# Patient Record
Sex: FEMALE | Race: BLACK OR AFRICAN AMERICAN | Employment: FULL TIME
[De-identification: names, ages, dates, MRNs, and addresses within clinical notes are randomized per-mention and may not be internally consistent; named-entity substitution may affect disease eponyms.]

---

## 2017-01-20 ENCOUNTER — OFFICE VISIT (OUTPATIENT)
Dept: OBGYN | Facility: CLINIC | Age: 22
End: 2017-01-20

## 2017-01-20 VITALS
SYSTOLIC BLOOD PRESSURE: 124 MMHG | HEIGHT: 65 IN | TEMPERATURE: 99.4 F | HEART RATE: 80 BPM | BODY MASS INDEX: 24.29 KG/M2 | DIASTOLIC BLOOD PRESSURE: 83 MMHG | WEIGHT: 145.8 LBS | RESPIRATION RATE: 16 BRPM

## 2017-01-20 DIAGNOSIS — Z30.09 FAMILY PLANNING: ICD-10-CM

## 2017-01-20 DIAGNOSIS — N89.8 VAGINAL DISCHARGE: Primary | ICD-10-CM

## 2017-01-20 PROCEDURE — 99213 OFFICE O/P EST LOW 20 MIN: CPT | Performed by: OBSTETRICS & GYNECOLOGY

## 2017-03-26 ENCOUNTER — HOSPITAL ENCOUNTER (EMERGENCY)
Age: 22
Discharge: HOME | End: 2017-03-26
Payer: COMMERCIAL

## 2017-03-26 VITALS
OXYGEN SATURATION: 99 % | SYSTOLIC BLOOD PRESSURE: 121 MMHG | RESPIRATION RATE: 16 BRPM | WEIGHT: 147 LBS | TEMPERATURE: 98 F | HEART RATE: 89 BPM | HEIGHT: 63 IN | BODY MASS INDEX: 26.05 KG/M2 | DIASTOLIC BLOOD PRESSURE: 70 MMHG

## 2017-03-26 DIAGNOSIS — M54.50 ACUTE RIGHT-SIDED LOW BACK PAIN WITHOUT SCIATICA: Primary | ICD-10-CM

## 2017-03-26 LAB
ABSOLUTE EOS #: 0.1 K/UL (ref 0–0.4)
ABSOLUTE LYMPH #: 1.9 K/UL (ref 1–4.8)
ABSOLUTE MONO #: 0.4 K/UL (ref 0.2–0.8)
ALBUMIN SERPL-MCNC: 4.2 G/DL (ref 3.5–5.2)
ALBUMIN/GLOBULIN RATIO: ABNORMAL (ref 1–2.5)
ALP BLD-CCNC: 42 U/L (ref 35–104)
ALT SERPL-CCNC: 8 U/L (ref 5–33)
AMYLASE: 63 U/L (ref 28–100)
ANION GAP SERPL CALCULATED.3IONS-SCNC: 12 MMOL/L (ref 9–17)
AST SERPL-CCNC: 11 U/L
BASOPHILS # BLD: 1 % (ref 0–2)
BASOPHILS ABSOLUTE: 0 K/UL (ref 0–0.2)
BILIRUB SERPL-MCNC: 0.18 MG/DL (ref 0.3–1.2)
BILIRUBIN DIRECT: <0.08 MG/DL
BILIRUBIN URINE: NEGATIVE
BILIRUBIN, INDIRECT: ABNORMAL MG/DL (ref 0–1)
BUN BLDV-MCNC: 6 MG/DL (ref 6–20)
BUN/CREAT BLD: 11 (ref 9–20)
CALCIUM SERPL-MCNC: 9 MG/DL (ref 8.6–10.4)
CHLORIDE BLD-SCNC: 104 MMOL/L (ref 98–107)
CO2: 23 MMOL/L (ref 20–31)
COLOR: YELLOW
COMMENT UA: NORMAL
CREAT SERPL-MCNC: 0.55 MG/DL (ref 0.5–0.9)
DIFFERENTIAL TYPE: ABNORMAL
EOSINOPHILS RELATIVE PERCENT: 2 % (ref 1–4)
GFR AFRICAN AMERICAN: >60 ML/MIN
GFR NON-AFRICAN AMERICAN: >60 ML/MIN
GFR SERPL CREATININE-BSD FRML MDRD: NORMAL ML/MIN/{1.73_M2}
GFR SERPL CREATININE-BSD FRML MDRD: NORMAL ML/MIN/{1.73_M2}
GLOBULIN: ABNORMAL G/DL (ref 1.5–3.8)
GLUCOSE BLD-MCNC: 92 MG/DL (ref 70–99)
GLUCOSE URINE: NEGATIVE
HCT VFR BLD CALC: 36.2 % (ref 36–46)
HEMOGLOBIN: 12.2 G/DL (ref 12–16)
KETONES, URINE: NEGATIVE
LEUKOCYTE ESTERASE, URINE: NEGATIVE
LIPASE: 23 U/L (ref 13–60)
LYMPHOCYTES # BLD: 40 % (ref 24–44)
MCH RBC QN AUTO: 32.1 PG (ref 26–34)
MCHC RBC AUTO-ENTMCNC: 33.8 G/DL (ref 31–37)
MCV RBC AUTO: 94.9 FL (ref 80–100)
MONOCYTES # BLD: 8 % (ref 1–7)
NITRITE, URINE: NEGATIVE
PDW BLD-RTO: 13.3 % (ref 11.5–14.5)
PH UA: 7.5 (ref 5–8)
PLATELET # BLD: 219 K/UL (ref 130–400)
PLATELET ESTIMATE: ABNORMAL
PMV BLD AUTO: 8.8 FL (ref 6–12)
POTASSIUM SERPL-SCNC: 4 MMOL/L (ref 3.7–5.3)
PROTEIN UA: NEGATIVE
RBC # BLD: 3.81 M/UL (ref 4–5.2)
RBC # BLD: ABNORMAL 10*6/UL
SEG NEUTROPHILS: 49 % (ref 36–66)
SEGMENTED NEUTROPHILS ABSOLUTE COUNT: 2.3 K/UL (ref 1.8–7.7)
SODIUM BLD-SCNC: 139 MMOL/L (ref 135–144)
SPECIFIC GRAVITY UA: 1.02 (ref 1–1.03)
TOTAL PROTEIN: 7.1 G/DL (ref 6.4–8.3)
TURBIDITY: CLEAR
URINE HGB: NEGATIVE
UROBILINOGEN, URINE: NORMAL
WBC # BLD: 4.7 K/UL (ref 3.5–11)
WBC # BLD: ABNORMAL 10*3/UL

## 2017-03-26 PROCEDURE — 85025 COMPLETE CBC W/AUTO DIFF WBC: CPT

## 2017-03-26 PROCEDURE — 84703 CHORIONIC GONADOTROPIN ASSAY: CPT

## 2017-03-26 PROCEDURE — 99283 EMERGENCY DEPT VISIT LOW MDM: CPT

## 2017-03-26 PROCEDURE — 83690 ASSAY OF LIPASE: CPT

## 2017-03-26 PROCEDURE — 80048 BASIC METABOLIC PNL TOTAL CA: CPT

## 2017-03-26 PROCEDURE — 80076 HEPATIC FUNCTION PANEL: CPT

## 2017-03-26 PROCEDURE — 82150 ASSAY OF AMYLASE: CPT

## 2017-03-26 RX ORDER — IBUPROFEN 600 MG/1
600 TABLET ORAL EVERY 6 HOURS PRN
Qty: 30 TABLET | Refills: 0 | Status: SHIPPED | OUTPATIENT
Start: 2017-03-26 | End: 2017-08-30

## 2017-03-26 RX ORDER — METHOCARBAMOL 750 MG/1
750 TABLET, FILM COATED ORAL 3 TIMES DAILY
Qty: 30 TABLET | Refills: 0 | Status: SHIPPED | OUTPATIENT
Start: 2017-03-26 | End: 2017-04-05

## 2017-03-26 ASSESSMENT — PAIN DESCRIPTION - ORIENTATION: ORIENTATION: LOWER

## 2017-03-26 ASSESSMENT — PAIN DESCRIPTION - LOCATION: LOCATION: BACK

## 2017-03-26 ASSESSMENT — ENCOUNTER SYMPTOMS
RESPIRATORY NEGATIVE: 1
BACK PAIN: 1

## 2017-03-26 ASSESSMENT — PAIN SCALES - GENERAL: PAINLEVEL_OUTOF10: 3

## 2017-03-26 ASSESSMENT — PAIN DESCRIPTION - DESCRIPTORS: DESCRIPTORS: ACHING

## 2017-03-27 LAB — HCG, PREGNANCY URINE (POC): NEGATIVE

## 2017-05-22 ENCOUNTER — OFFICE VISIT (OUTPATIENT)
Dept: OBGYN | Age: 22
End: 2017-05-22
Payer: COMMERCIAL

## 2017-05-22 ENCOUNTER — HOSPITAL ENCOUNTER (OUTPATIENT)
Age: 22
Setting detail: SPECIMEN
Discharge: HOME | End: 2017-05-22
Payer: COMMERCIAL

## 2017-05-22 VITALS
DIASTOLIC BLOOD PRESSURE: 87 MMHG | SYSTOLIC BLOOD PRESSURE: 135 MMHG | WEIGHT: 149 LBS | HEART RATE: 95 BPM | HEIGHT: 64 IN | BODY MASS INDEX: 25.44 KG/M2

## 2017-05-22 DIAGNOSIS — Z01.419 WOMEN'S ANNUAL ROUTINE GYNECOLOGICAL EXAMINATION: Primary | ICD-10-CM

## 2017-05-22 DIAGNOSIS — Z86.19 H/O CHLAMYDIA INFECTION: ICD-10-CM

## 2017-05-22 LAB
BILIRUBIN URINE: NEGATIVE
COLOR: YELLOW
COMMENT UA: NORMAL
DIRECT EXAM: NORMAL
GLUCOSE URINE: NEGATIVE
KETONES, URINE: NEGATIVE
LEUKOCYTE ESTERASE, URINE: NEGATIVE
Lab: NORMAL
NITRITE, URINE: NEGATIVE
PH UA: 7.5 (ref 5–8)
PROTEIN UA: NEGATIVE
SPECIFIC GRAVITY UA: 1.01 (ref 1–1.03)
SPECIMEN DESCRIPTION: NORMAL
STATUS: NORMAL
TURBIDITY: CLEAR
URINE HGB: NEGATIVE
UROBILINOGEN, URINE: NORMAL

## 2017-05-22 PROCEDURE — 99395 PREV VISIT EST AGE 18-39: CPT | Performed by: OBSTETRICS & GYNECOLOGY

## 2017-05-22 PROCEDURE — 81003 URINALYSIS AUTO W/O SCOPE: CPT | Performed by: OBSTETRICS & GYNECOLOGY

## 2017-05-22 RX ORDER — NORGESTIMATE AND ETHINYL ESTRADIOL 0.25-0.035
1 KIT ORAL DAILY
Qty: 28 TABLET | Refills: 5 | Status: ON HOLD | OUTPATIENT
Start: 2017-05-22 | End: 2018-04-17 | Stop reason: SDUPTHER

## 2017-05-23 LAB
C TRACH DNA GENITAL QL NAA+PROBE: NEGATIVE
N. GONORRHOEAE DNA: NEGATIVE

## 2017-06-08 RX ORDER — NORGESTIMATE AND ETHINYL ESTRADIOL 0.25-0.035
KIT ORAL
Qty: 28 TABLET | Refills: 3 | Status: SHIPPED | OUTPATIENT
Start: 2017-06-08 | End: 2017-08-30

## 2017-08-27 ENCOUNTER — HOSPITAL ENCOUNTER (EMERGENCY)
Age: 22
Discharge: HOME | End: 2017-08-27
Attending: EMERGENCY MEDICINE
Payer: COMMERCIAL

## 2017-08-27 VITALS
HEIGHT: 64 IN | HEART RATE: 90 BPM | RESPIRATION RATE: 16 BRPM | TEMPERATURE: 98.1 F | SYSTOLIC BLOOD PRESSURE: 134 MMHG | BODY MASS INDEX: 25.9 KG/M2 | WEIGHT: 151.7 LBS | OXYGEN SATURATION: 100 % | DIASTOLIC BLOOD PRESSURE: 74 MMHG

## 2017-08-27 DIAGNOSIS — L02.91 ABSCESS: Primary | ICD-10-CM

## 2017-08-27 PROCEDURE — 10061 I&D ABSCESS COMP/MULTIPLE: CPT

## 2017-08-27 PROCEDURE — 99282 EMERGENCY DEPT VISIT SF MDM: CPT

## 2017-08-27 RX ORDER — CEPHALEXIN 500 MG/1
500 CAPSULE ORAL 3 TIMES DAILY
Qty: 15 CAPSULE | Refills: 0 | Status: SHIPPED | OUTPATIENT
Start: 2017-08-27 | End: 2017-09-01

## 2017-08-27 RX ORDER — FLUCONAZOLE 100 MG/1
100 TABLET ORAL DAILY
Qty: 5 TABLET | Refills: 0 | Status: SHIPPED | OUTPATIENT
Start: 2017-08-27 | End: 2017-09-01

## 2017-08-27 ASSESSMENT — PAIN DESCRIPTION - ORIENTATION: ORIENTATION: LEFT

## 2017-08-27 ASSESSMENT — PAIN SCALES - GENERAL: PAINLEVEL_OUTOF10: 8

## 2017-08-27 ASSESSMENT — PAIN DESCRIPTION - LOCATION: LOCATION: GROIN

## 2017-08-27 ASSESSMENT — PAIN SCALES - WONG BAKER: WONGBAKER_NUMERICALRESPONSE: 8

## 2017-08-30 ENCOUNTER — OFFICE VISIT (OUTPATIENT)
Dept: FAMILY MEDICINE CLINIC | Age: 22
End: 2017-08-30
Payer: COMMERCIAL

## 2017-08-30 VITALS
HEART RATE: 80 BPM | BODY MASS INDEX: 25.78 KG/M2 | SYSTOLIC BLOOD PRESSURE: 123 MMHG | WEIGHT: 151 LBS | HEIGHT: 64 IN | TEMPERATURE: 97.6 F | DIASTOLIC BLOOD PRESSURE: 87 MMHG

## 2017-08-30 DIAGNOSIS — L02.234 CARBUNCLE OF GROIN: Primary | ICD-10-CM

## 2017-08-30 PROCEDURE — 99213 OFFICE O/P EST LOW 20 MIN: CPT | Performed by: FAMILY MEDICINE

## 2017-08-30 ASSESSMENT — ENCOUNTER SYMPTOMS
CONSTIPATION: 0
SORE THROAT: 0
ABDOMINAL PAIN: 0
COUGH: 0
TROUBLE SWALLOWING: 0
NAUSEA: 0
SHORTNESS OF BREATH: 0

## 2018-01-29 ENCOUNTER — HOSPITAL ENCOUNTER (EMERGENCY)
Age: 23
Discharge: HOME | End: 2018-01-29
Attending: EMERGENCY MEDICINE
Payer: COMMERCIAL

## 2018-01-29 VITALS
WEIGHT: 151.3 LBS | DIASTOLIC BLOOD PRESSURE: 76 MMHG | TEMPERATURE: 98.7 F | OXYGEN SATURATION: 100 % | BODY MASS INDEX: 25.83 KG/M2 | HEART RATE: 83 BPM | RESPIRATION RATE: 16 BRPM | SYSTOLIC BLOOD PRESSURE: 138 MMHG | HEIGHT: 64 IN

## 2018-01-29 DIAGNOSIS — R10.9 LEFT FLANK PAIN: Primary | ICD-10-CM

## 2018-01-29 DIAGNOSIS — K59.00 CONSTIPATION, UNSPECIFIED CONSTIPATION TYPE: ICD-10-CM

## 2018-01-29 LAB
-: ABNORMAL
AMORPHOUS: ABNORMAL
APPEARANCE: NORMAL
BACTERIA: ABNORMAL
BILIRUBIN URINE: NEGATIVE
BILIRUBIN, POC: NORMAL
BLOOD URINE, POC: NORMAL
CASTS UA: ABNORMAL /LPF
CHP ED QC CHECK: NORMAL
CHP ED QC CHECK: NORMAL
CLARITY, POC: CLEAR
COLOR, POC: YELLOW
COLOR: YELLOW
COMMENT UA: ABNORMAL
CRYSTALS, UA: ABNORMAL /HPF
EPITHELIAL CELLS UA: ABNORMAL /HPF (ref 0–5)
GLUCOSE URINE, POC: NORMAL
GLUCOSE URINE: NEGATIVE
KETONES, POC: NORMAL
KETONES, URINE: ABNORMAL
LEUKOCYTE EST, POC: NORMAL
LEUKOCYTE ESTERASE, URINE: ABNORMAL
MUCUS: ABNORMAL
NITRITE, POC: NORMAL
NITRITE, URINE: NEGATIVE
OTHER OBSERVATIONS UA: ABNORMAL
PH UA: 5.5 (ref 5–8)
PH, POC: 6
PREGNANCY TEST URINE, POC: NEGATIVE
PROTEIN UA: NEGATIVE
PROTEIN, POC: NORMAL
RBC UA: ABNORMAL /HPF (ref 0–2)
RENAL EPITHELIAL, UA: ABNORMAL /HPF
SPECIFIC GRAVITY UA: 1.03 (ref 1–1.03)
SPECIFIC GRAVITY, POC: 1.03
TRICHOMONAS: ABNORMAL
TURBIDITY: CLEAR
URINE HGB: NEGATIVE
UROBILINOGEN, POC: 1
UROBILINOGEN, URINE: NORMAL
WBC UA: ABNORMAL /HPF (ref 0–5)
YEAST: ABNORMAL

## 2018-01-29 PROCEDURE — 87086 URINE CULTURE/COLONY COUNT: CPT

## 2018-01-29 PROCEDURE — 86403 PARTICLE AGGLUT ANTBDY SCRN: CPT

## 2018-01-29 PROCEDURE — 81001 URINALYSIS AUTO W/SCOPE: CPT

## 2018-01-29 PROCEDURE — 99283 EMERGENCY DEPT VISIT LOW MDM: CPT

## 2018-01-29 PROCEDURE — 84703 CHORIONIC GONADOTROPIN ASSAY: CPT

## 2018-01-29 RX ORDER — POLYETHYLENE GLYCOL 3350 17 G/17G
17 POWDER, FOR SOLUTION ORAL DAILY PRN
Qty: 527 G | Refills: 1 | Status: SHIPPED | OUTPATIENT
Start: 2018-01-29 | End: 2018-02-28

## 2018-01-30 LAB
CULTURE: ABNORMAL
CULTURE: ABNORMAL
HCG, PREGNANCY URINE (POC): NEGATIVE
Lab: ABNORMAL
SPECIMEN DESCRIPTION: ABNORMAL
SPECIMEN DESCRIPTION: ABNORMAL
STATUS: ABNORMAL

## 2018-01-30 NOTE — ED NOTES
Urine dipped, results placed on chart and reported to Dr Hicks .       Christine Quintanilla, RN  01/29/18 2820

## 2018-01-30 NOTE — ED PROVIDER NOTES
[facility] ED  eMERGENCY dEPARTMENT eNCOUnter      Pt Name: Eva Mackay  MRN: [id number]  [place] 1995  Date of evaluation: 1/29/2018  Provider: Thi Hernandez MD    CHIEF COMPLAINT       Chief Complaint   Patient presents with    Dysuria         HISTORY OF PRESENT ILLNESS   (Location/Symptom, Timing/Onset, Context/Setting, Quality, Duration, Modifying Factors, Severity)  Note limiting factors. Eva Mackay is a 20 y.o. female who presents to the emergency department With a 3 month history of intermittent cramp-like left flank pain accompanied occasionally with nausea and sometimes vomiting. She has been constipated off and on and denies urinary symptoms. The mother thought she may have a urinary tract infection and directed her to the ED. Patient does not report vaginal discharge. The history is provided by the patient. Nursing Notes were reviewed. REVIEW OF SYSTEMS    (2-9 systems for level 4, 10 or more for level 5)     Review of Systems   All other systems reviewed and are negative. Except as noted above the remainder of the review of systems was reviewed and negative. PAST MEDICAL HISTORY     Past Medical History:   Diagnosis Date    Vision abnormalities          SURGICAL HISTORY     History reviewed. No pertinent surgical history. CURRENT MEDICATIONS       Discharge Medication List as of 1/29/2018 10:51 PM      CONTINUE these medications which have NOT CHANGED    Details   !! [address] 0.25-35 MG-MCG per tablet take 1 tablet by mouth once daily, Disp-28 tablet, R-5Normal      !! norgestimate-ethinyl estradiol (MONO-LINYAH) 0.25-35 MG-MCG per tablet Take 1 tablet by mouth daily, Disp-28 tablet, R-5Normal       !! - Potential duplicate medications found. Please discuss with provider. ALLERGIES     Review of patient's allergies indicates no known allergies.     FAMILY HISTORY       Family History   Problem Relation Age of Onset    Breast Cancer Neg Hx Non-plain film images such as CT, Ultrasound and MRI are read by the radiologist. Plain radiographic images are visualized and preliminarily interpreted by the emergency physician with the below findings:    Interpretation per the Radiologist below, if available at the time of this note:    No orders to display         ED BEDSIDE ULTRASOUND:   Performed by ED Physician - none    LABS:  Labs Reviewed   UA W/REFLEX CULTURE - Abnormal; Notable for the following:        Result Value    Ketones, Urine 1+ (*)     Leukocyte Esterase, Urine TRACE (*)     All other components within normal limits   MICROSCOPIC URINALYSIS - Abnormal; Notable for the following:     Bacteria, UA FEW (*)     Mucus, UA 1+ (*)     All other components within normal limits   POCT URINALYSIS DIPSTICK - Normal   POCT URINE PREGNANCY - Normal   URINE CULTURE CLEAN CATCH       All other labs were within normal range or not returned as of this dictation. EMERGENCY DEPARTMENT COURSE and DIFFERENTIAL DIAGNOSIS/MDM:   Vitals:    Vitals:    01/29/18 2140   BP: 138/76   Pulse: 83   Resp: 16   Temp: 98.7 °F (37.1 °C)   TempSrc: Oral   SpO2: 100%   Weight: 151 lb 4.8 oz (68.6 kg)   Height: 5' 4\" (1.626 m)       Urinalysis does not show signs of infection. Urine pregnancy is negative. Patient is placed on MiraLAX and is advised follow-up with her primary care physician further workup as needed. MDM    CONSULTS:  None    PROCEDURES:  Unless otherwise noted below, none     Procedures    FINAL IMPRESSION      1. Left flank pain    2.  Constipation, unspecified constipation type          DISPOSITION/PLAN   DISPOSITION Decision To Discharge 01/29/2018 10:50:00 PM      PATIENT REFERRED TO:  Pedro Novak MD  [facility] [address]  [phone]    Schedule an appointment as soon as possible for a visit         DISCHARGE MEDICATIONS:  Discharge Medication List as of 1/29/2018 10:51 PM      START taking these medications    Details   polyethylene

## 2018-02-05 ENCOUNTER — HOSPITAL ENCOUNTER (EMERGENCY)
Age: 23
Discharge: HOME | End: 2018-02-05
Attending: EMERGENCY MEDICINE
Payer: MEDICARE

## 2018-02-05 VITALS
OXYGEN SATURATION: 100 % | HEIGHT: 64 IN | SYSTOLIC BLOOD PRESSURE: 133 MMHG | RESPIRATION RATE: 16 BRPM | HEART RATE: 95 BPM | TEMPERATURE: 98.7 F | BODY MASS INDEX: 25.4 KG/M2 | DIASTOLIC BLOOD PRESSURE: 84 MMHG | WEIGHT: 148.8 LBS

## 2018-02-05 DIAGNOSIS — N76.0 BACTERIAL VAGINOSIS: Primary | ICD-10-CM

## 2018-02-05 DIAGNOSIS — B96.89 BACTERIAL VAGINOSIS: Primary | ICD-10-CM

## 2018-02-05 LAB
-: ABNORMAL
AMORPHOUS: ABNORMAL
BACTERIA: ABNORMAL
BILIRUBIN URINE: NEGATIVE
CASTS UA: ABNORMAL /LPF
CHP ED QC CHECK: YES
COLOR: YELLOW
COMMENT UA: ABNORMAL
CRYSTALS, UA: ABNORMAL /HPF
DIRECT EXAM: ABNORMAL
EPITHELIAL CELLS UA: ABNORMAL /HPF (ref 0–5)
GLUCOSE URINE: NEGATIVE
KETONES, URINE: ABNORMAL
LEUKOCYTE ESTERASE, URINE: NEGATIVE
Lab: ABNORMAL
MUCUS: ABNORMAL
NITRITE, URINE: NEGATIVE
OTHER OBSERVATIONS UA: ABNORMAL
PH UA: 5.5 (ref 5–8)
PREGNANCY TEST URINE, POC: NEGATIVE
PROTEIN UA: NEGATIVE
RBC UA: ABNORMAL /HPF (ref 0–2)
RENAL EPITHELIAL, UA: ABNORMAL /HPF
SPECIFIC GRAVITY UA: 1.02 (ref 1–1.03)
SPECIMEN DESCRIPTION: ABNORMAL
STATUS: ABNORMAL
TRICHOMONAS: ABNORMAL
TURBIDITY: ABNORMAL
URINE HGB: NEGATIVE
UROBILINOGEN, URINE: NORMAL
WBC UA: ABNORMAL /HPF (ref 0–5)
YEAST: ABNORMAL

## 2018-02-05 PROCEDURE — 81001 URINALYSIS AUTO W/SCOPE: CPT

## 2018-02-05 PROCEDURE — 87480 CANDIDA DNA DIR PROBE: CPT

## 2018-02-05 PROCEDURE — 87510 GARDNER VAG DNA DIR PROBE: CPT

## 2018-02-05 PROCEDURE — 87591 N.GONORRHOEAE DNA AMP PROB: CPT

## 2018-02-05 PROCEDURE — 99283 EMERGENCY DEPT VISIT LOW MDM: CPT

## 2018-02-05 PROCEDURE — 84703 CHORIONIC GONADOTROPIN ASSAY: CPT

## 2018-02-05 PROCEDURE — 87660 TRICHOMONAS VAGIN DIR PROBE: CPT

## 2018-02-05 PROCEDURE — 87491 CHLMYD TRACH DNA AMP PROBE: CPT

## 2018-02-05 RX ORDER — METRONIDAZOLE 500 MG/1
500 TABLET ORAL 2 TIMES DAILY
Qty: 14 TABLET | Refills: 0 | Status: SHIPPED | OUTPATIENT
Start: 2018-02-05 | End: 2018-02-12

## 2018-02-05 RX ORDER — FLUCONAZOLE 150 MG/1
150 TABLET ORAL ONCE
Qty: 1 TABLET | Refills: 1 | Status: SHIPPED | OUTPATIENT
Start: 2018-02-05 | End: 2018-02-05

## 2018-02-05 ASSESSMENT — PAIN SCALES - GENERAL: PAINLEVEL_OUTOF10: 2

## 2018-02-05 ASSESSMENT — ENCOUNTER SYMPTOMS
DIARRHEA: 0
NAUSEA: 0
COLOR CHANGE: 0
BACK PAIN: 0
ABDOMINAL PAIN: 0
VOMITING: 0

## 2018-02-05 ASSESSMENT — PAIN DESCRIPTION - LOCATION: LOCATION: VAGINA

## 2018-02-05 ASSESSMENT — PAIN SCALES - WONG BAKER: WONGBAKER_NUMERICALRESPONSE: 2

## 2018-02-05 ASSESSMENT — PAIN DESCRIPTION - DESCRIPTORS: DESCRIPTORS: BURNING;ITCHING

## 2018-02-05 ASSESSMENT — PAIN DESCRIPTION - FREQUENCY: FREQUENCY: INTERMITTENT

## 2018-02-06 LAB
C TRACH DNA GENITAL QL NAA+PROBE: NEGATIVE
HCG, PREGNANCY URINE (POC): NEGATIVE
N. GONORRHOEAE DNA: NEGATIVE

## 2018-02-06 NOTE — ED PROVIDER NOTES
[facility] [address] ED  eMERGENCY dEPARTMENT eNCOUnter      Pt Name: Daniel Vital  MRN: [id number]  [place] 1995  Date of evaluation: 2018  Provider: Taj Mondragon NP    CHIEF COMPLAINT       Chief Complaint   Patient presents with    Vaginal Itching     onset 1 week, new soap and laundry soap    Exposure to STD    Dysuria         HISTORY OF PRESENT ILLNESS  (Location/Symptom, Timing/Onset, Context/Setting, Quality, Duration, Modifying Factors, Severity.)   Daniel Vital is a 20 y.o. female who presents to the emergency department via private auto for vaginal discharge, itching, odor. Onset was one week ago. Denies fever, chills, vaginal lesions, abdominal pain. Reports intermittent, mild dysuria. Rates her pain 2/10 at this time. States she recently changed soap and laundry detergent. Nursing Notes were reviewed. ALLERGIES     Review of patient's allergies indicates no known allergies. CURRENT MEDICATIONS       Previous Medications    MONO-LINYAH 0.25-35 MG-MCG PER TABLET    take 1 tablet by mouth once daily    NORGESTIMATE-ETHINYL ESTRADIOL (MONO-LINYAH) 0.25-35 MG-MCG PER TABLET    Take 1 tablet by mouth daily    POLYETHYLENE GLYCOL (MIRALAX) PACKET    Take 17 g by mouth daily as needed for Constipation       PAST MEDICAL HISTORY         Diagnosis Date    Vision abnormalities        SURGICAL HISTORY     History reviewed. No pertinent surgical history. FAMILY HISTORY           Problem Relation Age of Onset    Breast Cancer Neg Hx     Cancer Neg Hx     Colon Cancer Neg Hx     Diabetes Neg Hx     Eclampsia Neg Hx     Hypertension Neg Hx     Ovarian Cancer Neg Hx      Labor Neg Hx     Spont Abortions Neg Hx     Stroke Neg Hx      Family Status   Relation Status    Mother Alive    Neg Hx         SOCIAL HISTORY      reports that she has never smoked. She has never used smokeless tobacco. She reports that she does not drink alcohol or use drugs.     REVIEW OF SYSTEMS    (2-9 systems for level 4, 10 or more for level 5)     Review of Systems   Constitutional: Negative for chills, diaphoresis, fatigue and fever. Gastrointestinal: Negative for abdominal pain, diarrhea, nausea and vomiting. Genitourinary: Positive for dysuria and vaginal discharge. Negative for flank pain, frequency, genital sores, hematuria, urgency and vaginal bleeding. Musculoskeletal: Negative for arthralgias, back pain, myalgias and neck pain. Skin: Negative for color change, rash and wound. Neurological: Negative for dizziness and headaches. Except as noted above the remainder of the review of systems was reviewed and negative. PHYSICAL EXAM    (up to 7 for level 4, 8 or more for level 5)     ED Triage Vitals [02/05/18 2105]   BP Temp Temp Source Pulse Resp SpO2 Height Weight   133/84 98.7 °F (37.1 °C) Oral 95 16 100 % 5' 4\" (1.626 m) 148 lb 12.8 oz (67.5 kg)     Physical Exam   Constitutional: She is oriented to person, place, and time. She appears well-developed and well-nourished. No distress. Eyes: Conjunctivae are normal.   Cardiovascular: Normal rate, regular rhythm and normal heart sounds. Pulmonary/Chest: Effort normal and breath sounds normal. No respiratory distress. She has no wheezes. She has no rales. Abdominal: Soft. Bowel sounds are normal. She exhibits no distension. There is no tenderness. Genitourinary: There is no rash, tenderness or lesion on the right labia. There is no rash, tenderness or lesion on the left labia. Cervix exhibits no motion tenderness and no discharge. Right adnexum displays no tenderness. Left adnexum displays no tenderness. No erythema, tenderness or bleeding in the vagina. No foreign body in the vagina. No vaginal discharge found. Neurological: She is alert and oriented to person, place, and time. Skin: Skin is warm and dry. No rash noted. She is not diaphoretic. Psychiatric: She has a normal mood and affect.  Her behavior is

## 2018-03-26 ENCOUNTER — APPOINTMENT (OUTPATIENT)
Dept: GENERAL RADIOLOGY | Age: 23
End: 2018-03-26
Payer: MEDICARE

## 2018-03-26 ENCOUNTER — HOSPITAL ENCOUNTER (EMERGENCY)
Age: 23
Discharge: HOME | End: 2018-03-26
Attending: EMERGENCY MEDICINE
Payer: MEDICARE

## 2018-03-26 VITALS
RESPIRATION RATE: 16 BRPM | TEMPERATURE: 98.8 F | OXYGEN SATURATION: 100 % | HEART RATE: 83 BPM | DIASTOLIC BLOOD PRESSURE: 89 MMHG | HEIGHT: 64 IN | WEIGHT: 153.19 LBS | BODY MASS INDEX: 26.15 KG/M2 | SYSTOLIC BLOOD PRESSURE: 150 MMHG

## 2018-03-26 DIAGNOSIS — R07.89 ATYPICAL CHEST PAIN: Primary | ICD-10-CM

## 2018-03-26 LAB
ABSOLUTE EOS #: 0.1 K/UL (ref 0–0.4)
ABSOLUTE IMMATURE GRANULOCYTE: ABNORMAL K/UL (ref 0–0.3)
ABSOLUTE LYMPH #: 2.2 K/UL (ref 1–4.8)
ABSOLUTE MONO #: 0.4 K/UL (ref 0.2–0.8)
ANION GAP SERPL CALCULATED.3IONS-SCNC: 12 MMOL/L (ref 9–17)
BASOPHILS # BLD: 0 % (ref 0–2)
BASOPHILS ABSOLUTE: 0 K/UL (ref 0–0.2)
BUN BLDV-MCNC: 14 MG/DL (ref 6–20)
BUN/CREAT BLD: 24 (ref 9–20)
CALCIUM SERPL-MCNC: 8.8 MG/DL (ref 8.6–10.4)
CHLORIDE BLD-SCNC: 101 MMOL/L (ref 98–107)
CHP ED QC CHECK: NORMAL
CO2: 25 MMOL/L (ref 20–31)
CREAT SERPL-MCNC: 0.58 MG/DL (ref 0.5–0.9)
D-DIMER QUANTITATIVE: 0.19 MG/L FEU
DIFFERENTIAL TYPE: ABNORMAL
EKG ATRIAL RATE: 79 BPM
EKG P AXIS: 54 DEGREES
EKG P-R INTERVAL: 124 MS
EKG Q-T INTERVAL: 378 MS
EKG QRS DURATION: 80 MS
EKG QTC CALCULATION (BAZETT): 433 MS
EKG R AXIS: 58 DEGREES
EKG T AXIS: 27 DEGREES
EKG VENTRICULAR RATE: 79 BPM
EOSINOPHILS RELATIVE PERCENT: 1 % (ref 1–4)
GFR AFRICAN AMERICAN: >60 ML/MIN
GFR NON-AFRICAN AMERICAN: >60 ML/MIN
GFR SERPL CREATININE-BSD FRML MDRD: ABNORMAL ML/MIN/{1.73_M2}
GFR SERPL CREATININE-BSD FRML MDRD: ABNORMAL ML/MIN/{1.73_M2}
GLUCOSE BLD-MCNC: 98 MG/DL (ref 70–99)
HCT VFR BLD CALC: 35.5 % (ref 36–46)
HEMOGLOBIN: 11.5 G/DL (ref 12–16)
IMMATURE GRANULOCYTES: ABNORMAL %
LYMPHOCYTES # BLD: 42 % (ref 24–44)
MCH RBC QN AUTO: 31.2 PG (ref 26–34)
MCHC RBC AUTO-ENTMCNC: 32.5 G/DL (ref 31–37)
MCV RBC AUTO: 96.2 FL (ref 80–100)
MONOCYTES # BLD: 8 % (ref 1–7)
NRBC AUTOMATED: ABNORMAL PER 100 WBC
PDW BLD-RTO: 15.1 % (ref 11.5–14.5)
PLATELET # BLD: 259 K/UL (ref 130–400)
PLATELET ESTIMATE: ABNORMAL
PMV BLD AUTO: 9.4 FL (ref 6–12)
POTASSIUM SERPL-SCNC: 3.6 MMOL/L (ref 3.7–5.3)
PREGNANCY TEST URINE, POC: NORMAL
RBC # BLD: 3.69 M/UL (ref 4–5.2)
RBC # BLD: ABNORMAL 10*6/UL
SEG NEUTROPHILS: 49 % (ref 36–66)
SEGMENTED NEUTROPHILS ABSOLUTE COUNT: 2.5 K/UL (ref 1.8–7.7)
SODIUM BLD-SCNC: 138 MMOL/L (ref 135–144)
WBC # BLD: 5.2 K/UL (ref 3.5–11)
WBC # BLD: ABNORMAL 10*3/UL

## 2018-03-26 PROCEDURE — 81003 URINALYSIS AUTO W/O SCOPE: CPT

## 2018-03-26 PROCEDURE — 80048 BASIC METABOLIC PNL TOTAL CA: CPT

## 2018-03-26 PROCEDURE — 93005 ELECTROCARDIOGRAM TRACING: CPT

## 2018-03-26 PROCEDURE — 99285 EMERGENCY DEPT VISIT HI MDM: CPT

## 2018-03-26 PROCEDURE — 71046 X-RAY EXAM CHEST 2 VIEWS: CPT

## 2018-03-26 PROCEDURE — 85379 FIBRIN DEGRADATION QUANT: CPT

## 2018-03-26 PROCEDURE — 85025 COMPLETE CBC W/AUTO DIFF WBC: CPT

## 2018-03-26 PROCEDURE — 84703 CHORIONIC GONADOTROPIN ASSAY: CPT

## 2018-03-26 ASSESSMENT — ENCOUNTER SYMPTOMS
PHOTOPHOBIA: 0
COLOR CHANGE: 0
VOMITING: 0
COUGH: 0
SHORTNESS OF BREATH: 1
NAUSEA: 0
ABDOMINAL PAIN: 0
SORE THROAT: 0
RHINORRHEA: 0
DIARRHEA: 0
SINUS PRESSURE: 0
EYE PAIN: 0

## 2018-03-26 ASSESSMENT — PAIN DESCRIPTION - DESCRIPTORS: DESCRIPTORS: TIGHTNESS

## 2018-03-26 ASSESSMENT — PAIN DESCRIPTION - LOCATION: LOCATION: CHEST

## 2018-03-26 ASSESSMENT — PAIN SCALES - GENERAL: PAINLEVEL_OUTOF10: 5

## 2018-03-26 ASSESSMENT — PAIN DESCRIPTION - ORIENTATION: ORIENTATION: MID

## 2018-03-27 LAB — HCG, PREGNANCY URINE (POC): NEGATIVE

## 2018-03-27 NOTE — ED NOTES
Urine sample obtained/dipped(results on chart) / negative HCg     Davidtta Heading, LPN  96/70/96 0033

## 2018-03-27 NOTE — ED PROVIDER NOTES
The patient was seen and examined by me in conjunction with the mid-level provider. I agree with his/her assessment and treatment plan. The patient's workup is negative and she was advised of test results.      Juanito Sampson MD  03/26/18 0078
All other components within normal limits   POCT URINE PREGNANCY - Normal   D-DIMER, QUANTITATIVE       All other labs were within normal range or not returned as of this dictation. EMERGENCY DEPARTMENT COURSE and DIFFERENTIAL DIAGNOSIS/MDM:   Vitals:    Vitals:    03/26/18 2019   BP: (!) 150/89   Pulse: 83   Resp: 16   Temp: 98.8 °F (37.1 °C)   TempSrc: Oral   SpO2: 100%   Weight: 153 lb 3 oz (69.5 kg)   Height: 5' 4\" (1.626 m)       CLINICAL DECISION MAKING:  The patient presented alert with a nontoxic appearance and was seen in conjunction with Dr. Lisa Chou. Laboratory studies were unremarkable, including d-dimer. Imaging was unremarkable. Urine dipstick was unremarkable, no signs of infection. Follow up with pcp, return to ED if condition worsens. FINAL IMPRESSION      1.  Atypical chest pain            Problem List  Patient Active Problem List   Diagnosis Code    Family planning Z30.09    Vaginal discharge N89.8         DISPOSITION/PLAN   DISPOSITION Decision To Discharge 03/26/2018 09:33:52 PM      PATIENT REFERRED TO:   Kenton Valencia MD  [facility] [facility] [address] [place] [address]  [phone]    Schedule an appointment as soon as possible for a visit in 2 days      [facility] ED  [address] [facility]  [phone]    If symptoms worsen      DISCHARGE MEDICATIONS:     New Prescriptions    No medications on file           (Please note that portions of this note were completed with a voice recognition program.  Efforts were made to edit the dictations but occasionally words are mis-transcribed.)    Etta Laureano [address]  03/26/18 3151

## 2018-04-16 ENCOUNTER — HOSPITAL ENCOUNTER (INPATIENT)
Age: 23
LOS: 2 days | Discharge: HOME | DRG: 254 | End: 2018-04-18
Attending: EMERGENCY MEDICINE | Admitting: INTERNAL MEDICINE
Payer: MEDICARE

## 2018-04-16 DIAGNOSIS — R42 DIZZINESS: ICD-10-CM

## 2018-04-16 DIAGNOSIS — D64.9 ANEMIA, UNSPECIFIED TYPE: ICD-10-CM

## 2018-04-16 DIAGNOSIS — D50.0 IRON DEFICIENCY ANEMIA DUE TO CHRONIC BLOOD LOSS: ICD-10-CM

## 2018-04-16 DIAGNOSIS — K92.2 GASTROINTESTINAL HEMORRHAGE, UNSPECIFIED GASTROINTESTINAL HEMORRHAGE TYPE: Primary | ICD-10-CM

## 2018-04-16 DIAGNOSIS — R55 NEAR SYNCOPE: ICD-10-CM

## 2018-04-16 LAB
ABSOLUTE EOS #: 0.1 K/UL (ref 0–0.4)
ABSOLUTE IMMATURE GRANULOCYTE: ABNORMAL K/UL (ref 0–0.3)
ABSOLUTE LYMPH #: 2.2 K/UL (ref 1–4.8)
ABSOLUTE MONO #: 0.4 K/UL (ref 0.2–0.8)
ABSOLUTE RETIC #: 0.13 M/UL (ref 0.02–0.1)
ALBUMIN SERPL-MCNC: 4 G/DL (ref 3.5–5.2)
ALBUMIN/GLOBULIN RATIO: ABNORMAL (ref 1–2.5)
ALP BLD-CCNC: 48 U/L (ref 35–104)
ALT SERPL-CCNC: 24 U/L (ref 5–33)
ANION GAP SERPL CALCULATED.3IONS-SCNC: 13 MMOL/L (ref 9–17)
AST SERPL-CCNC: 18 U/L
BASOPHILS # BLD: 2 % (ref 0–2)
BASOPHILS ABSOLUTE: 0.2 K/UL (ref 0–0.2)
BILIRUB SERPL-MCNC: <0.1 MG/DL (ref 0.3–1.2)
BILIRUBIN DIRECT: <0.08 MG/DL
BILIRUBIN, INDIRECT: ABNORMAL MG/DL (ref 0–1)
BUN BLDV-MCNC: 11 MG/DL (ref 6–20)
BUN/CREAT BLD: 19 (ref 9–20)
CALCIUM SERPL-MCNC: 8.8 MG/DL (ref 8.6–10.4)
CHLORIDE BLD-SCNC: 102 MMOL/L (ref 98–107)
CO2: 24 MMOL/L (ref 20–31)
CREAT SERPL-MCNC: 0.58 MG/DL (ref 0.5–0.9)
DATE, STOOL #1: ABNORMAL
DATE, STOOL #2: ABNORMAL
DATE, STOOL #3: ABNORMAL
DIFFERENTIAL TYPE: ABNORMAL
EKG ATRIAL RATE: 82 BPM
EKG P AXIS: 44 DEGREES
EKG P-R INTERVAL: 116 MS
EKG Q-T INTERVAL: 356 MS
EKG QRS DURATION: 74 MS
EKG QTC CALCULATION (BAZETT): 415 MS
EKG R AXIS: 46 DEGREES
EKG T AXIS: 17 DEGREES
EKG VENTRICULAR RATE: 82 BPM
EOSINOPHILS RELATIVE PERCENT: 1 % (ref 1–4)
GFR AFRICAN AMERICAN: >60 ML/MIN
GFR NON-AFRICAN AMERICAN: >60 ML/MIN
GFR SERPL CREATININE-BSD FRML MDRD: NORMAL ML/MIN/{1.73_M2}
GFR SERPL CREATININE-BSD FRML MDRD: NORMAL ML/MIN/{1.73_M2}
GLOBULIN: ABNORMAL G/DL
GLUCOSE BLD-MCNC: 91 MG/DL (ref 70–99)
HCT VFR BLD CALC: 22.9 % (ref 36–46)
HEMOCCULT SP1 STL QL: POSITIVE
HEMOCCULT SP2 STL QL: ABNORMAL
HEMOCCULT SP3 STL QL: ABNORMAL
HEMOGLOBIN: 7.7 G/DL (ref 12–16)
IMMATURE GRANULOCYTES: ABNORMAL %
IMMATURE RETIC FRACT: ABNORMAL %
INR BLD: 1
IRON SATURATION: 5 % (ref 20–55)
IRON: 20 UG/DL (ref 37–145)
LYMPHOCYTES # BLD: 30 % (ref 24–44)
MCH RBC QN AUTO: 32.1 PG (ref 26–34)
MCHC RBC AUTO-ENTMCNC: 33.8 G/DL (ref 31–37)
MCV RBC AUTO: 95 FL (ref 80–100)
MONOCYTES # BLD: 5 % (ref 1–7)
NRBC AUTOMATED: ABNORMAL PER 100 WBC
PARTIAL THROMBOPLASTIN TIME: 23.6 SEC (ref 23–31)
PDW BLD-RTO: 15.3 % (ref 11.5–14.5)
PLATELET # BLD: 314 K/UL (ref 130–400)
PLATELET ESTIMATE: ABNORMAL
PMV BLD AUTO: ABNORMAL FL (ref 6–12)
POTASSIUM SERPL-SCNC: 3.7 MMOL/L (ref 3.7–5.3)
PROTHROMBIN TIME: 10 SEC (ref 9.7–11.6)
RBC # BLD: 2.41 M/UL (ref 4–5.2)
RBC # BLD: ABNORMAL 10*6/UL
RETIC %: 5.5 % (ref 0.5–2)
RETIC HEMOGLOBIN: ABNORMAL PG (ref 28.2–35.7)
SEG NEUTROPHILS: 62 % (ref 36–66)
SEGMENTED NEUTROPHILS ABSOLUTE COUNT: 4.3 K/UL (ref 1.8–7.7)
SODIUM BLD-SCNC: 139 MMOL/L (ref 135–144)
TIME, STOOL #1: 2130
TIME, STOOL #2: ABNORMAL
TIME, STOOL #3: ABNORMAL
TOTAL IRON BINDING CAPACITY: 372 UG/DL (ref 250–450)
TOTAL PROTEIN: 6.9 G/DL (ref 6.4–8.3)
UNSATURATED IRON BINDING CAPACITY: 352 UG/DL (ref 112–347)
WBC # BLD: 7.2 K/UL (ref 3.5–11)
WBC # BLD: ABNORMAL 10*3/UL

## 2018-04-16 PROCEDURE — 85025 COMPLETE CBC W/AUTO DIFF WBC: CPT

## 2018-04-16 PROCEDURE — 93005 ELECTROCARDIOGRAM TRACING: CPT

## 2018-04-16 PROCEDURE — 85730 THROMBOPLASTIN TIME PARTIAL: CPT

## 2018-04-16 PROCEDURE — 85045 AUTOMATED RETICULOCYTE COUNT: CPT

## 2018-04-16 PROCEDURE — 80048 BASIC METABOLIC PNL TOTAL CA: CPT

## 2018-04-16 PROCEDURE — 2580000003 HC RX 258: Performed by: NURSE PRACTITIONER

## 2018-04-16 PROCEDURE — 99285 EMERGENCY DEPT VISIT HI MDM: CPT

## 2018-04-16 PROCEDURE — 2060000000 HC ICU INTERMEDIATE R&B

## 2018-04-16 PROCEDURE — 86900 BLOOD TYPING SEROLOGIC ABO: CPT

## 2018-04-16 PROCEDURE — 36415 COLL VENOUS BLD VENIPUNCTURE: CPT

## 2018-04-16 PROCEDURE — 86850 RBC ANTIBODY SCREEN: CPT

## 2018-04-16 PROCEDURE — 83540 ASSAY OF IRON: CPT

## 2018-04-16 PROCEDURE — C9113 INJ PANTOPRAZOLE SODIUM, VIA: HCPCS | Performed by: NURSE PRACTITIONER

## 2018-04-16 PROCEDURE — 83550 IRON BINDING TEST: CPT

## 2018-04-16 PROCEDURE — 6360000002 HC RX W HCPCS: Performed by: NURSE PRACTITIONER

## 2018-04-16 PROCEDURE — 85660 RBC SICKLE CELL TEST: CPT

## 2018-04-16 PROCEDURE — 86920 COMPATIBILITY TEST SPIN: CPT

## 2018-04-16 PROCEDURE — 2580000003 HC RX 258: Performed by: EMERGENCY MEDICINE

## 2018-04-16 PROCEDURE — 86901 BLOOD TYPING SEROLOGIC RH(D): CPT

## 2018-04-16 PROCEDURE — G0328 FECAL BLOOD SCRN IMMUNOASSAY: HCPCS

## 2018-04-16 PROCEDURE — 80076 HEPATIC FUNCTION PANEL: CPT

## 2018-04-16 PROCEDURE — 6360000002 HC RX W HCPCS: Performed by: EMERGENCY MEDICINE

## 2018-04-16 PROCEDURE — C9113 INJ PANTOPRAZOLE SODIUM, VIA: HCPCS | Performed by: EMERGENCY MEDICINE

## 2018-04-16 PROCEDURE — 85610 PROTHROMBIN TIME: CPT

## 2018-04-16 RX ORDER — ONDANSETRON 2 MG/ML
4 INJECTION INTRAMUSCULAR; INTRAVENOUS EVERY 6 HOURS PRN
Status: DISCONTINUED | OUTPATIENT
Start: 2018-04-16 | End: 2018-04-16 | Stop reason: CLARIF

## 2018-04-16 RX ORDER — ONDANSETRON 4 MG/1
4 TABLET, ORALLY DISINTEGRATING ORAL EVERY 6 HOURS PRN
Status: DISCONTINUED | OUTPATIENT
Start: 2018-04-16 | End: 2018-04-18 | Stop reason: HOSPADM

## 2018-04-16 RX ORDER — 0.9 % SODIUM CHLORIDE 0.9 %
1000 INTRAVENOUS SOLUTION INTRAVENOUS ONCE
Status: COMPLETED | OUTPATIENT
Start: 2018-04-16 | End: 2018-04-16

## 2018-04-16 RX ORDER — ACETAMINOPHEN 325 MG/1
650 TABLET ORAL EVERY 4 HOURS PRN
Status: DISCONTINUED | OUTPATIENT
Start: 2018-04-16 | End: 2018-04-18 | Stop reason: HOSPADM

## 2018-04-16 RX ORDER — SODIUM CHLORIDE 9 MG/ML
INJECTION, SOLUTION INTRAVENOUS CONTINUOUS
Status: DISCONTINUED | OUTPATIENT
Start: 2018-04-16 | End: 2018-04-16

## 2018-04-16 RX ORDER — ONDANSETRON 2 MG/ML
4 INJECTION INTRAMUSCULAR; INTRAVENOUS EVERY 6 HOURS PRN
Status: DISCONTINUED | OUTPATIENT
Start: 2018-04-16 | End: 2018-04-18 | Stop reason: HOSPADM

## 2018-04-16 RX ORDER — MORPHINE SULFATE 2 MG/ML
2 INJECTION, SOLUTION INTRAMUSCULAR; INTRAVENOUS
Status: DISCONTINUED | OUTPATIENT
Start: 2018-04-16 | End: 2018-04-18 | Stop reason: HOSPADM

## 2018-04-16 RX ORDER — SODIUM CHLORIDE 9 MG/ML
INJECTION, SOLUTION INTRAVENOUS CONTINUOUS
Status: DISCONTINUED | OUTPATIENT
Start: 2018-04-16 | End: 2018-04-18 | Stop reason: HOSPADM

## 2018-04-16 RX ORDER — MORPHINE SULFATE 4 MG/ML
4 INJECTION, SOLUTION INTRAMUSCULAR; INTRAVENOUS
Status: DISCONTINUED | OUTPATIENT
Start: 2018-04-16 | End: 2018-04-18 | Stop reason: HOSPADM

## 2018-04-16 RX ORDER — SODIUM CHLORIDE 0.9 % (FLUSH) 0.9 %
10 SYRINGE (ML) INJECTION PRN
Status: DISCONTINUED | OUTPATIENT
Start: 2018-04-16 | End: 2018-04-18 | Stop reason: HOSPADM

## 2018-04-16 RX ORDER — SODIUM CHLORIDE 0.9 % (FLUSH) 0.9 %
10 SYRINGE (ML) INJECTION EVERY 12 HOURS SCHEDULED
Status: DISCONTINUED | OUTPATIENT
Start: 2018-04-16 | End: 2018-04-18 | Stop reason: HOSPADM

## 2018-04-16 RX ADMIN — SODIUM CHLORIDE 8 MG/HR: 9 INJECTION, SOLUTION INTRAVENOUS at 23:15

## 2018-04-16 RX ADMIN — SODIUM CHLORIDE: 9 INJECTION, SOLUTION INTRAVENOUS at 21:52

## 2018-04-16 RX ADMIN — SODIUM CHLORIDE 8 MG/HR: 9 INJECTION, SOLUTION INTRAVENOUS at 22:43

## 2018-04-16 RX ADMIN — SODIUM CHLORIDE: 9 INJECTION, SOLUTION INTRAVENOUS at 23:15

## 2018-04-16 RX ADMIN — SODIUM CHLORIDE 1000 ML: 9 INJECTION, SOLUTION INTRAVENOUS at 20:45

## 2018-04-16 RX ADMIN — SODIUM CHLORIDE 80 MG: 9 INJECTION, SOLUTION INTRAVENOUS at 22:16

## 2018-04-16 ASSESSMENT — PAIN DESCRIPTION - DESCRIPTORS: DESCRIPTORS: PRESSURE

## 2018-04-16 ASSESSMENT — ENCOUNTER SYMPTOMS
BACK PAIN: 0
VOMITING: 0
ABDOMINAL PAIN: 1
COUGH: 0
ANAL BLEEDING: 0
COLOR CHANGE: 0
SINUS PRESSURE: 1
EYE PAIN: 0
BLOOD IN STOOL: 1
SORE THROAT: 0
SHORTNESS OF BREATH: 0
RECTAL PAIN: 0
NAUSEA: 0
PHOTOPHOBIA: 0
DIARRHEA: 0

## 2018-04-16 ASSESSMENT — PAIN DESCRIPTION - LOCATION: LOCATION: ABDOMEN

## 2018-04-16 ASSESSMENT — PAIN DESCRIPTION - FREQUENCY: FREQUENCY: INTERMITTENT

## 2018-04-16 ASSESSMENT — PAIN SCALES - GENERAL
PAINLEVEL_OUTOF10: 2
PAINLEVEL_OUTOF10: 0

## 2018-04-16 ASSESSMENT — PAIN DESCRIPTION - ORIENTATION: ORIENTATION: MID

## 2018-04-16 ASSESSMENT — PAIN DESCRIPTION - PAIN TYPE: TYPE: ACUTE PAIN

## 2018-04-17 ENCOUNTER — ANESTHESIA EVENT (OUTPATIENT)
Dept: OPERATING ROOM | Age: 23
DRG: 254 | End: 2018-04-17
Payer: MEDICARE

## 2018-04-17 PROBLEM — K92.1 GASTROINTESTINAL HEMORRHAGE WITH MELENA: Status: ACTIVE | Noted: 2018-04-16

## 2018-04-17 PROBLEM — D64.9 SYMPTOMATIC ANEMIA: Status: ACTIVE | Noted: 2018-04-17

## 2018-04-17 PROBLEM — R42 DIZZINESS: Status: ACTIVE | Noted: 2018-04-17

## 2018-04-17 LAB
ABO/RH: NORMAL
ANION GAP SERPL CALCULATED.3IONS-SCNC: 11 MMOL/L (ref 9–17)
ANTIBODY SCREEN: NEGATIVE
ARM BAND NUMBER: NORMAL
BLD PROD TYP BPU: NORMAL
BLD PROD TYP BPU: NORMAL
BUN BLDV-MCNC: 8 MG/DL (ref 6–20)
BUN/CREAT BLD: 17 (ref 9–20)
CALCIUM SERPL-MCNC: 7.7 MG/DL (ref 8.6–10.4)
CHLORIDE BLD-SCNC: 103 MMOL/L (ref 98–107)
CO2: 23 MMOL/L (ref 20–31)
CREAT SERPL-MCNC: 0.48 MG/DL (ref 0.5–0.9)
CROSSMATCH RESULT: NORMAL
CROSSMATCH RESULT: NORMAL
DISPENSE STATUS BLOOD BANK: NORMAL
DISPENSE STATUS BLOOD BANK: NORMAL
EXPIRATION DATE: NORMAL
GFR AFRICAN AMERICAN: >60 ML/MIN
GFR NON-AFRICAN AMERICAN: >60 ML/MIN
GFR SERPL CREATININE-BSD FRML MDRD: ABNORMAL ML/MIN/{1.73_M2}
GFR SERPL CREATININE-BSD FRML MDRD: ABNORMAL ML/MIN/{1.73_M2}
GLUCOSE BLD-MCNC: 89 MG/DL (ref 70–99)
HCT VFR BLD CALC: 19.3 % (ref 36–46)
HEMOGLOBIN: 6.3 G/DL (ref 12–16)
HEMOGLOBIN: 8.3 G/DL (ref 12–16)
HEMOGLOBIN: 8.8 G/DL (ref 12–16)
HEMOGLOBIN: 8.9 G/DL (ref 12–16)
INR BLD: 1
MCH RBC QN AUTO: 31.1 PG (ref 26–34)
MCHC RBC AUTO-ENTMCNC: 32.4 G/DL (ref 31–37)
MCV RBC AUTO: 96 FL (ref 80–100)
MRSA, DNA, NASAL: NORMAL
NRBC AUTOMATED: ABNORMAL PER 100 WBC
PDW BLD-RTO: 16 % (ref 11.5–14.5)
PLATELET # BLD: 258 K/UL (ref 130–400)
PMV BLD AUTO: 7.5 FL (ref 6–12)
POTASSIUM SERPL-SCNC: 3.7 MMOL/L (ref 3.7–5.3)
PROTHROMBIN TIME: 10.7 SEC (ref 9.7–11.6)
RBC # BLD: 2.01 M/UL (ref 4–5.2)
SICKLE CELL SCREEN: NEGATIVE
SODIUM BLD-SCNC: 137 MMOL/L (ref 135–144)
SPECIMEN DESCRIPTION: NORMAL
TRANSFUSION STATUS: NORMAL
TRANSFUSION STATUS: NORMAL
UNIT DIVISION: 0
UNIT DIVISION: 0
UNIT NUMBER: NORMAL
UNIT NUMBER: NORMAL
WBC # BLD: 5.9 K/UL (ref 3.5–11)

## 2018-04-17 PROCEDURE — C9113 INJ PANTOPRAZOLE SODIUM, VIA: HCPCS | Performed by: NURSE PRACTITIONER

## 2018-04-17 PROCEDURE — 99254 IP/OBS CNSLTJ NEW/EST MOD 60: CPT | Performed by: INTERNAL MEDICINE

## 2018-04-17 PROCEDURE — 2580000003 HC RX 258: Performed by: NURSE PRACTITIONER

## 2018-04-17 PROCEDURE — 36415 COLL VENOUS BLD VENIPUNCTURE: CPT

## 2018-04-17 PROCEDURE — 36430 TRANSFUSION BLD/BLD COMPNT: CPT

## 2018-04-17 PROCEDURE — 6370000000 HC RX 637 (ALT 250 FOR IP): Performed by: NURSE PRACTITIONER

## 2018-04-17 PROCEDURE — P9016 RBC LEUKOCYTES REDUCED: HCPCS

## 2018-04-17 PROCEDURE — 80048 BASIC METABOLIC PNL TOTAL CA: CPT

## 2018-04-17 PROCEDURE — 2580000003 HC RX 258: Performed by: INTERNAL MEDICINE

## 2018-04-17 PROCEDURE — 86900 BLOOD TYPING SEROLOGIC ABO: CPT

## 2018-04-17 PROCEDURE — 6360000002 HC RX W HCPCS: Performed by: NURSE PRACTITIONER

## 2018-04-17 PROCEDURE — 87641 MR-STAPH DNA AMP PROBE: CPT

## 2018-04-17 PROCEDURE — 85018 HEMOGLOBIN: CPT

## 2018-04-17 PROCEDURE — 99222 1ST HOSP IP/OBS MODERATE 55: CPT | Performed by: INTERNAL MEDICINE

## 2018-04-17 PROCEDURE — 85027 COMPLETE CBC AUTOMATED: CPT

## 2018-04-17 PROCEDURE — 85610 PROTHROMBIN TIME: CPT

## 2018-04-17 PROCEDURE — 2060000000 HC ICU INTERMEDIATE R&B

## 2018-04-17 RX ORDER — 0.9 % SODIUM CHLORIDE 0.9 %
250 INTRAVENOUS SOLUTION INTRAVENOUS ONCE
Status: COMPLETED | OUTPATIENT
Start: 2018-04-17 | End: 2018-04-17

## 2018-04-17 RX ADMIN — SODIUM CHLORIDE: 9 INJECTION, SOLUTION INTRAVENOUS at 13:13

## 2018-04-17 RX ADMIN — SODIUM CHLORIDE 250 ML: 9 INJECTION, SOLUTION INTRAVENOUS at 04:14

## 2018-04-17 RX ADMIN — SODIUM CHLORIDE 8 MG/HR: 9 INJECTION, SOLUTION INTRAVENOUS at 18:56

## 2018-04-17 RX ADMIN — SODIUM CHLORIDE 8 MG/HR: 9 INJECTION, SOLUTION INTRAVENOUS at 09:01

## 2018-04-17 RX ADMIN — SODIUM CHLORIDE: 9 INJECTION, SOLUTION INTRAVENOUS at 06:27

## 2018-04-17 RX ADMIN — ACETAMINOPHEN 650 MG: 325 TABLET, FILM COATED ORAL at 04:14

## 2018-04-17 ASSESSMENT — ENCOUNTER SYMPTOMS
VOMITING: 1
NAUSEA: 1
COUGH: 0
SORE THROAT: 0
SHORTNESS OF BREATH: 0
WHEEZING: 0
BLURRED VISION: 0
EYE DISCHARGE: 0
BACK PAIN: 0
CONSTIPATION: 1

## 2018-04-17 ASSESSMENT — PAIN DESCRIPTION - PAIN TYPE
TYPE: ACUTE PAIN

## 2018-04-17 ASSESSMENT — PAIN DESCRIPTION - ONSET: ONSET: OTHER (COMMENT)

## 2018-04-17 ASSESSMENT — PAIN SCALES - GENERAL
PAINLEVEL_OUTOF10: 3
PAINLEVEL_OUTOF10: 1
PAINLEVEL_OUTOF10: 0
PAINLEVEL_OUTOF10: 2

## 2018-04-17 ASSESSMENT — PAIN DESCRIPTION - LOCATION
LOCATION: HEAD
LOCATION: ABDOMEN
LOCATION: HEAD

## 2018-04-17 ASSESSMENT — PAIN DESCRIPTION - ORIENTATION: ORIENTATION: MID

## 2018-04-18 ENCOUNTER — ANESTHESIA (OUTPATIENT)
Dept: OPERATING ROOM | Age: 23
DRG: 254 | End: 2018-04-18
Payer: MEDICARE

## 2018-04-18 VITALS
HEIGHT: 63 IN | WEIGHT: 156.7 LBS | TEMPERATURE: 98.4 F | OXYGEN SATURATION: 98 % | DIASTOLIC BLOOD PRESSURE: 76 MMHG | BODY MASS INDEX: 27.77 KG/M2 | RESPIRATION RATE: 16 BRPM | HEART RATE: 85 BPM | SYSTOLIC BLOOD PRESSURE: 119 MMHG

## 2018-04-18 VITALS
DIASTOLIC BLOOD PRESSURE: 97 MMHG | RESPIRATION RATE: 17 BRPM | OXYGEN SATURATION: 93 % | SYSTOLIC BLOOD PRESSURE: 138 MMHG

## 2018-04-18 PROBLEM — D50.0 IRON DEFICIENCY ANEMIA DUE TO CHRONIC BLOOD LOSS: Status: ACTIVE | Noted: 2018-04-18

## 2018-04-18 LAB
HEMOGLOBIN: 8.3 G/DL (ref 12–16)
HEMOGLOBIN: 8.5 G/DL (ref 12–16)
HEMOGLOBIN: 9.7 G/DL (ref 12–16)

## 2018-04-18 PROCEDURE — 43235 EGD DIAGNOSTIC BRUSH WASH: CPT | Performed by: INTERNAL MEDICINE

## 2018-04-18 PROCEDURE — 6370000000 HC RX 637 (ALT 250 FOR IP): Performed by: INTERNAL MEDICINE

## 2018-04-18 PROCEDURE — 36415 COLL VENOUS BLD VENIPUNCTURE: CPT

## 2018-04-18 PROCEDURE — 7100000000 HC PACU RECOVERY - FIRST 15 MIN: Performed by: INTERNAL MEDICINE

## 2018-04-18 PROCEDURE — 99232 SBSQ HOSP IP/OBS MODERATE 35: CPT | Performed by: INTERNAL MEDICINE

## 2018-04-18 PROCEDURE — 85018 HEMOGLOBIN: CPT

## 2018-04-18 PROCEDURE — 3609012800 HC EGD DIAGNOSTIC ONLY: Performed by: INTERNAL MEDICINE

## 2018-04-18 PROCEDURE — 0DJ08ZZ INSPECTION OF UPPER INTESTINAL TRACT, VIA NATURAL OR ARTIFICIAL OPENING ENDOSCOPIC: ICD-10-PCS | Performed by: INTERNAL MEDICINE

## 2018-04-18 PROCEDURE — 6360000002 HC RX W HCPCS: Performed by: NURSE ANESTHETIST, CERTIFIED REGISTERED

## 2018-04-18 PROCEDURE — 2580000003 HC RX 258: Performed by: NURSE ANESTHETIST, CERTIFIED REGISTERED

## 2018-04-18 PROCEDURE — 3700000000 HC ANESTHESIA ATTENDED CARE: Performed by: INTERNAL MEDICINE

## 2018-04-18 PROCEDURE — 2500000003 HC RX 250 WO HCPCS: Performed by: NURSE ANESTHETIST, CERTIFIED REGISTERED

## 2018-04-18 PROCEDURE — 7100000001 HC PACU RECOVERY - ADDTL 15 MIN: Performed by: INTERNAL MEDICINE

## 2018-04-18 PROCEDURE — C9113 INJ PANTOPRAZOLE SODIUM, VIA: HCPCS | Performed by: NURSE PRACTITIONER

## 2018-04-18 PROCEDURE — 2580000003 HC RX 258: Performed by: NURSE PRACTITIONER

## 2018-04-18 PROCEDURE — 6360000002 HC RX W HCPCS: Performed by: NURSE PRACTITIONER

## 2018-04-18 RX ORDER — PROPOFOL 10 MG/ML
INJECTION, EMULSION INTRAVENOUS PRN
Status: DISCONTINUED | OUTPATIENT
Start: 2018-04-18 | End: 2018-04-18 | Stop reason: SDUPTHER

## 2018-04-18 RX ORDER — ONDANSETRON 2 MG/ML
4 INJECTION INTRAMUSCULAR; INTRAVENOUS
Status: DISCONTINUED | OUTPATIENT
Start: 2018-04-18 | End: 2018-04-18 | Stop reason: HOSPADM

## 2018-04-18 RX ORDER — MIDAZOLAM HYDROCHLORIDE 1 MG/ML
INJECTION INTRAMUSCULAR; INTRAVENOUS PRN
Status: DISCONTINUED | OUTPATIENT
Start: 2018-04-18 | End: 2018-04-18 | Stop reason: SDUPTHER

## 2018-04-18 RX ORDER — LANOLIN ALCOHOL/MO/W.PET/CERES
325 CREAM (GRAM) TOPICAL
Qty: 90 TABLET | Refills: 3 | Status: SHIPPED | OUTPATIENT
Start: 2018-04-18 | End: 2018-08-13 | Stop reason: ALTCHOICE

## 2018-04-18 RX ORDER — LIDOCAINE HYDROCHLORIDE 20 MG/ML
INJECTION, SOLUTION INFILTRATION; PERINEURAL PRN
Status: DISCONTINUED | OUTPATIENT
Start: 2018-04-18 | End: 2018-04-18 | Stop reason: SDUPTHER

## 2018-04-18 RX ORDER — SODIUM CHLORIDE, SODIUM LACTATE, POTASSIUM CHLORIDE, CALCIUM CHLORIDE 600; 310; 30; 20 MG/100ML; MG/100ML; MG/100ML; MG/100ML
INJECTION, SOLUTION INTRAVENOUS CONTINUOUS PRN
Status: DISCONTINUED | OUTPATIENT
Start: 2018-04-18 | End: 2018-04-18 | Stop reason: SDUPTHER

## 2018-04-18 RX ADMIN — PROPOFOL 50 MG: 10 INJECTION, EMULSION INTRAVENOUS at 08:18

## 2018-04-18 RX ADMIN — SODIUM CHLORIDE 8 MG/HR: 9 INJECTION, SOLUTION INTRAVENOUS at 04:53

## 2018-04-18 RX ADMIN — LIDOCAINE HYDROCHLORIDE 50 MG: 20 INJECTION, SOLUTION INFILTRATION; PERINEURAL at 08:14

## 2018-04-18 RX ADMIN — PROPOFOL 50 MG: 10 INJECTION, EMULSION INTRAVENOUS at 08:14

## 2018-04-18 RX ADMIN — SODIUM CHLORIDE, POTASSIUM CHLORIDE, SODIUM LACTATE AND CALCIUM CHLORIDE: 600; 310; 30; 20 INJECTION, SOLUTION INTRAVENOUS at 08:10

## 2018-04-18 RX ADMIN — MIDAZOLAM HYDROCHLORIDE 2 MG: 1 INJECTION, SOLUTION INTRAMUSCULAR; INTRAVENOUS at 08:12

## 2018-04-18 ASSESSMENT — PULMONARY FUNCTION TESTS
PIF_VALUE: 0
PIF_VALUE: 1
PIF_VALUE: 0

## 2018-04-18 ASSESSMENT — PAIN SCALES - GENERAL
PAINLEVEL_OUTOF10: 0

## 2018-04-18 ASSESSMENT — ENCOUNTER SYMPTOMS: SHORTNESS OF BREATH: 0

## 2018-04-19 ENCOUNTER — CARE COORDINATION (OUTPATIENT)
Dept: CASE MANAGEMENT | Age: 23
End: 2018-04-19

## 2018-04-19 DIAGNOSIS — D50.0 IRON DEFICIENCY ANEMIA DUE TO CHRONIC BLOOD LOSS: Primary | ICD-10-CM

## 2018-04-19 PROCEDURE — 1111F DSCHRG MED/CURRENT MED MERGE: CPT

## 2018-04-26 ENCOUNTER — OFFICE VISIT (OUTPATIENT)
Dept: GASTROENTEROLOGY | Age: 23
End: 2018-04-26
Payer: MEDICARE

## 2018-04-26 ENCOUNTER — CARE COORDINATION (OUTPATIENT)
Dept: CASE MANAGEMENT | Age: 23
End: 2018-04-26

## 2018-04-26 ENCOUNTER — HOSPITAL ENCOUNTER (OUTPATIENT)
Age: 23
Discharge: HOME | End: 2018-04-26
Payer: MEDICARE

## 2018-04-26 VITALS
DIASTOLIC BLOOD PRESSURE: 79 MMHG | BODY MASS INDEX: 28.3 KG/M2 | SYSTOLIC BLOOD PRESSURE: 114 MMHG | HEART RATE: 82 BPM | WEIGHT: 159.7 LBS | HEIGHT: 63 IN | OXYGEN SATURATION: 100 %

## 2018-04-26 DIAGNOSIS — R10.30 LOWER ABDOMINAL PAIN: ICD-10-CM

## 2018-04-26 DIAGNOSIS — D50.0 IRON DEFICIENCY ANEMIA DUE TO CHRONIC BLOOD LOSS: Primary | ICD-10-CM

## 2018-04-26 DIAGNOSIS — D50.0 IRON DEFICIENCY ANEMIA DUE TO CHRONIC BLOOD LOSS: ICD-10-CM

## 2018-04-26 LAB
HCT VFR BLD CALC: 32.6 % (ref 36–46)
HEMOGLOBIN: 10.7 G/DL (ref 12–16)
MCH RBC QN AUTO: 30.4 PG (ref 26–34)
MCHC RBC AUTO-ENTMCNC: 32.7 G/DL (ref 31–37)
MCV RBC AUTO: 92.8 FL (ref 80–100)
NRBC AUTOMATED: ABNORMAL PER 100 WBC
PDW BLD-RTO: 18.9 % (ref 11.5–14.9)
PLATELET # BLD: 311 K/UL (ref 150–450)
PMV BLD AUTO: 9.1 FL (ref 6–12)
RBC # BLD: 3.51 M/UL (ref 4–5.2)
WBC # BLD: 5.5 K/UL (ref 3.5–11)

## 2018-04-26 PROCEDURE — 36415 COLL VENOUS BLD VENIPUNCTURE: CPT

## 2018-04-26 PROCEDURE — 1111F DSCHRG MED/CURRENT MED MERGE: CPT | Performed by: INTERNAL MEDICINE

## 2018-04-26 PROCEDURE — 85027 COMPLETE CBC AUTOMATED: CPT

## 2018-04-26 PROCEDURE — G8427 DOCREV CUR MEDS BY ELIG CLIN: HCPCS | Performed by: INTERNAL MEDICINE

## 2018-04-26 PROCEDURE — G8419 CALC BMI OUT NRM PARAM NOF/U: HCPCS | Performed by: INTERNAL MEDICINE

## 2018-04-26 PROCEDURE — 99213 OFFICE O/P EST LOW 20 MIN: CPT | Performed by: INTERNAL MEDICINE

## 2018-04-26 PROCEDURE — 1036F TOBACCO NON-USER: CPT | Performed by: INTERNAL MEDICINE

## 2018-04-26 RX ORDER — POLYETHYLENE GLYCOL 3350 17 G/17G
POWDER, FOR SOLUTION ORAL
Qty: 255 G | Refills: 0 | Status: ON HOLD | OUTPATIENT
Start: 2018-04-26 | End: 2018-06-01 | Stop reason: ALTCHOICE

## 2018-04-26 ASSESSMENT — ENCOUNTER SYMPTOMS
COUGH: 0
ANAL BLEEDING: 0
DIARRHEA: 1
RECTAL PAIN: 0
BACK PAIN: 0
FACIAL SWELLING: 0
EYES NEGATIVE: 1
VOMITING: 0
SORE THROAT: 0
BLOOD IN STOOL: 0
SINUS PAIN: 0
TROUBLE SWALLOWING: 0
SINUS PRESSURE: 0
VOICE CHANGE: 0
CONSTIPATION: 1
NAUSEA: 1
WHEEZING: 0
RHINORRHEA: 0
ABDOMINAL PAIN: 1
SHORTNESS OF BREATH: 1
ABDOMINAL DISTENTION: 0

## 2018-05-02 ENCOUNTER — CARE COORDINATION (OUTPATIENT)
Dept: CASE MANAGEMENT | Age: 23
End: 2018-05-02

## 2018-05-10 ENCOUNTER — HOSPITAL ENCOUNTER (OUTPATIENT)
Age: 23
Setting detail: SPECIMEN
Discharge: HOME | End: 2018-05-10
Payer: MEDICARE

## 2018-05-10 ENCOUNTER — OFFICE VISIT (OUTPATIENT)
Dept: FAMILY MEDICINE CLINIC | Age: 23
End: 2018-05-10
Payer: MEDICARE

## 2018-05-10 VITALS
WEIGHT: 153.8 LBS | TEMPERATURE: 97 F | DIASTOLIC BLOOD PRESSURE: 80 MMHG | SYSTOLIC BLOOD PRESSURE: 116 MMHG | HEART RATE: 90 BPM | HEIGHT: 63 IN | BODY MASS INDEX: 27.25 KG/M2

## 2018-05-10 DIAGNOSIS — N92.1 MENOMETRORRHAGIA: ICD-10-CM

## 2018-05-10 DIAGNOSIS — N92.1 MENOMETRORRHAGIA: Primary | ICD-10-CM

## 2018-05-10 LAB
ABSOLUTE EOS #: 0.04 K/UL (ref 0–0.44)
ABSOLUTE IMMATURE GRANULOCYTE: <0.03 K/UL (ref 0–0.3)
ABSOLUTE LYMPH #: 1.56 K/UL (ref 1.1–3.7)
ABSOLUTE MONO #: 0.33 K/UL (ref 0.1–1.2)
BASOPHILS # BLD: 1 % (ref 0–2)
BASOPHILS ABSOLUTE: 0.04 K/UL (ref 0–0.2)
CONTROL: PRESENT
DIFFERENTIAL TYPE: ABNORMAL
EOSINOPHILS RELATIVE PERCENT: 1 % (ref 1–4)
HCT VFR BLD CALC: 37.4 % (ref 36.3–47.1)
HEMOGLOBIN: 11.7 G/DL (ref 11.9–15.1)
IMMATURE GRANULOCYTES: 0 %
LYMPHOCYTES # BLD: 33 % (ref 24–43)
MCH RBC QN AUTO: 30.2 PG (ref 25.2–33.5)
MCHC RBC AUTO-ENTMCNC: 31.3 G/DL (ref 28.4–34.8)
MCV RBC AUTO: 96.6 FL (ref 82.6–102.9)
MONOCYTES # BLD: 7 % (ref 3–12)
NRBC AUTOMATED: 0 PER 100 WBC
PDW BLD-RTO: 15.6 % (ref 11.8–14.4)
PLATELET # BLD: 325 K/UL (ref 138–453)
PLATELET ESTIMATE: ABNORMAL
PMV BLD AUTO: 11.7 FL (ref 8.1–13.5)
PREGNANCY TEST URINE, POC: NEGATIVE
RBC # BLD: 3.87 M/UL (ref 3.95–5.11)
RBC # BLD: ABNORMAL 10*6/UL
SEG NEUTROPHILS: 58 % (ref 36–65)
SEGMENTED NEUTROPHILS ABSOLUTE COUNT: 2.79 K/UL (ref 1.5–8.1)
WBC # BLD: 4.8 K/UL (ref 3.5–11.3)
WBC # BLD: ABNORMAL 10*3/UL

## 2018-05-10 PROCEDURE — 99213 OFFICE O/P EST LOW 20 MIN: CPT | Performed by: STUDENT IN AN ORGANIZED HEALTH CARE EDUCATION/TRAINING PROGRAM

## 2018-05-10 PROCEDURE — 1036F TOBACCO NON-USER: CPT | Performed by: STUDENT IN AN ORGANIZED HEALTH CARE EDUCATION/TRAINING PROGRAM

## 2018-05-10 PROCEDURE — 1111F DSCHRG MED/CURRENT MED MERGE: CPT | Performed by: STUDENT IN AN ORGANIZED HEALTH CARE EDUCATION/TRAINING PROGRAM

## 2018-05-10 PROCEDURE — 81025 URINE PREGNANCY TEST: CPT | Performed by: STUDENT IN AN ORGANIZED HEALTH CARE EDUCATION/TRAINING PROGRAM

## 2018-05-10 PROCEDURE — G8427 DOCREV CUR MEDS BY ELIG CLIN: HCPCS | Performed by: STUDENT IN AN ORGANIZED HEALTH CARE EDUCATION/TRAINING PROGRAM

## 2018-05-10 PROCEDURE — G8419 CALC BMI OUT NRM PARAM NOF/U: HCPCS | Performed by: STUDENT IN AN ORGANIZED HEALTH CARE EDUCATION/TRAINING PROGRAM

## 2018-05-10 RX ORDER — MEDROXYPROGESTERONE ACETATE 5 MG/1
5 TABLET ORAL DAILY
Qty: 10 TABLET | Refills: 0 | Status: SHIPPED | OUTPATIENT
Start: 2018-05-10 | End: 2018-08-13 | Stop reason: ALTCHOICE

## 2018-05-10 ASSESSMENT — PATIENT HEALTH QUESTIONNAIRE - PHQ9
SUM OF ALL RESPONSES TO PHQ QUESTIONS 1-9: 0
SUM OF ALL RESPONSES TO PHQ9 QUESTIONS 1 & 2: 0
2. FEELING DOWN, DEPRESSED OR HOPELESS: 0
1. LITTLE INTEREST OR PLEASURE IN DOING THINGS: 0

## 2018-05-10 ASSESSMENT — ENCOUNTER SYMPTOMS
ABDOMINAL PAIN: 1
VOMITING: 0
NAUSEA: 0
DIARRHEA: 0
CONSTIPATION: 1
COUGH: 0
SHORTNESS OF BREATH: 0

## 2018-05-31 ENCOUNTER — ANESTHESIA EVENT (OUTPATIENT)
Dept: OPERATING ROOM | Age: 23
End: 2018-05-31
Payer: MEDICARE

## 2018-06-01 ENCOUNTER — HOSPITAL ENCOUNTER (OUTPATIENT)
Age: 23
Setting detail: OUTPATIENT SURGERY
Discharge: HOME | End: 2018-06-01
Attending: INTERNAL MEDICINE | Admitting: INTERNAL MEDICINE
Payer: MEDICARE

## 2018-06-01 ENCOUNTER — ANESTHESIA (OUTPATIENT)
Dept: OPERATING ROOM | Age: 23
End: 2018-06-01
Payer: MEDICARE

## 2018-06-01 VITALS
RESPIRATION RATE: 16 BRPM | TEMPERATURE: 97.5 F | BODY MASS INDEX: 26.2 KG/M2 | SYSTOLIC BLOOD PRESSURE: 121 MMHG | HEART RATE: 72 BPM | HEIGHT: 64 IN | DIASTOLIC BLOOD PRESSURE: 74 MMHG | WEIGHT: 153.44 LBS | OXYGEN SATURATION: 100 %

## 2018-06-01 VITALS
OXYGEN SATURATION: 100 % | SYSTOLIC BLOOD PRESSURE: 102 MMHG | DIASTOLIC BLOOD PRESSURE: 56 MMHG | RESPIRATION RATE: 22 BRPM

## 2018-06-01 LAB — HCG, PREGNANCY URINE (POC): NEGATIVE

## 2018-06-01 PROCEDURE — 7100000011 HC PHASE II RECOVERY - ADDTL 15 MIN: Performed by: INTERNAL MEDICINE

## 2018-06-01 PROCEDURE — 3700000000 HC ANESTHESIA ATTENDED CARE: Performed by: INTERNAL MEDICINE

## 2018-06-01 PROCEDURE — 3700000001 HC ADD 15 MINUTES (ANESTHESIA): Performed by: INTERNAL MEDICINE

## 2018-06-01 PROCEDURE — 84703 CHORIONIC GONADOTROPIN ASSAY: CPT

## 2018-06-01 PROCEDURE — 7100000010 HC PHASE II RECOVERY - FIRST 15 MIN: Performed by: INTERNAL MEDICINE

## 2018-06-01 PROCEDURE — 2580000003 HC RX 258: Performed by: ANESTHESIOLOGY

## 2018-06-01 PROCEDURE — 3609027000 HC COLONOSCOPY: Performed by: INTERNAL MEDICINE

## 2018-06-01 PROCEDURE — 2500000003 HC RX 250 WO HCPCS: Performed by: NURSE ANESTHETIST, CERTIFIED REGISTERED

## 2018-06-01 PROCEDURE — 6360000002 HC RX W HCPCS: Performed by: NURSE ANESTHETIST, CERTIFIED REGISTERED

## 2018-06-01 RX ORDER — LIDOCAINE HYDROCHLORIDE 20 MG/ML
INJECTION, SOLUTION INFILTRATION; PERINEURAL PRN
Status: DISCONTINUED | OUTPATIENT
Start: 2018-06-01 | End: 2018-06-01 | Stop reason: SDUPTHER

## 2018-06-01 RX ORDER — PROPOFOL 10 MG/ML
INJECTION, EMULSION INTRAVENOUS PRN
Status: DISCONTINUED | OUTPATIENT
Start: 2018-06-01 | End: 2018-06-01 | Stop reason: SDUPTHER

## 2018-06-01 RX ORDER — LIDOCAINE HYDROCHLORIDE 10 MG/ML
1 INJECTION, SOLUTION EPIDURAL; INFILTRATION; INTRACAUDAL; PERINEURAL
Status: DISCONTINUED | OUTPATIENT
Start: 2018-06-02 | End: 2018-06-01 | Stop reason: HOSPADM

## 2018-06-01 RX ORDER — SODIUM CHLORIDE, SODIUM LACTATE, POTASSIUM CHLORIDE, CALCIUM CHLORIDE 600; 310; 30; 20 MG/100ML; MG/100ML; MG/100ML; MG/100ML
INJECTION, SOLUTION INTRAVENOUS CONTINUOUS
Status: DISCONTINUED | OUTPATIENT
Start: 2018-06-02 | End: 2018-06-01 | Stop reason: HOSPADM

## 2018-06-01 RX ADMIN — PROPOFOL 30 MG: 10 INJECTION, EMULSION INTRAVENOUS at 08:29

## 2018-06-01 RX ADMIN — SODIUM CHLORIDE, POTASSIUM CHLORIDE, SODIUM LACTATE AND CALCIUM CHLORIDE: 600; 310; 30; 20 INJECTION, SOLUTION INTRAVENOUS at 08:24

## 2018-06-01 RX ADMIN — PROPOFOL 20 MG: 10 INJECTION, EMULSION INTRAVENOUS at 08:38

## 2018-06-01 RX ADMIN — PROPOFOL 10 MG: 10 INJECTION, EMULSION INTRAVENOUS at 08:36

## 2018-06-01 RX ADMIN — PROPOFOL 30 MG: 10 INJECTION, EMULSION INTRAVENOUS at 08:39

## 2018-06-01 RX ADMIN — PROPOFOL 10 MG: 10 INJECTION, EMULSION INTRAVENOUS at 08:35

## 2018-06-01 RX ADMIN — PROPOFOL 10 MG: 10 INJECTION, EMULSION INTRAVENOUS at 08:46

## 2018-06-01 RX ADMIN — PROPOFOL 10 MG: 10 INJECTION, EMULSION INTRAVENOUS at 08:33

## 2018-06-01 RX ADMIN — PROPOFOL 20 MG: 10 INJECTION, EMULSION INTRAVENOUS at 08:31

## 2018-06-01 RX ADMIN — SODIUM CHLORIDE, POTASSIUM CHLORIDE, SODIUM LACTATE AND CALCIUM CHLORIDE: 600; 310; 30; 20 INJECTION, SOLUTION INTRAVENOUS at 07:39

## 2018-06-01 RX ADMIN — PROPOFOL 10 MG: 10 INJECTION, EMULSION INTRAVENOUS at 08:48

## 2018-06-01 RX ADMIN — PROPOFOL 10 MG: 10 INJECTION, EMULSION INTRAVENOUS at 08:44

## 2018-06-01 RX ADMIN — PROPOFOL 10 MG: 10 INJECTION, EMULSION INTRAVENOUS at 08:42

## 2018-06-01 RX ADMIN — PROPOFOL 50 MG: 10 INJECTION, EMULSION INTRAVENOUS at 08:28

## 2018-06-01 RX ADMIN — LIDOCAINE HYDROCHLORIDE 100 MG: 20 INJECTION, SOLUTION INFILTRATION; PERINEURAL at 08:28

## 2018-06-01 RX ADMIN — PROPOFOL 40 MG: 10 INJECTION, EMULSION INTRAVENOUS at 08:40

## 2018-06-01 ASSESSMENT — PULMONARY FUNCTION TESTS
PIF_VALUE: 0

## 2018-06-01 ASSESSMENT — PAIN - FUNCTIONAL ASSESSMENT: PAIN_FUNCTIONAL_ASSESSMENT: 0-10

## 2018-08-13 ENCOUNTER — HOSPITAL ENCOUNTER (EMERGENCY)
Age: 23
Discharge: HOME | End: 2018-08-13
Payer: MEDICARE

## 2018-08-13 VITALS
RESPIRATION RATE: 18 BRPM | HEART RATE: 86 BPM | WEIGHT: 158.7 LBS | BODY MASS INDEX: 27.1 KG/M2 | TEMPERATURE: 99.2 F | HEIGHT: 64 IN | DIASTOLIC BLOOD PRESSURE: 87 MMHG | SYSTOLIC BLOOD PRESSURE: 138 MMHG | OXYGEN SATURATION: 100 %

## 2018-08-13 DIAGNOSIS — R10.13 ABDOMINAL PAIN, EPIGASTRIC: Primary | ICD-10-CM

## 2018-08-13 LAB
ABSOLUTE EOS #: 0.1 K/UL (ref 0–0.4)
ABSOLUTE IMMATURE GRANULOCYTE: ABNORMAL K/UL (ref 0–0.3)
ABSOLUTE LYMPH #: 2.1 K/UL (ref 1–4.8)
ABSOLUTE MONO #: 0.5 K/UL (ref 0.2–0.8)
ALBUMIN SERPL-MCNC: 4.3 G/DL (ref 3.5–5.2)
ALBUMIN/GLOBULIN RATIO: ABNORMAL (ref 1–2.5)
ALP BLD-CCNC: 53 U/L (ref 35–104)
ALT SERPL-CCNC: 8 U/L (ref 5–33)
AMYLASE: 65 U/L (ref 28–100)
ANION GAP SERPL CALCULATED.3IONS-SCNC: 11 MMOL/L (ref 9–17)
AST SERPL-CCNC: 14 U/L
BASOPHILS # BLD: 0 % (ref 0–2)
BASOPHILS ABSOLUTE: 0 K/UL (ref 0–0.2)
BILIRUB SERPL-MCNC: 0.25 MG/DL (ref 0.3–1.2)
BILIRUBIN DIRECT: <0.08 MG/DL
BILIRUBIN, INDIRECT: ABNORMAL MG/DL (ref 0–1)
BUN BLDV-MCNC: 13 MG/DL (ref 6–20)
BUN/CREAT BLD: 22 (ref 9–20)
CALCIUM SERPL-MCNC: 9.1 MG/DL (ref 8.6–10.4)
CHLORIDE BLD-SCNC: 104 MMOL/L (ref 98–107)
CO2: 21 MMOL/L (ref 20–31)
CREAT SERPL-MCNC: 0.58 MG/DL (ref 0.5–0.9)
DIFFERENTIAL TYPE: ABNORMAL
EOSINOPHILS RELATIVE PERCENT: 2 % (ref 1–4)
GFR AFRICAN AMERICAN: >60 ML/MIN
GFR NON-AFRICAN AMERICAN: >60 ML/MIN
GFR SERPL CREATININE-BSD FRML MDRD: ABNORMAL ML/MIN/{1.73_M2}
GFR SERPL CREATININE-BSD FRML MDRD: ABNORMAL ML/MIN/{1.73_M2}
GLOBULIN: ABNORMAL G/DL (ref 1.5–3.8)
GLUCOSE BLD-MCNC: 81 MG/DL (ref 70–99)
HCT VFR BLD CALC: 33 % (ref 36–46)
HEMOGLOBIN: 10.6 G/DL (ref 12–16)
IMMATURE GRANULOCYTES: ABNORMAL %
LIPASE: 21 U/L (ref 13–60)
LYMPHOCYTES # BLD: 38 % (ref 24–44)
MCH RBC QN AUTO: 29.5 PG (ref 26–34)
MCHC RBC AUTO-ENTMCNC: 32.2 G/DL (ref 31–37)
MCV RBC AUTO: 91.8 FL (ref 80–100)
MONOCYTES # BLD: 9 % (ref 1–7)
NRBC AUTOMATED: ABNORMAL PER 100 WBC
PDW BLD-RTO: 16.4 % (ref 11.5–14.5)
PLATELET # BLD: 225 K/UL (ref 130–400)
PLATELET ESTIMATE: ABNORMAL
PMV BLD AUTO: 9.2 FL (ref 6–12)
POTASSIUM SERPL-SCNC: 3.6 MMOL/L (ref 3.7–5.3)
RBC # BLD: 3.59 M/UL (ref 4–5.2)
RBC # BLD: ABNORMAL 10*6/UL
SEG NEUTROPHILS: 51 % (ref 36–66)
SEGMENTED NEUTROPHILS ABSOLUTE COUNT: 2.9 K/UL (ref 1.8–7.7)
SODIUM BLD-SCNC: 136 MMOL/L (ref 135–144)
TOTAL PROTEIN: 7.4 G/DL (ref 6.4–8.3)
WBC # BLD: 5.6 K/UL (ref 3.5–11)
WBC # BLD: ABNORMAL 10*3/UL

## 2018-08-13 PROCEDURE — 84703 CHORIONIC GONADOTROPIN ASSAY: CPT

## 2018-08-13 PROCEDURE — 80076 HEPATIC FUNCTION PANEL: CPT

## 2018-08-13 PROCEDURE — 82150 ASSAY OF AMYLASE: CPT

## 2018-08-13 PROCEDURE — 83690 ASSAY OF LIPASE: CPT

## 2018-08-13 PROCEDURE — 85025 COMPLETE CBC W/AUTO DIFF WBC: CPT

## 2018-08-13 PROCEDURE — 36415 COLL VENOUS BLD VENIPUNCTURE: CPT

## 2018-08-13 PROCEDURE — 99284 EMERGENCY DEPT VISIT MOD MDM: CPT

## 2018-08-13 PROCEDURE — 81003 URINALYSIS AUTO W/O SCOPE: CPT

## 2018-08-13 PROCEDURE — 6370000000 HC RX 637 (ALT 250 FOR IP): Performed by: NURSE PRACTITIONER

## 2018-08-13 PROCEDURE — 80048 BASIC METABOLIC PNL TOTAL CA: CPT

## 2018-08-13 RX ORDER — PANTOPRAZOLE SODIUM 40 MG/1
40 TABLET, DELAYED RELEASE ORAL ONCE
Status: COMPLETED | OUTPATIENT
Start: 2018-08-13 | End: 2018-08-13

## 2018-08-13 RX ORDER — PANTOPRAZOLE SODIUM 20 MG/1
40 TABLET, DELAYED RELEASE ORAL DAILY
Qty: 30 TABLET | Refills: 0 | Status: SHIPPED | OUTPATIENT
Start: 2018-08-13 | End: 2019-01-17

## 2018-08-13 RX ADMIN — LIDOCAINE HYDROCHLORIDE: 20 SOLUTION ORAL; TOPICAL at 20:18

## 2018-08-13 RX ADMIN — PANTOPRAZOLE SODIUM 40 MG: 40 TABLET, DELAYED RELEASE ORAL at 21:47

## 2018-08-13 ASSESSMENT — PAIN SCALES - GENERAL: PAINLEVEL_OUTOF10: 4

## 2018-08-14 LAB — HCG, PREGNANCY URINE (POC): NEGATIVE

## 2018-08-14 ASSESSMENT — ENCOUNTER SYMPTOMS
SORE THROAT: 0
WHEEZING: 0
COLOR CHANGE: 0
VOMITING: 0
NAUSEA: 0
CONSTIPATION: 0
SHORTNESS OF BREATH: 0
COUGH: 0
ABDOMINAL PAIN: 1
SINUS PRESSURE: 0
RHINORRHEA: 0
DIARRHEA: 0

## 2018-08-14 NOTE — ED NOTES
Pt presents with c/o upper abdominal pain. Pt states pain started approximately 3 days ago. Last BM was today. Denies diarrhea and vomiting. Pt states she has been nauseous. Denies decreased appetite. Rates pain 4/10.      Mirela Franco RN  08/13/18 2015

## 2018-08-14 NOTE — ED PROVIDER NOTES
[address] [place] [address] ED  eMERGENCY dEPARTMENT eNCOUnter      Pt Name: Yash Borges  MRN: [id number]  [place] 1995  Date of evaluation: 2018  Provider: Diya Chen NP, [facility] - [facility] [address]       Chief Complaint   Patient presents with    Abdominal Pain         HISTORY OF PRESENT ILLNESS  (Location/Symptom, Timing/Onset, Context/Setting, Quality, Duration, Modifying Factors, Severity.)   Yash Borges is a 20 y.o. female who presents to the emergency department Today by private vehicle for evaluation of abdominal pain. Patient states that for the last 3-4 days she's been having an epigastric abdominal pain. She rates the pain a 4 on a 0-10 scale. She states that she's had this same pain a few months ago and she had a scope and colonoscopy all which were unremarkable. She states that the pain actually improves when she eats. Nursing Notes were reviewed. ALLERGIES     Patient has no known allergies. CURRENT MEDICATIONS       Discharge Medication List as of 2018  9:29 PM          PAST MEDICAL HISTORY         Diagnosis Date    Symptomatic anemia 2018    Vision abnormalities        SURGICAL HISTORY           Procedure Laterality Date    COLONOSCOPY  2018    No lesions seen    [place] COLON CA SCRN NOT HI RSK IND N/A 2018    COLONOSCOPY performed by Isabelle Borja MD at [address] [place] [address]  2018    No lesions seen up to the 2nd part of the duodenum.     UPPER GASTROINTESTINAL ENDOSCOPY N/A 2018    EGD DIAGNOSTIC ONLY performed by Isabelle Borja MD at [place] [address]       Family History   Problem Relation Age of Onset    Other Maternal Grandmother         diverticulitis    Breast Cancer Neg Hx     Cancer Neg Hx     Colon Cancer Neg Hx     Diabetes Neg Hx     Eclampsia Neg Hx     Hypertension Neg Hx     Ovarian Cancer Neg Hx      Labor Neg Hx     Spont Abortions Neg Hx     Stroke Neg Hx Lymphadenopathy:     She has no cervical adenopathy. Neurological: She is alert and oriented to person, place, and time. Skin: Skin is warm and dry. No rash noted. Psychiatric: She has a normal mood and affect. Vitals reviewed. LABS:  Labs Reviewed   CBC WITH AUTO DIFFERENTIAL - Abnormal; Notable for the following:        Result Value    RBC 3.59 (*)     Hemoglobin 10.6 (*)     Hematocrit 33.0 (*)     RDW 16.4 (*)     Monocytes 9 (*)     All other components within normal limits   BASIC METABOLIC PANEL - Abnormal; Notable for the following:     Bun/Cre Ratio 22 (*)     Potassium 3.6 (*)     All other components within normal limits   HEPATIC FUNCTION PANEL - Abnormal; Notable for the following: Total Bilirubin 0.25 (*)     All other components within normal limits   LIPASE   AMYLASE       All other labs were within normal range or not returned as of this dictation. EMERGENCY DEPARTMENT COURSE and DIFFERENTIAL DIAGNOSIS/MDM:   Vitals:    Vitals:    08/13/18 1950   BP: 138/87   Pulse: 86   Resp: 18   Temp: 99.2 °F (37.3 °C)   TempSrc: Oral   SpO2: 100%   Weight: 158 lb 11.2 oz (72 kg)   Height: 5' 4\" (1.626 m)       Medical Decision Making:   Medications   aluminum & magnesium hydroxide-simethicone (MAALOX) 30 mL, lidocaine viscous (XYLOCAINE) 5 mL (GI COCKTAIL) ( Oral Given 8/13/18 2018)   pantoprazole (PROTONIX) tablet 40 mg (40 mg Oral Given 8/13/18 2147)     FINAL IMPRESSION      1.  Abdominal pain, epigastric          DISPOSITION/PLAN   DISPOSITION Decision To Discharge 08/13/2018 09:28:46 PM      PATIENT REFERRED TO:   [facility] ED  [address] [facility]  [phone]    If symptoms worsen    same day PCP  [phone]          DISCHARGE MEDICATIONS:     Discharge Medication List as of 8/13/2018  9:29 PM      START taking these medications    Details   pantoprazole (PROTONIX) 20 MG tablet Take 2 tablets by mouth daily, Disp-30 tablet, R-0Print                 (Please

## 2019-01-02 ENCOUNTER — HOSPITAL ENCOUNTER (EMERGENCY)
Age: 24
Discharge: HOME | End: 2019-01-02
Attending: EMERGENCY MEDICINE

## 2019-01-02 VITALS
OXYGEN SATURATION: 100 % | BODY MASS INDEX: 24.8 KG/M2 | DIASTOLIC BLOOD PRESSURE: 95 MMHG | WEIGHT: 140 LBS | SYSTOLIC BLOOD PRESSURE: 143 MMHG | HEIGHT: 63 IN | TEMPERATURE: 99.3 F | HEART RATE: 103 BPM
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
## 2019-01-02 DIAGNOSIS — S01.01XA LACERATION OF SCALP WITHOUT FOREIGN BODY, INITIAL ENCOUNTER: ICD-10-CM

## 2019-01-02 LAB — HCG(URINE) PREGNANCY TEST: NEGATIVE

## 2019-01-02 PROCEDURE — 99284 EMERGENCY DEPT VISIT MOD MDM: CPT

## 2019-01-02 PROCEDURE — 12001 RPR S/N/AX/GEN/TRNK 2.5CM/<: CPT

## 2019-01-02 PROCEDURE — 84703 CHORIONIC GONADOTROPIN ASSAY: CPT

## 2019-01-02 RX ORDER — BUTALBITAL, ACETAMINOPHEN AND CAFFEINE 50; 325; 40 MG/1; MG/1; MG/1
1 TABLET ORAL ONCE
Status: DISCONTINUED | OUTPATIENT
Start: 2019-01-02 | End: 2019-01-02

## 2019-01-02 ASSESSMENT — ENCOUNTER SYMPTOMS
PHOTOPHOBIA: 0
NAUSEA: 0
ABDOMINAL PAIN: 0
BACK PAIN: 0
SHORTNESS OF BREATH: 0
VOMITING: 0

## 2019-01-17 ENCOUNTER — APPOINTMENT (OUTPATIENT)
Dept: GENERAL RADIOLOGY | Age: 24
End: 2019-01-17

## 2019-01-17 ENCOUNTER — HOSPITAL ENCOUNTER (EMERGENCY)
Age: 24
Discharge: HOME | End: 2019-01-17
Attending: EMERGENCY MEDICINE

## 2019-01-17 VITALS
HEART RATE: 122 BPM | TEMPERATURE: 103 F | RESPIRATION RATE: 15 BRPM | DIASTOLIC BLOOD PRESSURE: 74 MMHG | SYSTOLIC BLOOD PRESSURE: 141 MMHG | OXYGEN SATURATION: 100 %

## 2019-01-17 DIAGNOSIS — J10.1 INFLUENZA A: Primary | ICD-10-CM

## 2019-01-17 LAB
CHP ED QC CHECK: NORMAL
DIRECT EXAM: ABNORMAL
DIRECT EXAM: ABNORMAL
Lab: ABNORMAL
PREGNANCY TEST URINE, POC: NEGATIVE
SPECIMEN DESCRIPTION: ABNORMAL
STATUS: ABNORMAL

## 2019-01-17 PROCEDURE — 71046 X-RAY EXAM CHEST 2 VIEWS: CPT

## 2019-01-17 PROCEDURE — 99283 EMERGENCY DEPT VISIT LOW MDM: CPT

## 2019-01-17 PROCEDURE — 6370000000 HC RX 637 (ALT 250 FOR IP): Performed by: NURSE PRACTITIONER

## 2019-01-17 PROCEDURE — 87804 INFLUENZA ASSAY W/OPTIC: CPT

## 2019-01-17 RX ORDER — IBUPROFEN 600 MG/1
600 TABLET ORAL ONCE
Status: COMPLETED | OUTPATIENT
Start: 2019-01-17 | End: 2019-01-17

## 2019-01-17 RX ADMIN — IBUPROFEN 600 MG: 600 TABLET ORAL at 10:07

## 2019-01-17 ASSESSMENT — PAIN SCALES - GENERAL
PAINLEVEL_OUTOF10: 0
PAINLEVEL_OUTOF10: 9

## 2019-01-17 ASSESSMENT — ENCOUNTER SYMPTOMS
ABDOMINAL PAIN: 0
SORE THROAT: 1
COUGH: 1
VOMITING: 0
NAUSEA: 0
STRIDOR: 0
WHEEZING: 0

## 2019-05-24 ENCOUNTER — HOSPITAL ENCOUNTER (EMERGENCY)
Age: 24
Discharge: HOME | End: 2019-05-25
Attending: EMERGENCY MEDICINE

## 2019-05-24 VITALS
BODY MASS INDEX: 28.7 KG/M2 | OXYGEN SATURATION: 99 % | HEART RATE: 93 BPM | TEMPERATURE: 98.1 F | SYSTOLIC BLOOD PRESSURE: 142 MMHG | HEIGHT: 64 IN | DIASTOLIC BLOOD PRESSURE: 96 MMHG | WEIGHT: 168.1 LBS | RESPIRATION RATE: 14 BRPM

## 2019-05-24 DIAGNOSIS — N76.0 ACUTE VAGINITIS: Primary | ICD-10-CM

## 2019-05-24 LAB
BILIRUBIN, POC: NORMAL
BLOOD URINE, POC: NORMAL
CHP ED QC CHECK: NORMAL
CHP ED QC CHECK: NORMAL
CLARITY, POC: CLEAR
COLOR, POC: NORMAL
GLUCOSE URINE, POC: NORMAL
KETONES, POC: NORMAL
LEUKOCYTE EST, POC: NORMAL
NITRITE, POC: NORMAL
PH, POC: 5.5
PREGNANCY TEST URINE, POC: NORMAL
PROTEIN, POC: NORMAL
SPECIFIC GRAVITY, POC: 1.03
UROBILINOGEN, POC: NORMAL

## 2019-05-24 PROCEDURE — 84703 CHORIONIC GONADOTROPIN ASSAY: CPT

## 2019-05-24 PROCEDURE — 87491 CHLMYD TRACH DNA AMP PROBE: CPT

## 2019-05-24 PROCEDURE — 99283 EMERGENCY DEPT VISIT LOW MDM: CPT

## 2019-05-24 PROCEDURE — 87591 N.GONORRHOEAE DNA AMP PROB: CPT

## 2019-05-24 RX ORDER — METRONIDAZOLE 500 MG/1
500 TABLET ORAL 2 TIMES DAILY WITH MEALS
Qty: 14 TABLET | Refills: 0 | Status: SHIPPED | OUTPATIENT
Start: 2019-05-24 | End: 2019-09-06

## 2019-05-24 RX ORDER — FLUCONAZOLE 150 MG/1
TABLET ORAL
Qty: 3 TABLET | Refills: 0 | Status: SHIPPED | OUTPATIENT
Start: 2019-05-24 | End: 2019-09-06

## 2019-05-24 ASSESSMENT — PAIN DESCRIPTION - DESCRIPTORS: DESCRIPTORS: DISCOMFORT;ACHING

## 2019-05-24 ASSESSMENT — PAIN DESCRIPTION - LOCATION: LOCATION: BACK

## 2019-05-24 ASSESSMENT — PAIN DESCRIPTION - PAIN TYPE: TYPE: ACUTE PAIN

## 2019-05-24 ASSESSMENT — PAIN SCALES - GENERAL: PAINLEVEL_OUTOF10: 7

## 2019-05-24 ASSESSMENT — PAIN DESCRIPTION - FREQUENCY: FREQUENCY: INTERMITTENT

## 2019-05-25 LAB
BILIRUBIN URINE: NEGATIVE
COLOR: YELLOW
COMMENT UA: ABNORMAL
GLUCOSE URINE: NEGATIVE
KETONES, URINE: ABNORMAL
LEUKOCYTE ESTERASE, URINE: NEGATIVE
NITRITE, URINE: NEGATIVE
PH UA: 5.5 (ref 5–8)
PROTEIN UA: NEGATIVE
SPECIFIC GRAVITY UA: 1.03 (ref 1–1.03)
TURBIDITY: CLEAR
URINE HGB: NEGATIVE
UROBILINOGEN, URINE: NORMAL

## 2019-05-25 NOTE — ED PROVIDER NOTES
[facility] ED  eMERGENCY dEPARTMENT eNCOUnter      Pt Name: Jalen Bolden  MRN: [id number]  [place] 1995  Date of evaluation: 5/24/2019  Provider: Jonh Henao MD    CHIEF COMPLAINT     Chief Complaint   Patient presents with    Dysuria     Onset 1 week ago         HISTORY OF PRESENT ILLNESS   (Location/Symptom, Timing/Onset, Context/Setting,Quality, Duration, Modifying Factors, Severity)  Note limiting factors. Jalen Bolden is a 24 y.o. female who presents to the emergency department with a one-week history of vaginal discharge with fishy odor. She does not think she could have a sexually transmitted infection. There is mild discomfort in the left lower back. Patient has had bacterial vaginosis and yeast infections in the past and states that she feels the same way today. The history is provided by the patient. Nursing Notes werereviewed. REVIEW OF SYSTEMS    (2-9 systems for level 4, 10 or more for level 5)     Review of Systems   All other systems reviewed and are negative. Except as noted above the remainder of the review of systems was reviewed and negative. PAST MEDICAL HISTORY     Past Medical History:   Diagnosis Date    Symptomatic anemia 4/17/2018    Vision abnormalities          SURGICALHISTORY       Past Surgical History:   Procedure Laterality Date    COLONOSCOPY  06/01/2018    No lesions seen    [place] COLON CA SCRN NOT HI RSK IND N/A 6/1/2018    COLONOSCOPY performed by Alphonse Pleitez MD at [address] [facility]  04/18/2018    No lesions seen up to the 2nd part of the duodenum.  UPPER GASTROINTESTINAL ENDOSCOPY N/A 4/18/2018    EGD DIAGNOSTIC ONLY performed by Alphonse Pleitez MD at [address] [facility]       Discharge Medication List as of 5/24/2019 11:58 PM          ALLERGIES     Patient has no known allergies.     FAMILY HISTORY       Family History   Problem Relation Age of Onset    Other Maternal Grandmother         diverticulitis    Breast Cancer Neg Hx     Cancer Neg Hx     Colon Cancer Neg Hx     Diabetes Neg Hx     Eclampsia Neg Hx     Hypertension Neg Hx     Ovarian Cancer Neg Hx      Labor Neg Hx     Spont Abortions Neg Hx     Stroke Neg Hx           SOCIAL HISTORY       Social History     Socioeconomic History    Marital status: Single     Spouse name: None    Number of children: None    Years of education: None    Highest education level: None   Occupational History    None   Social Needs    Financial resource strain: None    Food insecurity:     Worry: None     Inability: None    Transportation needs:     Medical: None     Non-medical: None   Tobacco Use    Smoking status: Never Smoker    Smokeless tobacco: Never Used   Substance and Sexual Activity    Alcohol use: No    Drug use: No    Sexual activity: Yes     Partners: Male     Birth control/protection: Injection   Lifestyle    Physical activity:     Days per week: None     Minutes per session: None    Stress: None   Relationships    Social connections:     Talks on phone: None     Gets together: None     Attends Christian service: None     Active member of club or organization: None     Attends meetings of clubs or organizations: None     Relationship status: None    Intimate partner violence:     Fear of current or ex partner: None     Emotionally abused: None     Physically abused: None     Forced sexual activity: None   Other Topics Concern    None   Social History Narrative    None       SCREENINGS             PHYSICAL EXAM    (up to 7 for level 4, 8 or more for level 5)     ED Triage Vitals   BP Temp Temp src Pulse Resp SpO2 Height Weight   19 -- 19   (!) 142/96 98.1 °F (36.7 °C)  93 14 99 % 5' 4\" (1.626 m) 168 lb 1.6 oz (76.2 kg)       Physical Exam   Constitutional: She appears well-developed and well-nourished. No distress. Abdominal: Soft. She exhibits no mass. There is no tenderness. Musculoskeletal: Normal range of motion. Skin: Skin is warm and dry. No rash noted. No pallor. Nursing note and vitals reviewed. DIAGNOSTIC RESULTS     EKG: All EKG's are interpreted by the Emergency Department Physician who either signs orCo-signs this chart in the absence of a cardiologist.    RADIOLOGY:   Non-plain film images such as CT, Ultrasound and MRI are read by the radiologist. Plain radiographic images are visualized and preliminarily interpreted by the emergency physician with the below findings:    Interpretation per the Radiologist below, ifavailable at the time of this note:    No orders to display         ED BEDSIDE ULTRASOUND:   Performed by ED Physician - none    LABS:  Labs Reviewed   URINE RT REFLEX TO CULTURE - Abnormal; Notable for the following components:       Result Value    Ketones, Urine 1+ (*)     All other components within normal limits   POCT URINALYSIS DIPSTICK - Normal   POCT URINE PREGNANCY - Normal   C.TRACHOMATIS N.GONORRHOEAE DNA, URINE   POCT URINE PREGNANCY       All other labs were within normal range ornot returned as of this dictation. EMERGENCY DEPARTMENT COURSE and DIFFERENTIAL DIAGNOSIS/MDM:   Vitals:    Vitals:    05/24/19 2245 05/24/19 2247 05/24/19 2247 05/24/19 2248   BP:   (!) 142/96    Pulse:  93 93 93   Resp:   14    Temp:   98.1 °F (36.7 °C)    SpO2:   99%    Weight: 168 lb 1.6 oz (76.2 kg)      Height: 5' 4\" (1.626 m)          Urinalysis does not show signs of infection. Based on her clinical symptoms she is placed on metronidazole and fluconazole. MDM    CONSULTS:  None    PROCEDURES:  Unlessotherwise noted below, none     Procedures    FINAL IMPRESSION      1.  Acute vaginitis          DISPOSITION/PLAN   DISPOSITION Decision To Discharge 05/24/2019 11:57:44 PM      PATIENT REFERRED TO:  Janet Young MD  [address] [place] [address], [address] [address]  [phone]            DISCHARGE MEDICATIONS:         Problem List:  Patient Active Problem List   Diagnosis Code    Family planning Z30.09    Vaginal discharge N89.8    Gastrointestinal hemorrhage with melena K92.1    Symptomatic anemia D64.9    Dizziness R42    Iron deficiency anemia due to chronic blood loss D50.0    Lower abdominal pain R10.30    Menometrorrhagia N92.1           [place]      Patient Course:  Discharged. ED Medicationsadministered this visit:  Medications - No data to display    New Prescriptions from this visit:    Discharge Medication List as of 5/24/2019 11:58 PM      START taking these medications    Details   metroNIDAZOLE (FLAGYL) 500 MG tablet Take 1 tablet by mouth 2 times daily (with meals), Disp-14 tablet, R-0Print      fluconazole (DIFLUCAN) 150 MG tablet Take one tablet every 5 days. , Disp-3 tablet, R-0Print             Follow-up:  Hayden Dickey MD  [address] [place] [address] Dr, [facility] [address] [phone]              Final Impression:   1.  Acute vaginitis               (Please note that portions of this note were completed with a voice recognitionprogram.  Efforts were made to edit the dictations but occasionally words are mis-transcribed.)    Dante Merlin, MD (electronically signed)  Attending Emergency Physician            Dante Merlin, MD  05/29/19 6048

## 2019-05-25 NOTE — ED NOTES
Pt presents to the er c/o burning upon urination pt states that she also gets frequent vaginal infections and has the same symptoms as now when she does     Noble Henderson RN  05/24/19 1480

## 2019-05-28 LAB
C. TRACHOMATIS DNA ,URINE: NEGATIVE
HCG, PREGNANCY URINE (POC): NEGATIVE
N. GONORRHOEAE DNA, URINE: NEGATIVE
SPECIMEN DESCRIPTION: NORMAL

## 2019-09-05 ENCOUNTER — HOSPITAL ENCOUNTER (EMERGENCY)
Age: 24
Discharge: HOME | End: 2019-09-06
Attending: EMERGENCY MEDICINE

## 2019-09-05 VITALS
RESPIRATION RATE: 18 BRPM | HEART RATE: 74 BPM | TEMPERATURE: 98.1 F | BODY MASS INDEX: 29.01 KG/M2 | WEIGHT: 169.9 LBS | OXYGEN SATURATION: 100 % | HEIGHT: 64 IN | SYSTOLIC BLOOD PRESSURE: 130 MMHG | DIASTOLIC BLOOD PRESSURE: 71 MMHG

## 2019-09-05 DIAGNOSIS — B96.89 BV (BACTERIAL VAGINOSIS): Primary | ICD-10-CM

## 2019-09-05 DIAGNOSIS — N76.0 BV (BACTERIAL VAGINOSIS): Primary | ICD-10-CM

## 2019-09-05 PROCEDURE — 6370000000 HC RX 637 (ALT 250 FOR IP): Performed by: NURSE PRACTITIONER

## 2019-09-05 PROCEDURE — 81025 URINE PREGNANCY TEST: CPT

## 2019-09-05 PROCEDURE — 87480 CANDIDA DNA DIR PROBE: CPT

## 2019-09-05 PROCEDURE — 87660 TRICHOMONAS VAGIN DIR PROBE: CPT

## 2019-09-05 PROCEDURE — 87591 N.GONORRHOEAE DNA AMP PROB: CPT

## 2019-09-05 PROCEDURE — 87491 CHLMYD TRACH DNA AMP PROBE: CPT

## 2019-09-05 PROCEDURE — 6360000002 HC RX W HCPCS: Performed by: NURSE PRACTITIONER

## 2019-09-05 PROCEDURE — 96372 THER/PROPH/DIAG INJ SC/IM: CPT

## 2019-09-05 PROCEDURE — 81003 URINALYSIS AUTO W/O SCOPE: CPT

## 2019-09-05 PROCEDURE — 87510 GARDNER VAG DNA DIR PROBE: CPT

## 2019-09-05 PROCEDURE — 99283 EMERGENCY DEPT VISIT LOW MDM: CPT

## 2019-09-05 RX ORDER — AZITHROMYCIN 250 MG/1
1000 TABLET, FILM COATED ORAL ONCE
Status: COMPLETED | OUTPATIENT
Start: 2019-09-05 | End: 2019-09-05

## 2019-09-05 RX ORDER — CEFTRIAXONE SODIUM 250 MG/1
250 INJECTION, POWDER, FOR SOLUTION INTRAMUSCULAR; INTRAVENOUS ONCE
Status: COMPLETED | OUTPATIENT
Start: 2019-09-05 | End: 2019-09-05

## 2019-09-05 RX ADMIN — AZITHROMYCIN 1000 MG: 250 TABLET, FILM COATED ORAL at 23:18

## 2019-09-05 RX ADMIN — CEFTRIAXONE SODIUM 250 MG: 250 INJECTION, POWDER, FOR SOLUTION INTRAMUSCULAR; INTRAVENOUS at 23:18

## 2019-09-06 LAB
DIRECT EXAM: ABNORMAL
HCG, PREGNANCY URINE (POC): NEGATIVE
Lab: ABNORMAL
SPECIMEN DESCRIPTION: ABNORMAL

## 2019-09-06 RX ORDER — METRONIDAZOLE 500 MG/1
500 TABLET ORAL 2 TIMES DAILY
Qty: 14 TABLET | Refills: 0 | Status: SHIPPED | OUTPATIENT
Start: 2019-09-06 | End: 2019-09-13

## 2019-09-06 RX ORDER — FLUCONAZOLE 150 MG/1
150 TABLET ORAL ONCE
Qty: 1 TABLET | Refills: 0 | Status: SHIPPED | OUTPATIENT
Start: 2019-09-06 | End: 2019-09-06

## 2019-09-06 ASSESSMENT — ENCOUNTER SYMPTOMS
WHEEZING: 0
SORE THROAT: 0
COUGH: 0
VOMITING: 0
COLOR CHANGE: 0
SINUS PRESSURE: 0
DIARRHEA: 0
CONSTIPATION: 0
NAUSEA: 0
RHINORRHEA: 0
ABDOMINAL PAIN: 0
SHORTNESS OF BREATH: 0

## 2019-09-08 LAB
C TRACH DNA GENITAL QL NAA+PROBE: NEGATIVE
N. GONORRHOEAE DNA: NEGATIVE
SPECIMEN DESCRIPTION: NORMAL

## 2020-01-26 ENCOUNTER — APPOINTMENT (OUTPATIENT)
Dept: ULTRASOUND IMAGING | Age: 25
End: 2020-01-26
Payer: COMMERCIAL

## 2020-01-26 ENCOUNTER — HOSPITAL ENCOUNTER (EMERGENCY)
Age: 25
Discharge: HOME | End: 2020-01-26
Attending: EMERGENCY MEDICINE
Payer: COMMERCIAL

## 2020-01-26 VITALS
TEMPERATURE: 98.1 F | WEIGHT: 174 LBS | RESPIRATION RATE: 20 BRPM | DIASTOLIC BLOOD PRESSURE: 65 MMHG | BODY MASS INDEX: 29.71 KG/M2 | SYSTOLIC BLOOD PRESSURE: 123 MMHG | HEIGHT: 64 IN | OXYGEN SATURATION: 100 % | HEART RATE: 81 BPM

## 2020-01-26 LAB
-: ABNORMAL
ABSOLUTE EOS #: 0.06 K/UL (ref 0–0.44)
ABSOLUTE IMMATURE GRANULOCYTE: 0.03 K/UL (ref 0–0.3)
ABSOLUTE LYMPH #: 1.68 K/UL (ref 1.1–3.7)
ABSOLUTE MONO #: 0.64 K/UL (ref 0.1–1.2)
AMORPHOUS: ABNORMAL
BACTERIA: ABNORMAL
BASOPHILS # BLD: 1 % (ref 0–2)
BASOPHILS ABSOLUTE: 0.04 K/UL (ref 0–0.2)
BILIRUBIN URINE: NEGATIVE
CASTS UA: ABNORMAL /LPF
CHP ED QC CHECK: NORMAL
COLOR: YELLOW
COMMENT UA: ABNORMAL
CRYSTALS, UA: ABNORMAL /HPF
DIFFERENTIAL TYPE: ABNORMAL
EOSINOPHILS RELATIVE PERCENT: 1 % (ref 1–4)
EPITHELIAL CELLS UA: ABNORMAL /HPF (ref 0–5)
GLUCOSE URINE: NEGATIVE
HCG QUANTITATIVE: ABNORMAL IU/L
HCT VFR BLD CALC: 32.4 % (ref 36.3–47.1)
HEMOGLOBIN: 10.3 G/DL (ref 11.9–15.1)
IMMATURE GRANULOCYTES: 0 %
KETONES, URINE: ABNORMAL
LEUKOCYTE ESTERASE, URINE: ABNORMAL
LYMPHOCYTES # BLD: 24 % (ref 24–43)
MCH RBC QN AUTO: 29.2 PG (ref 25.2–33.5)
MCHC RBC AUTO-ENTMCNC: 31.8 G/DL (ref 28.4–34.8)
MCV RBC AUTO: 91.8 FL (ref 82.6–102.9)
MONOCYTES # BLD: 9 % (ref 3–12)
MUCUS: ABNORMAL
NITRITE, URINE: NEGATIVE
NRBC AUTOMATED: 0 PER 100 WBC
OTHER OBSERVATIONS UA: ABNORMAL
PDW BLD-RTO: 14.6 % (ref 11.8–14.4)
PH UA: 6 (ref 5–8)
PLATELET # BLD: 278 K/UL (ref 138–453)
PLATELET ESTIMATE: ABNORMAL
PMV BLD AUTO: 10.5 FL (ref 8.1–13.5)
PREGNANCY TEST URINE, POC: NORMAL
PROTEIN UA: NEGATIVE
RBC # BLD: 3.53 M/UL (ref 3.95–5.11)
RBC # BLD: ABNORMAL 10*6/UL
RBC UA: ABNORMAL /HPF (ref 0–2)
RENAL EPITHELIAL, UA: ABNORMAL /HPF
SEG NEUTROPHILS: 65 % (ref 36–65)
SEGMENTED NEUTROPHILS ABSOLUTE COUNT: 4.47 K/UL (ref 1.5–8.1)
SPECIFIC GRAVITY UA: 1.02 (ref 1–1.03)
TRICHOMONAS: ABNORMAL
TURBIDITY: ABNORMAL
URINE HGB: ABNORMAL
UROBILINOGEN, URINE: NORMAL
WBC # BLD: 6.9 K/UL (ref 3.5–11.3)
WBC # BLD: ABNORMAL 10*3/UL
WBC UA: ABNORMAL /HPF (ref 0–5)
YEAST: ABNORMAL

## 2020-01-26 PROCEDURE — 84702 CHORIONIC GONADOTROPIN TEST: CPT

## 2020-01-26 PROCEDURE — 81025 URINE PREGNANCY TEST: CPT

## 2020-01-26 PROCEDURE — 87086 URINE CULTURE/COLONY COUNT: CPT

## 2020-01-26 PROCEDURE — 76817 TRANSVAGINAL US OBSTETRIC: CPT

## 2020-01-26 PROCEDURE — 85025 COMPLETE CBC W/AUTO DIFF WBC: CPT

## 2020-01-26 PROCEDURE — 99284 EMERGENCY DEPT VISIT MOD MDM: CPT

## 2020-01-26 PROCEDURE — 81001 URINALYSIS AUTO W/SCOPE: CPT

## 2020-01-26 PROCEDURE — 76801 OB US < 14 WKS SINGLE FETUS: CPT

## 2020-01-26 ASSESSMENT — ENCOUNTER SYMPTOMS
COUGH: 0
COLOR CHANGE: 0
NAUSEA: 0
VOMITING: 0
SINUS PRESSURE: 0
DIARRHEA: 0
CONSTIPATION: 0
SORE THROAT: 0
WHEEZING: 0
SHORTNESS OF BREATH: 0
ABDOMINAL PAIN: 0
RHINORRHEA: 0

## 2020-01-26 ASSESSMENT — PAIN SCALES - GENERAL
PAINLEVEL_OUTOF10: 5
PAINLEVEL_OUTOF10: 0

## 2020-01-26 ASSESSMENT — PAIN DESCRIPTION - DESCRIPTORS: DESCRIPTORS: CRAMPING

## 2020-01-26 ASSESSMENT — PAIN DESCRIPTION - LOCATION: LOCATION: PELVIS

## 2020-01-26 ASSESSMENT — PAIN DESCRIPTION - FREQUENCY: FREQUENCY: INTERMITTENT

## 2020-01-27 LAB
CULTURE: NORMAL
Lab: NORMAL
SPECIMEN DESCRIPTION: NORMAL

## 2020-01-27 NOTE — ED PROVIDER NOTES
[address] [place] [address] ED  eMERGENCY dEPARTMENT eNCOUnter      Pt Name: Sapna Medrano  MRN: [id number]  [place] 1995  Date of evaluation: 2020  Provider: Get Dumont NP, NIEVES - Patric 9830       Chief Complaint   Patient presents with    Bleeding During Pregnancy     onset yesterday, cramping, took 4 home pregnancy test( 4 positive)         HISTORY OF PRESENT ILLNESS  (Location/Symptom, Timing/Onset, Context/Setting, Quality, Duration, Modifying Factors, Severity.)   Sapna Medrano is a 21 y.o. female who presents to the emergency department today by private vehicle for evaluation of vaginal bleeding. Patient states that her last period was . She took a pregnancy test at home that was positive. A couple of days ago she started having some light spotting and cramping like she was going to start her period. Nursing Notes were reviewed. ALLERGIES     Motrin [ibuprofen]    CURRENT MEDICATIONS     There are no discharge medications for this patient. PAST MEDICAL HISTORY         Diagnosis Date    Symptomatic anemia 2018    Vision abnormalities        SURGICAL HISTORY           Procedure Laterality Date    COLONOSCOPY  2018    No lesions seen    IN COLON CA SCRN NOT HI RSK IND N/A 2018    COLONOSCOPY performed by Rachel Wilson MD at [address] [place] [address]  2018    No lesions seen up to the 2nd part of the duodenum.     UPPER GASTROINTESTINAL ENDOSCOPY N/A 2018    EGD DIAGNOSTIC ONLY performed by Rachel Wilson MD at [place] [address]           Problem Relation Age of Onset    Other Maternal Grandmother         diverticulitis    Breast Cancer Neg Hx     Cancer Neg Hx     Colon Cancer Neg Hx     Diabetes Neg Hx     Eclampsia Neg Hx     Hypertension Neg Hx     Ovarian Cancer Neg Hx      Labor Neg Hx     Spont Abortions Neg Hx     Stroke Neg Hx      Family Status   Relation Name Abdomen is soft. Genitourinary:     Cervix: No cervical bleeding. Comments: cervix is closed  Musculoskeletal: Normal range of motion. Lymphadenopathy:      Cervical: No cervical adenopathy. Skin:     General: Skin is warm and dry. Findings: No rash. Neurological:      Mental Status: She is alert and oriented to person, place, and time. RADIOLOGY:   Non-plain film images such as CT, Ultrasound and MRI are read by the radiologist. Plain radiographic images are visualized and preliminarily interpreted by the emergency physician with the below findings:    Us Ob Less Than 14 Weeks Single Or First Gestation    Result Date: 1/26/2020  EXAMINATION: TRANSABDOMINAL FIRST TRIMESTER OBSTETRIC PELVIC ULTRASOUND WITH COLOR DOPPLER FLOW 1/26/2020 TECHNIQUE: TRANSABDOMINAL PELVIC ULTRASOUND WITH COLOR DOPPLER FLOW COMPARISON: None HISTORY: ORDERING SYSTEM PROVIDED HISTORY: abdominal pian and preg TECHNOLOGIST PROVIDED HISTORY: abdominal pian and preg FINDINGS: Uterus measures 9.3 x 6 x 5.2 cm. There is a yolk sac with a fetal pole measuring 2.7 mm corresponding to 5 weeks 6 days with an [facility] of September 21, 2020. Fetal heart rate 102. Right ovary measures 5.6 x 3.9 x 4 cm. There is a solid right 3.2 x 2.8 x 1.8 cm ovarian nodule. Left ovary measures 2.9 x 1.9 x 1.8 cm. Small amount of fluid in the cul-de-sac.     5 week 6 day intrauterine pregnancy and probable collapsed right ovarian cyst.  No evidence of torsion. Us Ob Transvaginal    Result Date: 1/26/2020  EXAMINATION: TRANSABDOMINAL FIRST TRIMESTER OBSTETRIC PELVIC ULTRASOUND WITH COLOR DOPPLER FLOW 1/26/2020 TECHNIQUE: TRANSABDOMINAL PELVIC ULTRASOUND WITH COLOR DOPPLER FLOW COMPARISON: None HISTORY: ORDERING SYSTEM PROVIDED HISTORY: abdominal pian and preg TECHNOLOGIST PROVIDED HISTORY: abdominal pian and preg FINDINGS: Uterus measures 9.3 x 6 x 5.2 cm.   There is a yolk sac with a fetal pole measuring 2.7 mm corresponding to 5 weeks Weight: 174 lb (78.9 kg)    Height: 5' 4\" (1.626 m)        Medical Decision Making: pelvic rest. Keep appointment with OB  FINAL IMPRESSION      1. Vaginal bleeding in pregnancy          DISPOSITION/PLAN   DISPOSITION Decision To Discharge 01/26/2020 09:25:57 PM      PATIENT REFERRED TO:   [facility] ED  [address] [facility]  [phone]    If symptoms worsen    Ed Began, DO  [facility], [address] [facility]  [phone]            DISCHARGE MEDICATIONS:   There are no discharge medications for this patient.           (Please note that portions of this note were completed with a voice recognition program.  Efforts were made to edit the dictations but occasionally words are mis-transcribed.)    [address] [facility] NP, APRMARLON - CNP  Certified Nurse Practitioner         NIEVES Strong CNP  01/26/20 [id number]       Sanna Garibay MD  02/20/20 8383

## 2020-01-27 NOTE — ED NOTES
Warm blanket given for comfort measures. Awaiting US results at this time.  No further c/o or request.      Louann Son RN  01/26/20 2045

## 2020-01-28 LAB — HCG, PREGNANCY URINE (POC): POSITIVE

## 2020-02-03 ENCOUNTER — NURSE ONLY (OUTPATIENT)
Dept: OBGYN | Age: 25
End: 2020-02-03
Payer: COMMERCIAL

## 2020-02-03 VITALS
BODY MASS INDEX: 29.1 KG/M2 | SYSTOLIC BLOOD PRESSURE: 130 MMHG | HEART RATE: 83 BPM | DIASTOLIC BLOOD PRESSURE: 75 MMHG | WEIGHT: 170.44 LBS | HEIGHT: 64 IN

## 2020-02-03 LAB
CONTROL: PRESENT
PREGNANCY TEST URINE, POC: POSITIVE

## 2020-02-03 PROCEDURE — 99211 OFF/OP EST MAY X REQ PHY/QHP: CPT | Performed by: OBSTETRICS & GYNECOLOGY

## 2020-02-03 PROCEDURE — 81025 URINE PREGNANCY TEST: CPT | Performed by: OBSTETRICS & GYNECOLOGY

## 2020-02-20 NOTE — ED PROVIDER NOTES
eMERGENCY dEPARTMENT eNCOUnter   Independent Attestation     Pt Name: Mai Morse  MRN: [id number]  [place] 1995  Date of evaluation: 2/20/20     Mai Morse is a 21 y.o. female with CC: Bleeding During Pregnancy (onset yesterday, cramping, took 4 home pregnancy test( 4 positive))      Based on the medical record the care appears appropriate. I was personally available for consultation in the Emergency Department.     Angel Patino MD  Attending Emergency Physician                  Elie Jones MD  02/20/20 0432

## 2020-03-02 ENCOUNTER — INITIAL PRENATAL (OUTPATIENT)
Dept: OBGYN | Age: 25
End: 2020-03-02
Payer: COMMERCIAL

## 2020-03-02 VITALS
DIASTOLIC BLOOD PRESSURE: 70 MMHG | HEART RATE: 78 BPM | WEIGHT: 170 LBS | SYSTOLIC BLOOD PRESSURE: 110 MMHG | BODY MASS INDEX: 29.18 KG/M2

## 2020-03-02 PROBLEM — Z87.19 HISTORY OF GI BLEED: Status: ACTIVE | Noted: 2018-04-16

## 2020-03-02 PROBLEM — Z83.2 FAMILY HISTORY OF SICKLE CELL TRAIT: Status: ACTIVE | Noted: 2020-03-02

## 2020-03-02 PROBLEM — N92.1 MENOMETRORRHAGIA: Status: RESOLVED | Noted: 2018-05-10 | Resolved: 2020-03-02

## 2020-03-02 PROBLEM — N89.8 VAGINAL DISCHARGE: Status: RESOLVED | Noted: 2017-01-20 | Resolved: 2020-03-02

## 2020-03-02 PROBLEM — Z30.09 FAMILY PLANNING: Status: RESOLVED | Noted: 2017-01-20 | Resolved: 2020-03-02

## 2020-03-02 PROBLEM — R42 DIZZINESS: Status: RESOLVED | Noted: 2018-04-17 | Resolved: 2020-03-02

## 2020-03-02 PROBLEM — Z92.89 HISTORY OF BLOOD TRANSFUSION: Status: ACTIVE | Noted: 2020-03-02

## 2020-03-02 PROBLEM — Z3A.11 11 WEEKS GESTATION OF PREGNANCY: Status: ACTIVE | Noted: 2020-03-02

## 2020-03-02 PROBLEM — R10.30 LOWER ABDOMINAL PAIN: Status: RESOLVED | Noted: 2018-04-26 | Resolved: 2020-03-02

## 2020-03-02 PROBLEM — D50.0 IRON DEFICIENCY ANEMIA DUE TO CHRONIC BLOOD LOSS: Status: RESOLVED | Noted: 2018-04-18 | Resolved: 2020-03-02

## 2020-03-02 PROBLEM — D64.9 SYMPTOMATIC ANEMIA: Status: RESOLVED | Noted: 2018-04-17 | Resolved: 2020-03-02

## 2020-03-02 PROCEDURE — 99211 OFF/OP EST MAY X REQ PHY/QHP: CPT | Performed by: OBSTETRICS & GYNECOLOGY

## 2020-03-02 PROCEDURE — H1000 PRENATAL CARE ATRISK ASSESSM: HCPCS | Performed by: OBSTETRICS & GYNECOLOGY

## 2020-03-02 PROCEDURE — 99212 OFFICE O/P EST SF 10 MIN: CPT | Performed by: OBSTETRICS & GYNECOLOGY

## 2020-03-02 RX ORDER — PYRIDOXINE HCL (VITAMIN B6) 50 MG
25 TABLET ORAL 3 TIMES DAILY
Qty: 90 TABLET | Refills: 1 | Status: SHIPPED | OUTPATIENT
Start: 2020-03-02 | End: 2020-09-09

## 2020-03-02 RX ORDER — IBUPROFEN 200 MG
1 TABLET ORAL DAILY
Qty: 30 TABLET | Refills: 12 | Status: SHIPPED | OUTPATIENT
Start: 2020-03-02 | End: 2020-10-21 | Stop reason: ALTCHOICE

## 2020-03-02 ASSESSMENT — PATIENT HEALTH QUESTIONNAIRE - PHQ9
SUM OF ALL RESPONSES TO PHQ QUESTIONS 1-9: 0
1. LITTLE INTEREST OR PLEASURE IN DOING THINGS: 0
SUM OF ALL RESPONSES TO PHQ9 QUESTIONS 1 & 2: 0
2. FEELING DOWN, DEPRESSED OR HOPELESS: 0
SUM OF ALL RESPONSES TO PHQ QUESTIONS 1-9: 0

## 2020-03-02 NOTE — PROGRESS NOTES
Sw met with pt to complete the initial assessment for pathways. Pt reports that she is employed through [employer] and is applying for benefits through [employer]. Pt declines referral to pathways based upon the fact that she brings home approximately \"$3000 per month by herself and without the help of the FOB. \"    Pt reports her support system as really good. Se reports that it is made up of her mom and [employer].

## 2020-03-04 ENCOUNTER — HOSPITAL ENCOUNTER (EMERGENCY)
Age: 25
Discharge: HOME | End: 2020-03-04
Attending: EMERGENCY MEDICINE
Payer: COMMERCIAL

## 2020-03-04 ENCOUNTER — TELEPHONE (OUTPATIENT)
Dept: OBGYN | Age: 25
End: 2020-03-04

## 2020-03-04 VITALS
WEIGHT: 170 LBS | BODY MASS INDEX: 29.02 KG/M2 | HEART RATE: 93 BPM | SYSTOLIC BLOOD PRESSURE: 120 MMHG | RESPIRATION RATE: 18 BRPM | OXYGEN SATURATION: 100 % | DIASTOLIC BLOOD PRESSURE: 78 MMHG | HEIGHT: 64 IN | TEMPERATURE: 98.7 F

## 2020-03-04 LAB
-: ABNORMAL
AMORPHOUS: ABNORMAL
BACTERIA: ABNORMAL
BILIRUBIN URINE: NEGATIVE
CASTS UA: ABNORMAL /LPF (ref 0–8)
COLOR: YELLOW
COMMENT UA: ABNORMAL
CRYSTALS, UA: ABNORMAL /HPF
DIRECT EXAM: ABNORMAL
EPITHELIAL CELLS UA: ABNORMAL /HPF (ref 0–5)
GLUCOSE URINE: NEGATIVE
KETONES, URINE: NEGATIVE
LEUKOCYTE ESTERASE, URINE: ABNORMAL
Lab: ABNORMAL
MUCUS: ABNORMAL
NITRITE, URINE: NEGATIVE
OTHER OBSERVATIONS UA: ABNORMAL
PH UA: 8.5 (ref 5–8)
PROTEIN UA: ABNORMAL
RBC UA: ABNORMAL /HPF (ref 0–4)
RENAL EPITHELIAL, UA: ABNORMAL /HPF
SPECIFIC GRAVITY UA: 1.02 (ref 1–1.03)
SPECIMEN DESCRIPTION: ABNORMAL
TRICHOMONAS: ABNORMAL
TURBIDITY: ABNORMAL
URINE HGB: ABNORMAL
UROBILINOGEN, URINE: NORMAL
WBC UA: ABNORMAL /HPF (ref 0–5)
YEAST: ABNORMAL

## 2020-03-04 PROCEDURE — 99284 EMERGENCY DEPT VISIT MOD MDM: CPT

## 2020-03-04 PROCEDURE — 87591 N.GONORRHOEAE DNA AMP PROB: CPT

## 2020-03-04 PROCEDURE — 87491 CHLMYD TRACH DNA AMP PROBE: CPT

## 2020-03-04 PROCEDURE — 87660 TRICHOMONAS VAGIN DIR PROBE: CPT

## 2020-03-04 PROCEDURE — 87480 CANDIDA DNA DIR PROBE: CPT

## 2020-03-04 PROCEDURE — 81001 URINALYSIS AUTO W/SCOPE: CPT

## 2020-03-04 PROCEDURE — 87086 URINE CULTURE/COLONY COUNT: CPT

## 2020-03-04 PROCEDURE — 87510 GARDNER VAG DNA DIR PROBE: CPT

## 2020-03-04 RX ORDER — CEPHALEXIN 500 MG/1
500 CAPSULE ORAL 2 TIMES DAILY
Qty: 14 CAPSULE | Refills: 0 | Status: SHIPPED | OUTPATIENT
Start: 2020-03-04 | End: 2020-03-11

## 2020-03-04 RX ORDER — METRONIDAZOLE 7.5 MG/G
GEL VAGINAL
Qty: 1 TUBE | Refills: 0 | Status: SHIPPED | OUTPATIENT
Start: 2020-03-04 | End: 2020-03-11

## 2020-03-04 ASSESSMENT — ENCOUNTER SYMPTOMS
SHORTNESS OF BREATH: 0
VOMITING: 0
NAUSEA: 0
ABDOMINAL DISTENTION: 0
BACK PAIN: 1
SORE THROAT: 0
WHEEZING: 0
CONSTIPATION: 0
RHINORRHEA: 0
DIARRHEA: 0
COUGH: 0

## 2020-03-04 NOTE — ED PROVIDER NOTES
VITAMIN B-6) 50 MG tablet Take 0.5 tablets by mouth three times daily 3/2/20 3/2/21 Yes Rachael Bear MD       REVIEW OF SYSTEMS    (2-9 systems for level 4, 10 or more for level 5)      Review of Systems   Constitutional: Negative for activity change, appetite change, fatigue and fever. HENT: Negative for congestion, rhinorrhea and sore throat. Respiratory: Negative for cough, shortness of breath and wheezing. Cardiovascular: Negative for chest pain, palpitations and leg swelling. Gastrointestinal: Negative for abdominal distention, constipation, diarrhea, nausea and vomiting. Genitourinary: Positive for vaginal bleeding and vaginal discharge. Negative for decreased urine volume and dysuria. Musculoskeletal: Positive for back pain. Skin: Negative for rash. Neurological: Negative for dizziness, weakness, light-headedness, numbness and headaches. PHYSICAL EXAM   (up to 7 for level 4, 8 or more for level 5)     INITIAL VITALS:   /78   Pulse 93   Temp 98.7 °F (37.1 °C) (Oral)   Resp 18   Ht 5' 4\" (1.626 m)   Wt 170 lb (77.1 kg)   LMP 12/16/2019 (Exact Date)   SpO2 100%   BMI 29.18 kg/m²     Physical Exam  Vitals signs and nursing note reviewed. Constitutional:       Comments: Nontoxic appearing, answering all questions appropriately no signs of distress   HENT:      Head: Normocephalic and atraumatic. Eyes:      General:         Right eye: No discharge. Left eye: No discharge. Conjunctiva/sclera: Conjunctivae normal.   Cardiovascular:      Rate and Rhythm: Normal rate and regular rhythm. Heart sounds: Normal heart sounds. No murmur. No friction rub. No gallop. Pulmonary:      Effort: Pulmonary effort is normal. No respiratory distress. Breath sounds: Normal breath sounds. No stridor. No wheezing, rhonchi or rales. Chest:      Chest wall: No tenderness. Abdominal:      General: There is no distension. Palpations: Abdomen is soft.  There is no mass.      Tenderness: There is no abdominal tenderness. There is no right CVA tenderness, left CVA tenderness, guarding or rebound. Hernia: No hernia is present. Genitourinary:     Comments:  exam performed with RN present in the room. Cervical loss is closed, there is an inclusion cyst noted at the 4 o'clock position as well as some friability noted surrounding the cervical loss and some discharge noted from the office. With some blood-tinged. No cervical motion tenderness no adnexal tenderness bilaterally. Skin:     General: Skin is warm and dry. Findings: No rash. Neurological:      Mental Status: She is alert and oriented to person, place, and time. DIFFERENTIAL  DIAGNOSIS     With concern for threatened . She is 11 weeks pregnant bedside ultrasound did show positive fetal movement with fetal heart tones at 150. Plan for pelvic exam her blood type is a positive per the chart which patient did confirm and a will get urine analysis rule out infectious etiology. PLAN (LABS / IMAGING / EKG):  Orders Placed This Encounter   Procedures    C.trachomatis N.gonorrhoeae DNA    VAGINITIS DNA PROBE    Culture, Urine    URINALYSIS    Microscopic Urinalysis    Vaginal exam       MEDICATIONS ORDERED:  Orders Placed This Encounter   Medications    cephALEXin (KEFLEX) 500 MG capsule     Sig: Take 1 capsule by mouth 2 times daily for 7 days     Dispense:  14 capsule     Refill:  0    metroNIDAZOLE (METROGEL VAGINAL) 0.75 % vaginal gel     Sig: Place vaginally 2 times daily for 7 days. Dispense:  1 Tube     Refill:  0       DIAGNOSTIC RESULTS / EMERGENCY DEPARTMENT COURSE / MDM     LABS:  Results for orders placed or performed during the hospital encounter of 20   VAGINITIS DNA PROBE   Result Value Ref Range    Specimen Description . VAGINA     Special Requests NOT REPORTED     Direct Exam POSITIVE for Gardnerella vaginalis. (A)     Direct Exam NEGATIVE for Candida sp.

## 2020-03-05 LAB
C TRACH DNA GENITAL QL NAA+PROBE: NEGATIVE
CULTURE: NO GROWTH
Lab: NORMAL
N. GONORRHOEAE DNA: NEGATIVE
SPECIMEN DESCRIPTION: NORMAL
SPECIMEN DESCRIPTION: NORMAL

## 2020-03-06 ENCOUNTER — HOSPITAL ENCOUNTER (EMERGENCY)
Age: 25
Discharge: HOME | End: 2020-03-06
Attending: EMERGENCY MEDICINE
Payer: COMMERCIAL

## 2020-03-06 VITALS
BODY MASS INDEX: 28.97 KG/M2 | HEIGHT: 64 IN | TEMPERATURE: 99.1 F | WEIGHT: 169.7 LBS | OXYGEN SATURATION: 99 % | RESPIRATION RATE: 16 BRPM | SYSTOLIC BLOOD PRESSURE: 121 MMHG | DIASTOLIC BLOOD PRESSURE: 71 MMHG | HEART RATE: 107 BPM

## 2020-03-06 LAB
DIRECT EXAM: ABNORMAL
Lab: ABNORMAL
SPECIMEN DESCRIPTION: ABNORMAL

## 2020-03-06 PROCEDURE — 6370000000 HC RX 637 (ALT 250 FOR IP): Performed by: EMERGENCY MEDICINE

## 2020-03-06 PROCEDURE — 99282 EMERGENCY DEPT VISIT SF MDM: CPT

## 2020-03-06 PROCEDURE — 87880 STREP A ASSAY W/OPTIC: CPT

## 2020-03-06 RX ORDER — AMOXICILLIN 500 MG/1
500 CAPSULE ORAL ONCE
Status: COMPLETED | OUTPATIENT
Start: 2020-03-06 | End: 2020-03-06

## 2020-03-06 RX ORDER — AMOXICILLIN 500 MG/1
500 CAPSULE ORAL 2 TIMES DAILY
Qty: 20 CAPSULE | Refills: 0 | Status: SHIPPED | OUTPATIENT
Start: 2020-03-06 | End: 2020-03-16

## 2020-03-06 RX ADMIN — AMOXICILLIN 500 MG: 500 CAPSULE ORAL at 01:45

## 2020-03-06 ASSESSMENT — ENCOUNTER SYMPTOMS
TROUBLE SWALLOWING: 0
COUGH: 0
RHINORRHEA: 0
VOICE CHANGE: 0
ABDOMINAL PAIN: 0

## 2020-03-06 ASSESSMENT — PAIN SCALES - GENERAL: PAINLEVEL_OUTOF10: 8

## 2020-03-16 ENCOUNTER — HOSPITAL ENCOUNTER (OUTPATIENT)
Age: 25
Setting detail: SPECIMEN
Discharge: HOME | End: 2020-03-16
Payer: COMMERCIAL

## 2020-03-16 ENCOUNTER — ROUTINE PRENATAL (OUTPATIENT)
Dept: PERINATAL CARE | Age: 25
End: 2020-03-16
Payer: COMMERCIAL

## 2020-03-16 VITALS
HEART RATE: 70 BPM | HEIGHT: 64 IN | RESPIRATION RATE: 16 BRPM | DIASTOLIC BLOOD PRESSURE: 70 MMHG | WEIGHT: 169 LBS | SYSTOLIC BLOOD PRESSURE: 116 MMHG | TEMPERATURE: 98.5 F | BODY MASS INDEX: 28.85 KG/M2

## 2020-03-16 LAB
-: ABNORMAL
ABO/RH: NORMAL
ABSOLUTE EOS #: 0.05 K/UL (ref 0–0.44)
ABSOLUTE IMMATURE GRANULOCYTE: <0.03 K/UL (ref 0–0.3)
ABSOLUTE LYMPH #: 1.35 K/UL (ref 1.1–3.7)
ABSOLUTE MONO #: 0.38 K/UL (ref 0.1–1.2)
AMORPHOUS: ABNORMAL
AMPHETAMINE SCREEN URINE: NEGATIVE
ANTIBODY SCREEN: NEGATIVE
BACTERIA: ABNORMAL
BARBITURATE SCREEN URINE: NEGATIVE
BASOPHILS # BLD: 1 % (ref 0–2)
BASOPHILS ABSOLUTE: 0.03 K/UL (ref 0–0.2)
BENZODIAZEPINE SCREEN, URINE: NEGATIVE
BILIRUBIN URINE: NEGATIVE
BUPRENORPHINE URINE: NORMAL
CANNABINOID SCREEN URINE: NEGATIVE
CASTS UA: ABNORMAL /LPF (ref 0–8)
COCAINE METABOLITE, URINE: NEGATIVE
COLOR: YELLOW
CRL: NORMAL
CRYSTALS, UA: ABNORMAL /HPF
DIFFERENTIAL TYPE: ABNORMAL
EOSINOPHILS RELATIVE PERCENT: 1 % (ref 1–4)
EPITHELIAL CELLS UA: ABNORMAL /HPF (ref 0–5)
GLUCOSE URINE: NEGATIVE
HCT VFR BLD CALC: 34.2 % (ref 36.3–47.1)
HEMOGLOBIN: 11 G/DL (ref 11.9–15.1)
HEPATITIS B SURFACE ANTIGEN: NONREACTIVE
HIV AG/AB: NONREACTIVE
IMMATURE GRANULOCYTES: 0 %
KETONES, URINE: NEGATIVE
LEUKOCYTE ESTERASE, URINE: ABNORMAL
LYMPHOCYTES # BLD: 24 % (ref 24–43)
MCH RBC QN AUTO: 29.3 PG (ref 25.2–33.5)
MCHC RBC AUTO-ENTMCNC: 32.2 G/DL (ref 28.4–34.8)
MCV RBC AUTO: 91.2 FL (ref 82.6–102.9)
MDMA URINE: NORMAL
METHADONE SCREEN, URINE: NEGATIVE
METHAMPHETAMINE, URINE: NORMAL
MONOCYTES # BLD: 7 % (ref 3–12)
MUCUS: ABNORMAL
NITRITE, URINE: NEGATIVE
NRBC AUTOMATED: 0 PER 100 WBC
OPIATES, URINE: NEGATIVE
OTHER OBSERVATIONS UA: ABNORMAL
OXYCODONE SCREEN URINE: NEGATIVE
PDW BLD-RTO: 14.6 % (ref 11.8–14.4)
PH UA: 8 (ref 5–8)
PHENCYCLIDINE, URINE: NEGATIVE
PLATELET # BLD: 313 K/UL (ref 138–453)
PLATELET ESTIMATE: ABNORMAL
PMV BLD AUTO: 11.2 FL (ref 8.1–13.5)
PROPOXYPHENE, URINE: NORMAL
PROTEIN UA: ABNORMAL
RBC # BLD: 3.75 M/UL (ref 3.95–5.11)
RBC # BLD: ABNORMAL 10*6/UL
RBC UA: ABNORMAL /HPF (ref 0–4)
RENAL EPITHELIAL, UA: ABNORMAL /HPF
RUBV IGG SER QL: 30.7 IU/ML
SAC DIAMETER: NORMAL
SEG NEUTROPHILS: 67 % (ref 36–65)
SEGMENTED NEUTROPHILS ABSOLUTE COUNT: 3.79 K/UL (ref 1.5–8.1)
SPECIFIC GRAVITY UA: 1.02 (ref 1–1.03)
T. PALLIDUM, IGG: NONREACTIVE
TEST INFORMATION: NORMAL
TRICHOMONAS: ABNORMAL
TRICYCLIC ANTIDEPRESSANTS, UR: NORMAL
TURBIDITY: ABNORMAL
URINE HGB: NEGATIVE
UROBILINOGEN, URINE: NORMAL
WBC # BLD: 5.6 K/UL (ref 3.5–11.3)
WBC # BLD: ABNORMAL 10*3/UL
WBC UA: ABNORMAL /HPF (ref 0–5)
YEAST: ABNORMAL

## 2020-03-16 PROCEDURE — 76801 OB US < 14 WKS SINGLE FETUS: CPT | Performed by: OBSTETRICS & GYNECOLOGY

## 2020-03-16 PROCEDURE — 76813 OB US NUCHAL MEAS 1 GEST: CPT | Performed by: OBSTETRICS & GYNECOLOGY

## 2020-03-17 ENCOUNTER — INITIAL PRENATAL (OUTPATIENT)
Dept: OBGYN | Age: 25
End: 2020-03-17
Payer: COMMERCIAL

## 2020-03-17 ENCOUNTER — HOSPITAL ENCOUNTER (OUTPATIENT)
Age: 25
Setting detail: SPECIMEN
Discharge: HOME | End: 2020-03-17
Payer: COMMERCIAL

## 2020-03-17 VITALS
BODY MASS INDEX: 29.03 KG/M2 | HEART RATE: 123 BPM | DIASTOLIC BLOOD PRESSURE: 86 MMHG | WEIGHT: 169.1 LBS | SYSTOLIC BLOOD PRESSURE: 137 MMHG

## 2020-03-17 PROBLEM — Z3A.11 11 WEEKS GESTATION OF PREGNANCY: Status: RESOLVED | Noted: 2020-03-02 | Resolved: 2020-03-17

## 2020-03-17 PROBLEM — N88.8 NABOTHIAN CYST: Status: ACTIVE | Noted: 2020-03-17

## 2020-03-17 PROBLEM — Z87.898 HX OF HEADACHE: Status: ACTIVE | Noted: 2020-03-17

## 2020-03-17 LAB
CULTURE: NORMAL
HGB ELECTROPHORESIS INTERP: NORMAL
Lab: NORMAL
PATHOLOGIST: NORMAL
SPECIMEN DESCRIPTION: NORMAL

## 2020-03-17 PROCEDURE — 90686 IIV4 VACC NO PRSV 0.5 ML IM: CPT | Performed by: STUDENT IN AN ORGANIZED HEALTH CARE EDUCATION/TRAINING PROGRAM

## 2020-03-17 PROCEDURE — 99213 OFFICE O/P EST LOW 20 MIN: CPT | Performed by: STUDENT IN AN ORGANIZED HEALTH CARE EDUCATION/TRAINING PROGRAM

## 2020-03-17 PROCEDURE — 96372 THER/PROPH/DIAG INJ SC/IM: CPT | Performed by: STUDENT IN AN ORGANIZED HEALTH CARE EDUCATION/TRAINING PROGRAM

## 2020-03-17 PROCEDURE — 0500F INITIAL PRENATAL CARE VISIT: CPT | Performed by: STUDENT IN AN ORGANIZED HEALTH CARE EDUCATION/TRAINING PROGRAM

## 2020-03-17 NOTE — PROGRESS NOTES
[facility] OB/GYN  Initial Prenatal Visit    CC: Initial Prenatal Visit    HPI:   Travis Florian is a 21 y.o. female  at 13w1d  She is being seen today for her first obstetrical visit. Pregnancy history fully reviewed. This is not a planned pregnancy. Her LMP is 19. Her obstetrical history is significant for Hx of GI bleed in 2018 needing a blood transfusion at that time, she followed with GI at that time but does not any longer, hx of headaches, FHx of sickle cell trait (Maternal aunt). The patient requested the influenza vaccine this year. Today she complains of nothing today. There was negative fetal movements. She denies contractions, vaginal bleeding and leakage of fluid. Signs and symptoms of  labor as well as labor were reviewed. The S/S of Pre-Eclampsia were reviewed with the patient in detail. She is to report any of these if they occur. She currently denies any of these. Relationship with FOB: relationship not living together  Mother's ethnicity:   Father's ethnicity:   Patient and father of the baby Family History:    - Denies history of neural tube defects   - Denies history of congenital birth defects    - Denies history of genetic disorders/chromosomal abnormalities   - Denies history of diabetes mellitus  Genetic screening was discussed and pt requests it, first trimester screen completed yesterday.       OB History:  OB History    Para Term  AB Living   1 0 0 0 0 0   SAB TAB Ectopic Molar Multiple Live Births   0 0 0 0 0 0      # Outcome Date GA Lbr Jonh/2nd Weight Sex Delivery Anes PTL Lv   1 Current                Past Medical History:  Past Medical History:   Diagnosis Date    History of blood transfusion 2018    From Motrin use     Menometrorrhagia 5/10/2018    Symptomatic anemia 2018    From Motrin use     Vision abnormalities        Past Surgical History:  Past Surgical History:   Procedure Laterality Date    Not on file     Active member of club or organization: Not on file     Attends meetings of clubs or organizations: Not on file     Relationship status: Not on file    Intimate partner violence     Fear of current or ex partner: Not on file     Emotionally abused: Not on file     Physically abused: Not on file     Forced sexual activity: Not on file   Other Topics Concern    Not on file   Social History Narrative    Not on file       Family History:  Family History   Problem Relation Age of Onset    No Known Problems Mother     No Known Problems Father     Other Maternal Grandmother         diverticulitis    No Known Problems Paternal Grandfather     No Known Problems Paternal Grandmother     No Known Problems Maternal Grandfather     No Known Problems Brother     No Known Problems Sister     Breast Cancer Neg Hx     Cancer Neg Hx     Colon Cancer Neg Hx     Diabetes Neg Hx     Eclampsia Neg Hx     Hypertension Neg Hx     Ovarian Cancer Neg Hx      Labor Neg Hx     Spont Abortions Neg Hx     Stroke Neg Hx        Vitals:  BP: 137/86  Weight: 169 lb 1.6 oz (76.7 kg)  Pulse: 123  Patient Position: Sitting  Albumin: Trace  Glucose: Negative  Fetal Heart Rate: 130     Physical Exam: Completed, See Epic Navigator      OMM Exam:  The patient did not complain of a chief complaint requiring OMM. Chief Complaint:none    Structural Exam: No Interest    Assessment & Plan:  Wade Qureshi is a 21 y.o. female  at 13w1d Initial Obstetrical Visit   - The patient was seen full history and physical was completed/reviewed.     - Problem list reviewed and updated   - Prenatal labs completed and reviewed with patient   - Prenatal vitamins prescription Given   - Aspirin indication:  ethnicity and primiparity   - NT Screen completed 3/16/20 and pending   - Role of ultrasound in pregnancy discussed: MFM completed with next ultrasound 2020 for anatomy scan   - Gc/Chlam Cultures &

## 2020-03-17 NOTE — PROGRESS NOTES
Attending Physician Statement  I have discussed the care of [address], including pertinent history and exam findings,  with the resident. I have reviewed the key elements of all parts of the encounter with the resident. I agree with the assessment, plan and orders as documented by the resident.   (GE Modifier)      Day Jackson DO

## 2020-03-18 PROBLEM — O09.899 SUSCEPTIBLE TO VARICELLA (NON-IMMUNE), CURRENTLY PREGNANT: Status: ACTIVE | Noted: 2020-03-18

## 2020-03-18 PROBLEM — Z28.39 SUSCEPTIBLE TO VARICELLA (NON-IMMUNE), CURRENTLY PREGNANT: Status: ACTIVE | Noted: 2020-03-18

## 2020-03-18 LAB — VZV IGG SER QL IA: 0.18

## 2020-03-23 LAB — CYSTIC FIBROSIS: NORMAL

## 2020-03-24 LAB — CYTOLOGY REPORT: NORMAL

## 2020-04-17 ENCOUNTER — TELEPHONE (OUTPATIENT)
Dept: OBGYN | Age: 25
End: 2020-04-17

## 2020-04-21 ENCOUNTER — TELEPHONE (OUTPATIENT)
Dept: OBGYN | Age: 25
End: 2020-04-21

## 2020-04-30 ENCOUNTER — TELEPHONE (OUTPATIENT)
Dept: OBGYN | Age: 25
End: 2020-04-30

## 2020-05-04 ENCOUNTER — ROUTINE PRENATAL (OUTPATIENT)
Dept: PERINATAL CARE | Age: 25
End: 2020-05-04
Payer: COMMERCIAL

## 2020-05-04 VITALS
WEIGHT: 170 LBS | SYSTOLIC BLOOD PRESSURE: 122 MMHG | TEMPERATURE: 98.4 F | HEIGHT: 64 IN | BODY MASS INDEX: 29.02 KG/M2 | DIASTOLIC BLOOD PRESSURE: 72 MMHG | RESPIRATION RATE: 14 BRPM | HEART RATE: 82 BPM

## 2020-05-04 PROCEDURE — 76817 TRANSVAGINAL US OBSTETRIC: CPT | Performed by: OBSTETRICS & GYNECOLOGY

## 2020-05-04 PROCEDURE — 76805 OB US >/= 14 WKS SNGL FETUS: CPT | Performed by: OBSTETRICS & GYNECOLOGY

## 2020-05-13 ENCOUNTER — HOSPITAL ENCOUNTER (OUTPATIENT)
Age: 25
Discharge: HOME | End: 2020-05-13
Payer: COMMERCIAL

## 2020-05-13 PROCEDURE — 82105 ALPHA-FETOPROTEIN SERUM: CPT

## 2020-05-13 PROCEDURE — 36415 COLL VENOUS BLD VENIPUNCTURE: CPT

## 2020-05-15 ENCOUNTER — HOSPITAL ENCOUNTER (EMERGENCY)
Age: 25
Discharge: HOME | End: 2020-05-16
Attending: EMERGENCY MEDICINE
Payer: COMMERCIAL

## 2020-05-15 VITALS
TEMPERATURE: 99 F | HEART RATE: 97 BPM | RESPIRATION RATE: 16 BRPM | WEIGHT: 174.31 LBS | BODY MASS INDEX: 29.92 KG/M2 | OXYGEN SATURATION: 96 % | DIASTOLIC BLOOD PRESSURE: 77 MMHG | SYSTOLIC BLOOD PRESSURE: 134 MMHG

## 2020-05-15 PROCEDURE — 99282 EMERGENCY DEPT VISIT SF MDM: CPT

## 2020-05-15 PROCEDURE — 10060 I&D ABSCESS SIMPLE/SINGLE: CPT

## 2020-05-15 RX ORDER — CEPHALEXIN 500 MG/1
500 CAPSULE ORAL 4 TIMES DAILY
Qty: 40 CAPSULE | Refills: 0 | Status: SHIPPED | OUTPATIENT
Start: 2020-05-15 | End: 2020-05-25

## 2020-05-15 RX ORDER — CEPHALEXIN 500 MG/1
500 CAPSULE ORAL ONCE
Status: COMPLETED | OUTPATIENT
Start: 2020-05-16 | End: 2020-05-16

## 2020-05-15 RX ORDER — LIDOCAINE HYDROCHLORIDE 10 MG/ML
10 INJECTION, SOLUTION INFILTRATION; PERINEURAL ONCE
Status: DISCONTINUED | OUTPATIENT
Start: 2020-05-15 | End: 2020-05-16 | Stop reason: HOSPADM

## 2020-05-15 RX ORDER — ACETAMINOPHEN 325 MG/1
650 TABLET ORAL EVERY 6 HOURS PRN
Qty: 30 TABLET | Refills: 0 | Status: SHIPPED | OUTPATIENT
Start: 2020-05-15 | End: 2020-09-09

## 2020-05-15 ASSESSMENT — PAIN DESCRIPTION - PAIN TYPE: TYPE: ACUTE PAIN

## 2020-05-15 ASSESSMENT — PAIN SCALES - GENERAL
PAINLEVEL_OUTOF10: 10
PAINLEVEL_OUTOF10: 10

## 2020-05-15 ASSESSMENT — PAIN DESCRIPTION - DESCRIPTORS: DESCRIPTORS: DISCOMFORT

## 2020-05-15 ASSESSMENT — ENCOUNTER SYMPTOMS: COLOR CHANGE: 1

## 2020-05-15 ASSESSMENT — PAIN DESCRIPTION - LOCATION: LOCATION: LEG

## 2020-05-15 NOTE — LETTER
[facility] ED  [address] [id number]  Phone: [phone]             May 16, 2020    Patient: Bogdan Parikh   YOB: 1995   Date of Visit: 5/15/2020       To Whom It May Concern:    Dejan Avendano was seen and treated in our emergency department on 5/15/2020. She may return to work on 5/18/2020.       Sincerely,             Signature:__________________________________

## 2020-05-16 PROCEDURE — 6370000000 HC RX 637 (ALT 250 FOR IP): Performed by: NURSE PRACTITIONER

## 2020-05-16 RX ADMIN — CEPHALEXIN 500 MG: 500 CAPSULE ORAL at 00:02

## 2020-05-16 NOTE — ED PROVIDER NOTES
[facility] ED  EMERGENCY DEPARTMENT ENCOUNTER      Pt Name: Ebony Harmon  MRN: [id number]  [place] 1995  Date of evaluation: 5/15/2020  Provider: NIEVES Stephenson CNP    CHIEF COMPLAINT       Chief Complaint   Patient presents with    Cyst     cyst on thigh         HISTORY OFPRESENT ILLNESS  (Location/Symptom, Timing/Onset, Context/Setting, Quality, Duration, Modifying Factors, Severity.)   Ebony Harmon is a 21 y.o. female who presents to the emergency department for evaluation of abscess in the left inner thigh area that started several days ago. Patient thinks he may have an infected hair. Pain is a 10 out of 10. Patient also states she is pregnant. Nursing Notes were reviewed. PASTMEDICAL HISTORY     Past Medical History:   Diagnosis Date    History of blood transfusion 2018    From Motrin use     Menometrorrhagia 5/10/2018    Symptomatic anemia 4/17/2018    From Motrin use     Vision abnormalities          SURGICAL HISTORY       Past Surgical History:   Procedure Laterality Date    COLONOSCOPY  06/01/2018    No lesions seen    NM COLON CA SCRN NOT HI RSK IND N/A 6/1/2018    COLONOSCOPY performed by Dayana Alcantara MD at [address]  04/18/2018    No lesions seen up to the 2nd part of the duodenum.  UPPER GASTROINTESTINAL ENDOSCOPY N/A 4/18/2018    EGD DIAGNOSTIC ONLY performed by Dayana Alc[facility]ara MD at [facility]     Discharge Medication List as of 5/15/2020 11:49 PM      CONTINUE these medications which have NOT CHANGED    Details   Prenatal Multivit-Min-Fe-FA (PRENATAL FORTE) TABS Take 1 tablet by mouth Daily May substitute with any prenatal vit her insurance will cover. , Disp-30 tablet, R-12Normal      pyridoxine (RA VITAMIN B-6) 50 MG tablet Take 0.5 tablets by mouth three times daily, Disp-90 tablet, R-1Normal             ALLERGIES     Motrin [ibuprofen]    FAMILY HISTORY       Family History   Problem capsule Take 1 capsule by mouth 4 times daily for 10 days, Disp-40 capsule, R-0Print      acetaminophen (TYLENOL) 325 MG tablet Take 2 tablets by mouth every 6 hours as needed for Pain, Disp-30 tablet, R-0Print           Electronically signed by NIEVES Martinez 5/16/2020 at 12:19 AM            NIEVES Martinez - CNP  05/16/20 0020

## 2020-05-17 LAB
AFP INTERPRETATION: NORMAL
AFP MOM: 1.01
AFP SPECIMEN: NORMAL
AFP: 71 NG/ML
DATE OF BIRTH: NORMAL
DATING METHOD: NORMAL
DETERMINED BY: NORMAL
DIABETIC: NEGATIVE
DUE DATE: NORMAL
ESTIMATED DUE DATE: NORMAL
FAMILY HISTORY NTD: NEGATIVE
GESTATIONAL AGE: NORMAL
INSULIN REQ DIABETES: NO
LAST MENSTRUAL PERIOD: NORMAL
MATERNAL AGE AT EDD: 25.7 YR
MATERNAL WEIGHT: 170
MONOCHORIONIC TWINS: NORMAL
NUMBER OF FETUSES: NORMAL
PATIENT WEIGHT UNITS: NORMAL
PATIENT WEIGHT: NORMAL
RACE (MATERNAL): NORMAL
RACE: NORMAL
REPEAT SPECIMEN?: NORMAL
SMOKING: NORMAL
SMOKING: NORMAL
VALPROIC/CARBAMAZEP: NORMAL
ZZ NTE CLEAN UP: HISTORY: NO

## 2020-06-02 ENCOUNTER — ROUTINE PRENATAL (OUTPATIENT)
Dept: OBGYN | Age: 25
End: 2020-06-02
Payer: COMMERCIAL

## 2020-06-02 VITALS
BODY MASS INDEX: 29.7 KG/M2 | HEART RATE: 94 BPM | WEIGHT: 173 LBS | SYSTOLIC BLOOD PRESSURE: 124 MMHG | DIASTOLIC BLOOD PRESSURE: 64 MMHG

## 2020-06-02 PROCEDURE — 0502F SUBSEQUENT PRENATAL CARE: CPT | Performed by: STUDENT IN AN ORGANIZED HEALTH CARE EDUCATION/TRAINING PROGRAM

## 2020-06-26 ENCOUNTER — HOSPITAL ENCOUNTER (OUTPATIENT)
Age: 25
Discharge: HOME | End: 2020-06-26
Payer: COMMERCIAL

## 2020-06-26 LAB
-: NORMAL
AMORPHOUS: NORMAL
BACTERIA: NORMAL
BILIRUBIN URINE: NEGATIVE
CASTS UA: NORMAL /LPF (ref 0–8)
COLOR: YELLOW
COMMENT UA: ABNORMAL
CRYSTALS, UA: NORMAL /HPF
EPITHELIAL CELLS UA: NORMAL /HPF (ref 0–5)
GLUCOSE ADMINISTRATION: NORMAL
GLUCOSE TOLERANCE SCREEN 50G: 108 MG/DL (ref 70–135)
GLUCOSE URINE: NEGATIVE
HCT VFR BLD CALC: 30.4 % (ref 36.3–47.1)
HEMOGLOBIN: 9.4 G/DL (ref 11.9–15.1)
KETONES, URINE: NEGATIVE
LEUKOCYTE ESTERASE, URINE: ABNORMAL
MCH RBC QN AUTO: 30.3 PG (ref 25.2–33.5)
MCHC RBC AUTO-ENTMCNC: 30.9 G/DL (ref 28.4–34.8)
MCV RBC AUTO: 98.1 FL (ref 82.6–102.9)
MUCUS: NORMAL
NITRITE, URINE: NEGATIVE
NRBC AUTOMATED: 0 PER 100 WBC
OTHER OBSERVATIONS UA: NORMAL
PDW BLD-RTO: 13.4 % (ref 11.8–14.4)
PH UA: 7 (ref 5–8)
PLATELET # BLD: 239 K/UL (ref 138–453)
PMV BLD AUTO: 11 FL (ref 8.1–13.5)
PROTEIN UA: NEGATIVE
RBC # BLD: 3.1 M/UL (ref 3.95–5.11)
RBC UA: NORMAL /HPF (ref 0–4)
RENAL EPITHELIAL, UA: NORMAL /HPF
SPECIFIC GRAVITY UA: 1.01 (ref 1–1.03)
TRICHOMONAS: NORMAL
TURBIDITY: CLEAR
URINE HGB: NEGATIVE
UROBILINOGEN, URINE: NORMAL
WBC # BLD: 6.9 K/UL (ref 3.5–11.3)
WBC UA: NORMAL /HPF (ref 0–5)
YEAST: NORMAL

## 2020-06-26 PROCEDURE — 36415 COLL VENOUS BLD VENIPUNCTURE: CPT

## 2020-06-26 PROCEDURE — 81001 URINALYSIS AUTO W/SCOPE: CPT

## 2020-06-26 PROCEDURE — 85027 COMPLETE CBC AUTOMATED: CPT

## 2020-06-26 PROCEDURE — 82950 GLUCOSE TEST: CPT

## 2020-06-27 PROBLEM — D64.9 ANEMIA: Status: ACTIVE | Noted: 2020-06-27

## 2020-06-30 ENCOUNTER — ROUTINE PRENATAL (OUTPATIENT)
Dept: OBGYN | Age: 25
End: 2020-06-30
Payer: COMMERCIAL

## 2020-06-30 VITALS
DIASTOLIC BLOOD PRESSURE: 77 MMHG | HEART RATE: 105 BPM | BODY MASS INDEX: 30.38 KG/M2 | WEIGHT: 177 LBS | SYSTOLIC BLOOD PRESSURE: 133 MMHG | TEMPERATURE: 98.2 F

## 2020-06-30 PROCEDURE — 90715 TDAP VACCINE 7 YRS/> IM: CPT | Performed by: OBSTETRICS & GYNECOLOGY

## 2020-06-30 PROCEDURE — 0502F SUBSEQUENT PRENATAL CARE: CPT | Performed by: STUDENT IN AN ORGANIZED HEALTH CARE EDUCATION/TRAINING PROGRAM

## 2020-06-30 PROCEDURE — 96372 THER/PROPH/DIAG INJ SC/IM: CPT | Performed by: STUDENT IN AN ORGANIZED HEALTH CARE EDUCATION/TRAINING PROGRAM

## 2020-06-30 RX ORDER — POLYETHYLENE GLYCOL 3350 17 G/17G
17 POWDER, FOR SOLUTION ORAL PRN
Qty: 1530 G | Refills: 2 | Status: SHIPPED | OUTPATIENT
Start: 2020-06-30 | End: 2020-07-30

## 2020-06-30 RX ORDER — DOCUSATE SODIUM 100 MG/1
100 CAPSULE, LIQUID FILLED ORAL 2 TIMES DAILY
Qty: 60 CAPSULE | Refills: 0 | Status: SHIPPED | OUTPATIENT
Start: 2020-06-30 | End: 2020-07-30

## 2020-06-30 RX ORDER — LANOLIN ALCOHOL/MO/W.PET/CERES
325 CREAM (GRAM) TOPICAL 2 TIMES DAILY
Qty: 90 TABLET | Refills: 3 | Status: ON HOLD | OUTPATIENT
Start: 2020-06-30 | End: 2020-09-03 | Stop reason: HOSPADM

## 2020-06-30 NOTE — PROGRESS NOTES
Prenatal Visit    Dwight Meraz is a 21 y.o. female  at 29w1d    The patient was seen and evaluated. She complains of some vaginal discharge since yesterday. She states she had MetroGel which she used. She thinks that irritation is due to a new laundry detergent. There was positive fetal movements. She denies contractions, vaginal bleeding and leakage of fluid. Signs and symptoms of  labor as well as labor were reviewed. The S/S of Pre-Eclampsia were reviewed with the patient in detail. She is to report any of these if they occur. She currently denies any of these. The patient was instructed on fetal kick counts. She was instructed that the baby should move at a minimum of ten times within one hour after a meal. The patient was instructed to lay down on her left side twenty minutes after eating and count movements for up to one hour with a target value of ten movements. She was instructed to notify the office if she did not make that target after two attempts or if after any attempt there was less than four movements. The patient reports that the targets have been made. The patient requested the T-Dap Vaccine (27-36 weeks) this pregnancy. The patient already received the influenza vaccine this year. 3/17/20    The problem list reflects the active issues addressed during today's visit    Vitals:  Vitals:    20 1433   BP: 133/77   Site: Left Upper Arm   Position: Sitting   Cuff Size: Medium Adult   Pulse: 105   Temp: 98.2 °F (36.8 °C)   TempSrc: Temporal   Weight: 177 lb (80.3 kg)            28 Week Labs:   The patient is RH +, Rhogam not indicated  ABO/Rh   Date Value Ref Range Status   2020 A POSITIVE  Final       1hr GTT: 108   28 week CBC:   Lab Results   Component Value Date    WBC 6.9 2020    HGB 9.4 (L) 2020    HCT 30.4 (L) 2020    MCV 98.1 2020     2020     UA w/ Ur C&S: no growth     Assessment & Plan:  Dwight Meraz is a 21 key elements of all parts of the encounter have been performed/reviewed by me. I agree with the assessment, plan and orders as documented by the resident. This service has been performed in part by a resident under the direction of a teaching physician.         Attending's Name:  Fernando Rollins MD

## 2020-07-14 ENCOUNTER — ROUTINE PRENATAL (OUTPATIENT)
Dept: OBGYN | Age: 25
End: 2020-07-14
Payer: COMMERCIAL

## 2020-07-14 VITALS
SYSTOLIC BLOOD PRESSURE: 124 MMHG | BODY MASS INDEX: 31.17 KG/M2 | WEIGHT: 181.6 LBS | TEMPERATURE: 97.7 F | HEART RATE: 94 BPM | DIASTOLIC BLOOD PRESSURE: 74 MMHG

## 2020-07-14 PROCEDURE — 99212 OFFICE O/P EST SF 10 MIN: CPT | Performed by: STUDENT IN AN ORGANIZED HEALTH CARE EDUCATION/TRAINING PROGRAM

## 2020-07-14 PROCEDURE — 0502F SUBSEQUENT PRENATAL CARE: CPT | Performed by: STUDENT IN AN ORGANIZED HEALTH CARE EDUCATION/TRAINING PROGRAM

## 2020-07-14 RX ORDER — FLUCONAZOLE 100 MG/1
150 TABLET ORAL ONCE
Qty: 2 TABLET | Refills: 0 | Status: SHIPPED | OUTPATIENT
Start: 2020-07-14 | End: 2020-07-14

## 2020-07-14 NOTE — PROGRESS NOTES
Attending Physician Statement  I have discussed the care of [address], including pertinent history and exam findings,  with the resident. I have reviewed the key elements of all parts of the encounter with the resident. I agree with the assessment, plan and orders as documented by the resident.   (GE Modifier)      Tia Laguna DO

## 2020-07-14 NOTE — PROGRESS NOTES
Prenatal Visit    Latonia Napoles is a 21 y.o. female  at 30w1d    The patient was seen and evaluated. She complains of vaginal discharge and is requesting Diflucan. There was positive fetal movements. She denies contractions, vaginal bleeding and leakage of fluid. Signs and symptoms of  labor as well as labor were reviewed. The S/S of Pre-Eclampsia were reviewed with the patient in detail. She is to report any of these if they occur. She currently denies any of these. The patient was instructed on fetal kick counts. She was instructed that the baby should move at a minimum of ten times within one hour after a meal. The patient was instructed to lay down on her left side twenty minutes after eating and count movements for up to one hour with a target value of ten movements. She was instructed to notify the office if she did not make that target after two attempts or if after any attempt there was less than four movements. The patient reports that the targets have been made. The patient already received the T-Dap Vaccine (27-36 weeks) this pregnancy. 20    The problem list reflects the active issues addressed during today's visit    Vitals:  Vitals:    20 0936   BP: 124/74   Site: Left Upper Arm   Position: Sitting   Cuff Size: Small Adult   Pulse: 94   Temp: 97.7 °F (36.5 °C)   TempSrc: Temporal   Weight: 181 lb 9.6 oz (82.4 kg)       BP: 124/74  Weight: 181 lb 9.6 oz (82.4 kg)  Pulse: 94  Patient Position: Sitting  Albumin: Negative  Glucose: Negative  Fundal Height (cm): 30 cm  Fetal Heart Rate: 140  Movement: Present     28 Week Labs:   The patient is RH positive, Rhogam not indicated  ABO/Rh   Date Value Ref Range Status   2020 A POSITIVE  Final       1hr GTT: 108  28 week CBC:   Lab Results   Component Value Date    WBC 6.9 2020    HGB 9.4 (L) 2020    HCT 30.4 (L) 2020    MCV 98.1 2020     2020     UA w/ Ur C&S: no growth

## 2020-07-31 ENCOUNTER — NURSE TRIAGE (OUTPATIENT)
Dept: OTHER | Facility: CLINIC | Age: 25
End: 2020-07-31

## 2020-07-31 NOTE — TELEPHONE ENCOUNTER
Reason for Disposition   [1] COVID-19 EXPOSURE (Close Contact) AND [2] within last 14 days BUT [2] NO symptoms    Answer Assessment - Initial Assessment Questions  1. CLOSE CONTACT: \"Who is the person with the confirmed or suspected COVID-19 infection that you were exposed to? \"      coworker  2. PLACE of CONTACT: \"Where were you when you were exposed to COVID-19? \" (e.g., home, school, medical waiting room; which city?)      work  3. TYPE of CONTACT: \"How much contact was there? \" (e.g., sitting next to, live in same house, work in same office, same building)     Same office  4. DURATION of CONTACT: \"How long were you in contact with the COVID-19 patient? \" (e.g., a few seconds, passed by person, a few minutes, live with the patient)      8 hours  5. DATE of CONTACT: \"When did you have contact with a COVID-19 patient? \" (e.g., how many days ago)      7/25  6. TRAVEL: \"Have you traveled out of the country recently? \" If so, \"When and where? \"      * Also ask about out-of-state travel, since the CDC has identified some high-risk cities for community spread in the [address] [place] [address],[address]. * Note: Travel becomes less relevant if there is widespread community transmission where the patient lives. NO  7. COMMUNITY SPREAD: \"Are there lots of cases of COVID-19 (community spread) where you live? \" (See public health department website, if unsure)        yes  8. SYMPTOMS: \"Do you have any symptoms? \" (e.g., fever, cough, breathing difficulty)      no  9. PREGNANCY OR POSTPARTUM: \"Is there any chance you are pregnant? \" \"When was your last menstrual period? \" \"Did you deliver in the last 2 weeks? \"      yes  10. HIGH RISK: \"Do you have any heart or lung problems? Do you have a weak immune system? \" (e.g., CHF, COPD, asthma, HIV positive, chemotherapy, renal failure, diabetes mellitus, sickle cell anemia)       No    Protocols used: CORONAVIRUS (COVID-19) EXPOSURE-ADULT-[place]    Caller provided care advice and instructed to call back with worsening symptoms.

## 2020-08-11 ENCOUNTER — ROUTINE PRENATAL (OUTPATIENT)
Dept: OBGYN | Age: 25
End: 2020-08-11
Payer: COMMERCIAL

## 2020-08-11 VITALS
BODY MASS INDEX: 32.27 KG/M2 | WEIGHT: 188 LBS | DIASTOLIC BLOOD PRESSURE: 81 MMHG | SYSTOLIC BLOOD PRESSURE: 139 MMHG | HEART RATE: 111 BPM

## 2020-08-11 PROBLEM — N94.6 DYSMENORRHEA: Status: ACTIVE | Noted: 2020-08-11

## 2020-08-11 PROCEDURE — 0502F SUBSEQUENT PRENATAL CARE: CPT | Performed by: STUDENT IN AN ORGANIZED HEALTH CARE EDUCATION/TRAINING PROGRAM

## 2020-08-11 NOTE — PROGRESS NOTES
Prenatal Visit    Funmilayo Head is a 21 y.o. female  at 34w1d    The patient was seen and evaluated. She has no complaints today. She reports positive fetal movements. She denies contractions, vaginal bleeding and leakage of fluid. Signs and symptoms of labor and pre-eclampsia were reviewed with the patient in detail. She is to report any of these if they occur. She currently denies any of these. The patient is Rh positive and Rhogam is not indicated. The patient already received the T-Dap Vaccine (27-36 weeks) this pregnancy. The problem list reflects the active issues addressed during today's visit    Vitals:  BP: 139/81  Weight: 188 lb (85.3 kg)  Pulse: 111  Patient Position: Sitting  Albumin: Negative  Glucose: Negative  Fundal Height (cm): 34 cm  Fetal Heart Rate: 150  Movement: Present     28 week labs: .  1hr GTT: 108   28 week CBC:   Lab Results   Component Value Date    WBC 6.9 2020    HGB 9.4 (L) 2020    HCT 30.4 (L) 2020    MCV 98.1 2020     2020     UA w/ Ur C&S: negative     Assessment & Plan:  Funmilayo Head is a 21 y.o. female  at 34w1d   - 28 week labs completed   - Tdap vaccination: discussed recommendations, patient already received Tdap   - Rhogam: not indicated   - VSS, afebrile, BP normotensive however 139/81   - Continue PNV and Iron daily, patient does not take ASA 81   - Patient denies s/s preE at this time, given strict precautions   - Patient to return in 1 week for prenatal appt and BP check   - Warning signs reviewed and recommendations when to call or present to the hospital if she experiences signs or symptoms of  labor and pre-eclampsia were reviewed. - The patient was instructed on fetal kick counts.  She was instructed that the baby should move at a minimum of ten times within one hour after a meal. The patient was instructed to lay down on her left side twenty minutes after eating and count movements for up to one hour with a target value of ten movements. She was instructed to notify the office if she did not make that target after two attempts or if after any attempt there was less than four movements. Patient Active Problem List    Diagnosis Date Noted    Dysmenorrhea 08/11/2020     Desires Mirena IUD PP      Anemia 06/27/2020     Hgb 9.4 (6/26)      Susceptible to varicella (non-immune), currently pregnant 03/18/2020     Varicella PP      Hx of headaches 03/17/2020    Nabothian cysts 03/17/2020     Noted on pelvic exam at initial exam on 3/17/20      Hx of blood transfusion (2018) 03/02/2020     Due to GI bleed secondary to Motrin use      Family history of sickle cell trait (Pt negative) 03/02/2020     Maunt       Hx of GI bleed (2018) 04/16/2018     Due Motrin use. Pt received blood transfusion        Return in about 1 week (around 8/18/2020).     Lucy Severe,   Ob/Gyn Resident  [facility], [address]  8/11/2020, 11:51 AM

## 2020-08-21 ENCOUNTER — HOSPITAL ENCOUNTER (OUTPATIENT)
Age: 25
Setting detail: SPECIMEN
Discharge: HOME | End: 2020-08-21
Payer: COMMERCIAL

## 2020-08-21 ENCOUNTER — ROUTINE PRENATAL (OUTPATIENT)
Dept: OBGYN | Age: 25
End: 2020-08-21
Payer: COMMERCIAL

## 2020-08-21 VITALS
HEART RATE: 100 BPM | BODY MASS INDEX: 33.13 KG/M2 | DIASTOLIC BLOOD PRESSURE: 84 MMHG | WEIGHT: 193 LBS | SYSTOLIC BLOOD PRESSURE: 142 MMHG

## 2020-08-21 PROBLEM — O16.3 ELEVATED BLOOD PRESSURE AFFECTING PREGNANCY IN THIRD TRIMESTER, ANTEPARTUM: Status: ACTIVE | Noted: 2020-08-21

## 2020-08-21 LAB
ALBUMIN SERPL-MCNC: 3.6 G/DL (ref 3.5–5.2)
ALBUMIN/GLOBULIN RATIO: 1.1 (ref 1–2.5)
ALP BLD-CCNC: 63 U/L (ref 35–104)
ALT SERPL-CCNC: 10 U/L (ref 5–33)
ANION GAP SERPL CALCULATED.3IONS-SCNC: 16 MMOL/L (ref 9–17)
AST SERPL-CCNC: 16 U/L
BILIRUB SERPL-MCNC: 0.17 MG/DL (ref 0.3–1.2)
BUN BLDV-MCNC: 6 MG/DL (ref 6–20)
BUN/CREAT BLD: ABNORMAL (ref 9–20)
CALCIUM SERPL-MCNC: 9.3 MG/DL (ref 8.6–10.4)
CHLORIDE BLD-SCNC: 104 MMOL/L (ref 98–107)
CO2: 18 MMOL/L (ref 20–31)
CREAT SERPL-MCNC: 0.43 MG/DL (ref 0.5–0.9)
CREATININE URINE: 121.7 MG/DL (ref 28–217)
GFR AFRICAN AMERICAN: >60 ML/MIN
GFR NON-AFRICAN AMERICAN: >60 ML/MIN
GFR SERPL CREATININE-BSD FRML MDRD: ABNORMAL ML/MIN/{1.73_M2}
GFR SERPL CREATININE-BSD FRML MDRD: ABNORMAL ML/MIN/{1.73_M2}
GLUCOSE BLD-MCNC: 111 MG/DL (ref 70–99)
HCT VFR BLD CALC: 31 % (ref 36.3–47.1)
HEMOGLOBIN: 9.8 G/DL (ref 11.9–15.1)
MCH RBC QN AUTO: 29.8 PG (ref 25.2–33.5)
MCHC RBC AUTO-ENTMCNC: 31.6 G/DL (ref 28.4–34.8)
MCV RBC AUTO: 94.2 FL (ref 82.6–102.9)
NRBC AUTOMATED: 0 PER 100 WBC
PDW BLD-RTO: 14.5 % (ref 11.8–14.4)
PLATELET # BLD: 204 K/UL (ref 138–453)
PMV BLD AUTO: 11.3 FL (ref 8.1–13.5)
POTASSIUM SERPL-SCNC: 3.6 MMOL/L (ref 3.7–5.3)
RBC # BLD: 3.29 M/UL (ref 3.95–5.11)
SODIUM BLD-SCNC: 138 MMOL/L (ref 135–144)
TOTAL PROTEIN: 6.8 G/DL (ref 6.4–8.3)
WBC # BLD: 6.4 K/UL (ref 3.5–11.3)

## 2020-08-21 PROCEDURE — 99211 OFF/OP EST MAY X REQ PHY/QHP: CPT | Performed by: STUDENT IN AN ORGANIZED HEALTH CARE EDUCATION/TRAINING PROGRAM

## 2020-08-21 PROCEDURE — 0502F SUBSEQUENT PRENATAL CARE: CPT | Performed by: STUDENT IN AN ORGANIZED HEALTH CARE EDUCATION/TRAINING PROGRAM

## 2020-08-21 NOTE — LETTER
[address] [address]  Phone: [phone]  Fax: [phone]         August 21, 2020     Patient: Watson Cunha   YOB: 1995   Date of Visit: 8/21/2020       To Whom It May Concern:    Jose Nelson is pregnant. She is to have access to a chair at all times with the ability to sit as needed.       Sincerely,    Darshan Owens DO        Signature:__________________________________

## 2020-08-22 LAB
CULTURE: NORMAL
Lab: NORMAL
SPECIMEN DESCRIPTION: NORMAL

## 2020-08-24 LAB
CULTURE: NORMAL
Lab: NORMAL
SPECIMEN DESCRIPTION: NORMAL

## 2020-08-24 NOTE — PROGRESS NOTES
I have discussed the care of the patient including pertinent history and examination findings with the resident. I agree with the assessment, and and orders as documented  by the resident. GE Modifier: This service has been performed by a resident physician under the direction of a teaching physician.
Desires Mirena IUD PP      Anemia 2020     Hgb 9.4 ()      Susceptible to varicella (non-immune), currently pregnant 2020     Varicella PP      Hx of headaches 2020    Nabothian cysts 2020     Noted on pelvic exam at initial exam on 3/17/20      Hx of blood transfusion (2018) 2020     Due to GI bleed secondary to Motrin use      Family history of sickle cell trait (Pt negative) 2020     Maunt       Hx of GI bleed (2018) 2018     Due Motrin use. Pt received blood transfusion        Return in about 1 week (around 2020) for REAL. The patient was counseled on the mandatory call ahead policy. She has been instructed to call the office at anytime prior to going into the hospital. Exceptions were reviewed including but not limited to: decreased fetal movement, vaginal Bleeding or hemorrhage, trauma, readily expectant delivery, or any instance where she feels 911 should be utilized. The patient was counseled on Labor & Delivery. Route of delivery and counseling on vaginal, operative vaginal, and  sections were completed with the risks of each to both the patient as well as her baby.      Marilin Manrique DO  Ob/Gyn Resident  [facility], [facility]  2020, 11:30 AM

## 2020-08-28 ENCOUNTER — ROUTINE PRENATAL (OUTPATIENT)
Dept: OBGYN | Age: 25
End: 2020-08-28
Payer: COMMERCIAL

## 2020-08-28 VITALS
DIASTOLIC BLOOD PRESSURE: 87 MMHG | WEIGHT: 194 LBS | SYSTOLIC BLOOD PRESSURE: 137 MMHG | BODY MASS INDEX: 33.3 KG/M2 | HEART RATE: 100 BPM

## 2020-08-28 PROCEDURE — G8419 CALC BMI OUT NRM PARAM NOF/U: HCPCS | Performed by: STUDENT IN AN ORGANIZED HEALTH CARE EDUCATION/TRAINING PROGRAM

## 2020-08-28 PROCEDURE — 1036F TOBACCO NON-USER: CPT | Performed by: STUDENT IN AN ORGANIZED HEALTH CARE EDUCATION/TRAINING PROGRAM

## 2020-08-28 PROCEDURE — 99213 OFFICE O/P EST LOW 20 MIN: CPT | Performed by: STUDENT IN AN ORGANIZED HEALTH CARE EDUCATION/TRAINING PROGRAM

## 2020-08-28 PROCEDURE — G8427 DOCREV CUR MEDS BY ELIG CLIN: HCPCS | Performed by: STUDENT IN AN ORGANIZED HEALTH CARE EDUCATION/TRAINING PROGRAM

## 2020-08-28 NOTE — PROGRESS NOTES
Prenatal Visit    Sherwin Palma is a 21 y.o. female  at 37w2d    The patient was seen and evaluated. She has no complaints. There was positive fetal movements. She denies contractions, vaginal bleeding and leakage of fluid. Signs and symptoms of labor were reviewed. The S/S of Pre-Eclampsia were reviewed with the patient in detail. She is to report any of these if they occur. She currently denies any of these. The patient was instructed on fetal kick counts. She was instructed that the baby should move at a minimum of ten times within one hour after a meal. The patient was instructed to lay down on her left side twenty minutes after eating and count movements for up to one hour with a target value of ten movements. She was instructed to notify the office if she did not make that target after two attempts or if after any attempt there was less than four movements. The patient reports that the targets have been made. The patient already received the T-Dap Vaccine (27-36 weeks) this pregnancy on . The problem list reflects the active issues addressed during today's visit. Allergies: Allergies   Allergen Reactions    Motrin [Ibuprofen] Other (See Comments)     Pt experienced internal bleeding due this medication. Pt reports she \" took too much\"  Has tolerated this medication in  the past.        Vitals:  BP: 137/87  Weight: 194 lb (88 kg)  Pulse: 100  Patient Position: Sitting  Albumin: Negative  Glucose: Negative  Fundal Height (cm): 35 cm  Fetal Heart Rate: 155  Movement: Present     Labs:  Group Beta Strep collection was done. Sensitivities for clindamycin and erythromycin were not ordered.     Assessment & Plan:  Sherwin Palma is a 21 y.o. female  at 37w2d   - GBS testing was completed and negative   - RTC in one week for routine prenatal care and blood pressure monitoring       Patient Active Problem List    Diagnosis Date Noted    Elevated BP x 1 2020 labs

## 2020-08-30 NOTE — PROGRESS NOTES
Attending Physician Statement  I have discussed the care of [address], including pertinent history and exam findings,  with the resident. I have reviewed the key elements of all parts of the encounter with the resident. I agree with the assessment, plan and orders as documented by the resident.   (GE Modifier)

## 2020-09-02 ENCOUNTER — ANESTHESIA (OUTPATIENT)
Dept: LABOR AND DELIVERY | Age: 25
End: 2020-09-02
Payer: COMMERCIAL

## 2020-09-02 ENCOUNTER — ANESTHESIA EVENT (OUTPATIENT)
Dept: LABOR AND DELIVERY | Age: 25
End: 2020-09-02
Payer: COMMERCIAL

## 2020-09-02 ENCOUNTER — HOSPITAL ENCOUNTER (INPATIENT)
Age: 25
LOS: 3 days | Discharge: HOME | End: 2020-09-05
Attending: OBSTETRICS & GYNECOLOGY | Admitting: OBSTETRICS & GYNECOLOGY
Payer: COMMERCIAL

## 2020-09-02 VITALS — OXYGEN SATURATION: 100 % | SYSTOLIC BLOOD PRESSURE: 115 MMHG | DIASTOLIC BLOOD PRESSURE: 69 MMHG

## 2020-09-02 PROBLEM — O09.90 HRP (HIGH RISK PREGNANCY): Status: ACTIVE | Noted: 2020-09-02

## 2020-09-02 PROBLEM — Z98.890 POST-OPERATIVE STATE: Status: ACTIVE | Noted: 2020-09-02

## 2020-09-02 PROBLEM — Z3A.37 37 WEEKS GESTATION OF PREGNANCY: Status: ACTIVE | Noted: 2020-09-02

## 2020-09-02 LAB
-: ABNORMAL
ABO/RH: NORMAL
ABSOLUTE EOS #: 0.04 K/UL (ref 0–0.44)
ABSOLUTE IMMATURE GRANULOCYTE: 0.09 K/UL (ref 0–0.3)
ABSOLUTE LYMPH #: 1.68 K/UL (ref 1.1–3.7)
ABSOLUTE MONO #: 0.55 K/UL (ref 0.1–1.2)
AMORPHOUS: ABNORMAL
AMPHETAMINE SCREEN URINE: NEGATIVE
ANTIBODY SCREEN: NEGATIVE
ARM BAND NUMBER: NORMAL
BACTERIA: ABNORMAL
BARBITURATE SCREEN URINE: NEGATIVE
BASOPHILS # BLD: 0 % (ref 0–2)
BASOPHILS ABSOLUTE: 0.03 K/UL (ref 0–0.2)
BENZODIAZEPINE SCREEN, URINE: NEGATIVE
BILIRUBIN URINE: NEGATIVE
BUPRENORPHINE URINE: NORMAL
CANNABINOID SCREEN URINE: NEGATIVE
CASTS UA: ABNORMAL /LPF (ref 0–8)
COCAINE METABOLITE, URINE: NEGATIVE
COLOR: YELLOW
COMMENT UA: ABNORMAL
CRYSTALS, UA: ABNORMAL /HPF
DIFFERENTIAL TYPE: ABNORMAL
EOSINOPHILS RELATIVE PERCENT: 1 % (ref 1–4)
EPITHELIAL CELLS UA: ABNORMAL /HPF (ref 0–5)
EXPIRATION DATE: NORMAL
GLUCOSE URINE: NEGATIVE
HCT VFR BLD CALC: 32.7 % (ref 36.3–47.1)
HEMOGLOBIN: 10.5 G/DL (ref 11.9–15.1)
IMMATURE GRANULOCYTES: 1 %
KETONES, URINE: NEGATIVE
LEUKOCYTE ESTERASE, URINE: ABNORMAL
LYMPHOCYTES # BLD: 25 % (ref 24–43)
MCH RBC QN AUTO: 30.6 PG (ref 25.2–33.5)
MCHC RBC AUTO-ENTMCNC: 32.1 G/DL (ref 28.4–34.8)
MCV RBC AUTO: 95.3 FL (ref 82.6–102.9)
MDMA URINE: NORMAL
METHADONE SCREEN, URINE: NEGATIVE
METHAMPHETAMINE, URINE: NORMAL
MONOCYTES # BLD: 8 % (ref 3–12)
MUCUS: ABNORMAL
NITRITE, URINE: NEGATIVE
NRBC AUTOMATED: 0 PER 100 WBC
OPIATES, URINE: NEGATIVE
OTHER OBSERVATIONS UA: ABNORMAL
OXYCODONE SCREEN URINE: NEGATIVE
PDW BLD-RTO: 14.4 % (ref 11.8–14.4)
PH UA: 6.5 (ref 5–8)
PHENCYCLIDINE, URINE: NEGATIVE
PLATELET # BLD: 240 K/UL (ref 138–453)
PLATELET ESTIMATE: ABNORMAL
PMV BLD AUTO: 11.9 FL (ref 8.1–13.5)
PROPOXYPHENE, URINE: NORMAL
PROTEIN UA: NEGATIVE
RBC # BLD: 3.43 M/UL (ref 3.95–5.11)
RBC # BLD: ABNORMAL 10*6/UL
RBC UA: ABNORMAL /HPF (ref 0–4)
RENAL EPITHELIAL, UA: ABNORMAL /HPF
SARS-COV-2, PCR: NORMAL
SARS-COV-2, RAPID: NOT DETECTED
SARS-COV-2: NORMAL
SEG NEUTROPHILS: 65 % (ref 36–65)
SEGMENTED NEUTROPHILS ABSOLUTE COUNT: 4.37 K/UL (ref 1.5–8.1)
SOURCE: NORMAL
SOURCE: NORMAL
SPECIFIC GRAVITY UA: 1.01 (ref 1–1.03)
T. PALLIDUM, IGG: NONREACTIVE
TEST INFORMATION: NORMAL
TRICHOMONAS VAGINALI, MOLECULAR: NEGATIVE
TRICHOMONAS: ABNORMAL
TRICYCLIC ANTIDEPRESSANTS, UR: NORMAL
TURBIDITY: CLEAR
URINE HGB: NEGATIVE
UROBILINOGEN, URINE: NORMAL
WBC # BLD: 6.8 K/UL (ref 3.5–11.3)
WBC # BLD: ABNORMAL 10*3/UL
WBC UA: ABNORMAL /HPF (ref 0–5)
YEAST: ABNORMAL

## 2020-09-02 PROCEDURE — 3700000025 EPIDURAL BLOCK: Performed by: ANESTHESIOLOGY

## 2020-09-02 PROCEDURE — U0002 COVID-19 LAB TEST NON-CDC: HCPCS

## 2020-09-02 PROCEDURE — 85025 COMPLETE CBC W/AUTO DIFF WBC: CPT

## 2020-09-02 PROCEDURE — 6360000002 HC RX W HCPCS: Performed by: NURSE ANESTHETIST, CERTIFIED REGISTERED

## 2020-09-02 PROCEDURE — 87591 N.GONORRHOEAE DNA AMP PROB: CPT

## 2020-09-02 PROCEDURE — 86850 RBC ANTIBODY SCREEN: CPT

## 2020-09-02 PROCEDURE — 6360000002 HC RX W HCPCS: Performed by: STUDENT IN AN ORGANIZED HEALTH CARE EDUCATION/TRAINING PROGRAM

## 2020-09-02 PROCEDURE — 6370000000 HC RX 637 (ALT 250 FOR IP): Performed by: STUDENT IN AN ORGANIZED HEALTH CARE EDUCATION/TRAINING PROGRAM

## 2020-09-02 PROCEDURE — 80307 DRUG TEST PRSMV CHEM ANLYZR: CPT

## 2020-09-02 PROCEDURE — 2500000003 HC RX 250 WO HCPCS: Performed by: NURSE ANESTHETIST, CERTIFIED REGISTERED

## 2020-09-02 PROCEDURE — 86900 BLOOD TYPING SEROLOGIC ABO: CPT

## 2020-09-02 PROCEDURE — 88307 TISSUE EXAM BY PATHOLOGIST: CPT

## 2020-09-02 PROCEDURE — 87661 TRICHOMONAS VAGINALIS AMPLIF: CPT

## 2020-09-02 PROCEDURE — 2580000003 HC RX 258: Performed by: STUDENT IN AN ORGANIZED HEALTH CARE EDUCATION/TRAINING PROGRAM

## 2020-09-02 PROCEDURE — 36415 COLL VENOUS BLD VENIPUNCTURE: CPT

## 2020-09-02 PROCEDURE — 3609079900 HC CESAREAN SECTION: Performed by: OBSTETRICS & GYNECOLOGY

## 2020-09-02 PROCEDURE — 96372 THER/PROPH/DIAG INJ SC/IM: CPT

## 2020-09-02 PROCEDURE — 86780 TREPONEMA PALLIDUM: CPT

## 2020-09-02 PROCEDURE — 96374 THER/PROPH/DIAG INJ IV PUSH: CPT

## 2020-09-02 PROCEDURE — 81001 URINALYSIS AUTO W/SCOPE: CPT

## 2020-09-02 PROCEDURE — 87086 URINE CULTURE/COLONY COUNT: CPT

## 2020-09-02 PROCEDURE — 87491 CHLMYD TRACH DNA AMP PROBE: CPT

## 2020-09-02 PROCEDURE — 86901 BLOOD TYPING SEROLOGIC RH(D): CPT

## 2020-09-02 PROCEDURE — 76937 US GUIDE VASCULAR ACCESS: CPT

## 2020-09-02 PROCEDURE — 1220000000 HC SEMI PRIVATE OB R&B

## 2020-09-02 PROCEDURE — 6360000002 HC RX W HCPCS

## 2020-09-02 PROCEDURE — 7100000000 HC PACU RECOVERY - FIRST 15 MIN: Performed by: OBSTETRICS & GYNECOLOGY

## 2020-09-02 PROCEDURE — 2709999900 HC NON-CHARGEABLE SUPPLY: Performed by: OBSTETRICS & GYNECOLOGY

## 2020-09-02 PROCEDURE — 3700000001 HC ADD 15 MINUTES (ANESTHESIA): Performed by: OBSTETRICS & GYNECOLOGY

## 2020-09-02 PROCEDURE — 6360000002 HC RX W HCPCS: Performed by: ANESTHESIOLOGY

## 2020-09-02 PROCEDURE — 3700000000 HC ANESTHESIA ATTENDED CARE: Performed by: OBSTETRICS & GYNECOLOGY

## 2020-09-02 PROCEDURE — 7100000001 HC PACU RECOVERY - ADDTL 15 MIN: Performed by: OBSTETRICS & GYNECOLOGY

## 2020-09-02 RX ORDER — ACETAMINOPHEN 500 MG
1000 TABLET ORAL EVERY 6 HOURS PRN
Status: DISCONTINUED | OUTPATIENT
Start: 2020-09-02 | End: 2020-09-02 | Stop reason: SDUPTHER

## 2020-09-02 RX ORDER — MORPHINE SULFATE 1 MG/ML
INJECTION, SOLUTION EPIDURAL; INTRATHECAL; INTRAVENOUS PRN
Status: DISCONTINUED | OUTPATIENT
Start: 2020-09-02 | End: 2020-09-02 | Stop reason: SDUPTHER

## 2020-09-02 RX ORDER — KETOROLAC TROMETHAMINE 30 MG/ML
INJECTION, SOLUTION INTRAMUSCULAR; INTRAVENOUS
Status: COMPLETED
Start: 2020-09-02 | End: 2020-09-02

## 2020-09-02 RX ORDER — ROPIVACAINE HYDROCHLORIDE 2 MG/ML
INJECTION, SOLUTION EPIDURAL; INFILTRATION; PERINEURAL PRN
Status: DISCONTINUED | OUTPATIENT
Start: 2020-09-02 | End: 2020-09-02 | Stop reason: SDUPTHER

## 2020-09-02 RX ORDER — NALOXONE HYDROCHLORIDE 0.4 MG/ML
0.4 INJECTION, SOLUTION INTRAMUSCULAR; INTRAVENOUS; SUBCUTANEOUS PRN
Status: DISCONTINUED | OUTPATIENT
Start: 2020-09-02 | End: 2020-09-02

## 2020-09-02 RX ORDER — ROPIVACAINE HYDROCHLORIDE 2 MG/ML
INJECTION, SOLUTION EPIDURAL; INFILTRATION; PERINEURAL
Status: COMPLETED
Start: 2020-09-02 | End: 2020-09-02

## 2020-09-02 RX ORDER — SIMETHICONE 80 MG
80 TABLET,CHEWABLE ORAL EVERY 6 HOURS PRN
Status: DISCONTINUED | OUTPATIENT
Start: 2020-09-02 | End: 2020-09-05 | Stop reason: HOSPADM

## 2020-09-02 RX ORDER — ACETAMINOPHEN 500 MG
1000 TABLET ORAL EVERY 6 HOURS PRN
Status: DISCONTINUED | OUTPATIENT
Start: 2020-09-03 | End: 2020-09-05 | Stop reason: HOSPADM

## 2020-09-02 RX ORDER — OXYCODONE HYDROCHLORIDE AND ACETAMINOPHEN 5; 325 MG/1; MG/1
2 TABLET ORAL EVERY 4 HOURS PRN
Status: DISCONTINUED | OUTPATIENT
Start: 2020-09-03 | End: 2020-09-05 | Stop reason: HOSPADM

## 2020-09-02 RX ORDER — SODIUM CHLORIDE 0.9 % (FLUSH) 0.9 %
10 SYRINGE (ML) INJECTION PRN
Status: DISCONTINUED | OUTPATIENT
Start: 2020-09-02 | End: 2020-09-05 | Stop reason: HOSPADM

## 2020-09-02 RX ORDER — SODIUM CHLORIDE, SODIUM LACTATE, POTASSIUM CHLORIDE, CALCIUM CHLORIDE 600; 310; 30; 20 MG/100ML; MG/100ML; MG/100ML; MG/100ML
INJECTION, SOLUTION INTRAVENOUS CONTINUOUS
Status: DISCONTINUED | OUTPATIENT
Start: 2020-09-02 | End: 2020-09-03

## 2020-09-02 RX ORDER — LIDOCAINE HYDROCHLORIDE 10 MG/ML
30 INJECTION, SOLUTION EPIDURAL; INFILTRATION; INTRACAUDAL; PERINEURAL PRN
Status: DISCONTINUED | OUTPATIENT
Start: 2020-09-02 | End: 2020-09-02

## 2020-09-02 RX ORDER — KETOROLAC TROMETHAMINE 30 MG/ML
30 INJECTION, SOLUTION INTRAMUSCULAR; INTRAVENOUS EVERY 6 HOURS
Status: COMPLETED | OUTPATIENT
Start: 2020-09-02 | End: 2020-09-03

## 2020-09-02 RX ORDER — LIDOCAINE HYDROCHLORIDE 10 MG/ML
INJECTION, SOLUTION EPIDURAL; INFILTRATION; INTRACAUDAL; PERINEURAL PRN
Status: DISCONTINUED | OUTPATIENT
Start: 2020-09-02 | End: 2020-09-02 | Stop reason: SDUPTHER

## 2020-09-02 RX ORDER — NALBUPHINE HCL 10 MG/ML
5 AMPUL (ML) INJECTION EVERY 4 HOURS PRN
Status: DISCONTINUED | OUTPATIENT
Start: 2020-09-02 | End: 2020-09-02

## 2020-09-02 RX ORDER — FENTANYL CITRATE 50 UG/ML
INJECTION, SOLUTION INTRAMUSCULAR; INTRAVENOUS PRN
Status: DISCONTINUED | OUTPATIENT
Start: 2020-09-02 | End: 2020-09-02 | Stop reason: SDUPTHER

## 2020-09-02 RX ORDER — NALOXONE HYDROCHLORIDE 0.4 MG/ML
0.4 INJECTION, SOLUTION INTRAMUSCULAR; INTRAVENOUS; SUBCUTANEOUS PRN
Status: DISCONTINUED | OUTPATIENT
Start: 2020-09-02 | End: 2020-09-05 | Stop reason: HOSPADM

## 2020-09-02 RX ORDER — ONDANSETRON 4 MG/1
4 TABLET, ORALLY DISINTEGRATING ORAL ONCE
Status: COMPLETED | OUTPATIENT
Start: 2020-09-02 | End: 2020-09-02

## 2020-09-02 RX ORDER — DOCUSATE SODIUM 100 MG/1
100 CAPSULE, LIQUID FILLED ORAL 2 TIMES DAILY
Status: DISCONTINUED | OUTPATIENT
Start: 2020-09-02 | End: 2020-09-05 | Stop reason: HOSPADM

## 2020-09-02 RX ORDER — MIDAZOLAM HYDROCHLORIDE 1 MG/ML
INJECTION INTRAMUSCULAR; INTRAVENOUS PRN
Status: DISCONTINUED | OUTPATIENT
Start: 2020-09-02 | End: 2020-09-02 | Stop reason: SDUPTHER

## 2020-09-02 RX ORDER — SODIUM CHLORIDE 0.9 % (FLUSH) 0.9 %
10 SYRINGE (ML) INJECTION PRN
Status: DISCONTINUED | OUTPATIENT
Start: 2020-09-02 | End: 2020-09-02

## 2020-09-02 RX ORDER — POLYETHYLENE GLYCOL 3350 17 G/17G
17 POWDER, FOR SOLUTION ORAL DAILY PRN
Status: DISCONTINUED | OUTPATIENT
Start: 2020-09-02 | End: 2020-09-05 | Stop reason: HOSPADM

## 2020-09-02 RX ORDER — LANOLIN 72 %
OINTMENT (GRAM) TOPICAL
Status: DISCONTINUED | OUTPATIENT
Start: 2020-09-02 | End: 2020-09-05 | Stop reason: HOSPADM

## 2020-09-02 RX ORDER — MORPHINE SULFATE 10 MG/ML
10 INJECTION, SOLUTION INTRAMUSCULAR; INTRAVENOUS ONCE
Status: COMPLETED | OUTPATIENT
Start: 2020-09-02 | End: 2020-09-02

## 2020-09-02 RX ORDER — VITAMIN A, ASCORBIC ACID, CHOLECALCIFEROL, .ALPHA.-TOCOPHEROL ACETATE, DL-, THIAMINE MONONITRATE, RIBOFLAVIN, NIACINAMIDE, PYRIDOXINE HYDROCHLORIDE, FOLIC ACID, CYANOCOBALAMIN, CALCIUM CARBONATE, IRON, ZINC OXIDE, AND CUPRIC OXIDE 4000; 120; 400; 22; 1.84; 3; 20; 10; 1; 12; 200; 29; 25; 2 [IU]/1; MG/1; [IU]/1; [IU]/1; MG/1; MG/1; MG/1; MG/1; MG/1; UG/1; MG/1; MG/1; MG/1; MG/1
1 TABLET ORAL DAILY
Status: DISCONTINUED | OUTPATIENT
Start: 2020-09-02 | End: 2020-09-05 | Stop reason: HOSPADM

## 2020-09-02 RX ORDER — CHLOROPROCAINE HYDROCHLORIDE 30 MG/ML
INJECTION, SOLUTION EPIDURAL; INFILTRATION; INTRACAUDAL; PERINEURAL PRN
Status: DISCONTINUED | OUTPATIENT
Start: 2020-09-02 | End: 2020-09-02 | Stop reason: SDUPTHER

## 2020-09-02 RX ORDER — DIPHENHYDRAMINE HCL 25 MG
25 TABLET ORAL EVERY 4 HOURS PRN
Status: DISCONTINUED | OUTPATIENT
Start: 2020-09-02 | End: 2020-09-02

## 2020-09-02 RX ORDER — SODIUM CHLORIDE, SODIUM LACTATE, POTASSIUM CHLORIDE, CALCIUM CHLORIDE 600; 310; 30; 20 MG/100ML; MG/100ML; MG/100ML; MG/100ML
INJECTION, SOLUTION INTRAVENOUS CONTINUOUS
Status: DISCONTINUED | OUTPATIENT
Start: 2020-09-02 | End: 2020-09-02

## 2020-09-02 RX ORDER — ONDANSETRON 2 MG/ML
4 INJECTION INTRAMUSCULAR; INTRAVENOUS EVERY 6 HOURS PRN
Status: DISCONTINUED | OUTPATIENT
Start: 2020-09-02 | End: 2020-09-02

## 2020-09-02 RX ORDER — LIDOCAINE HYDROCHLORIDE AND EPINEPHRINE 15; 5 MG/ML; UG/ML
INJECTION, SOLUTION EPIDURAL PRN
Status: DISCONTINUED | OUTPATIENT
Start: 2020-09-02 | End: 2020-09-02 | Stop reason: SDUPTHER

## 2020-09-02 RX ORDER — BISACODYL 10 MG
10 SUPPOSITORY, RECTAL RECTAL DAILY PRN
Status: DISCONTINUED | OUTPATIENT
Start: 2020-09-02 | End: 2020-09-05 | Stop reason: HOSPADM

## 2020-09-02 RX ORDER — ONDANSETRON 2 MG/ML
4 INJECTION INTRAMUSCULAR; INTRAVENOUS EVERY 6 HOURS PRN
Status: DISCONTINUED | OUTPATIENT
Start: 2020-09-02 | End: 2020-09-05 | Stop reason: HOSPADM

## 2020-09-02 RX ORDER — OXYCODONE HYDROCHLORIDE AND ACETAMINOPHEN 5; 325 MG/1; MG/1
1 TABLET ORAL EVERY 4 HOURS PRN
Status: DISCONTINUED | OUTPATIENT
Start: 2020-09-03 | End: 2020-09-05 | Stop reason: HOSPADM

## 2020-09-02 RX ORDER — DIPHENHYDRAMINE HYDROCHLORIDE 50 MG/ML
25 INJECTION INTRAMUSCULAR; INTRAVENOUS EVERY 6 HOURS PRN
Status: DISCONTINUED | OUTPATIENT
Start: 2020-09-02 | End: 2020-09-05 | Stop reason: HOSPADM

## 2020-09-02 RX ORDER — CEPHALEXIN 500 MG/1
500 CAPSULE ORAL EVERY 8 HOURS SCHEDULED
Status: COMPLETED | OUTPATIENT
Start: 2020-09-03 | End: 2020-09-04

## 2020-09-02 RX ORDER — PROMETHAZINE HYDROCHLORIDE 25 MG/ML
25 INJECTION, SOLUTION INTRAMUSCULAR; INTRAVENOUS ONCE
Status: COMPLETED | OUTPATIENT
Start: 2020-09-02 | End: 2020-09-02

## 2020-09-02 RX ORDER — METRONIDAZOLE 500 MG/1
500 TABLET ORAL EVERY 8 HOURS SCHEDULED
Status: COMPLETED | OUTPATIENT
Start: 2020-09-03 | End: 2020-09-04

## 2020-09-02 RX ORDER — ACETAMINOPHEN 500 MG
1000 TABLET ORAL EVERY 6 HOURS PRN
Status: DISCONTINUED | OUTPATIENT
Start: 2020-09-02 | End: 2020-09-02

## 2020-09-02 RX ADMIN — MIDAZOLAM HYDROCHLORIDE 1 MG: 1 INJECTION, SOLUTION INTRAMUSCULAR; INTRAVENOUS at 10:54

## 2020-09-02 RX ADMIN — MORPHINE SULFATE 10 MG: 10 INJECTION INTRAVENOUS at 08:19

## 2020-09-02 RX ADMIN — Medication 10 ML/HR: at 10:19

## 2020-09-02 RX ADMIN — CHLOROPROCAINE HYDROCHLORIDE 10 ML: 30 INJECTION, SOLUTION EPIDURAL; INFILTRATION; INTRACAUDAL; PERINEURAL at 10:45

## 2020-09-02 RX ADMIN — CEFAZOLIN 2 G: 10 INJECTION, POWDER, FOR SOLUTION INTRAVENOUS at 10:48

## 2020-09-02 RX ADMIN — PROMETHAZINE HYDROCHLORIDE 25 MG: 25 INJECTION INTRAMUSCULAR; INTRAVENOUS at 08:18

## 2020-09-02 RX ADMIN — DOCUSATE SODIUM 100 MG: 100 CAPSULE, LIQUID FILLED ORAL at 20:01

## 2020-09-02 RX ADMIN — KETOROLAC TROMETHAMINE 30 MG: 30 INJECTION, SOLUTION INTRAMUSCULAR at 18:01

## 2020-09-02 RX ADMIN — MORPHINE SULFATE 2 MG: 1 INJECTION, SOLUTION EPIDURAL; INTRATHECAL; INTRAVENOUS at 11:24

## 2020-09-02 RX ADMIN — SODIUM CHLORIDE, POTASSIUM CHLORIDE, SODIUM LACTATE AND CALCIUM CHLORIDE: 600; 310; 30; 20 INJECTION, SOLUTION INTRAVENOUS at 12:06

## 2020-09-02 RX ADMIN — ONDANSETRON 4 MG: 4 TABLET, ORALLY DISINTEGRATING ORAL at 08:30

## 2020-09-02 RX ADMIN — FENTANYL CITRATE 100 MCG: 50 INJECTION INTRAMUSCULAR; INTRAVENOUS at 10:54

## 2020-09-02 RX ADMIN — ROPIVACAINE HYDROCHLORIDE 5 ML: 2 INJECTION, SOLUTION EPIDURAL; INFILTRATION at 10:18

## 2020-09-02 RX ADMIN — KETOROLAC TROMETHAMINE 30 MG: 30 INJECTION, SOLUTION INTRAMUSCULAR; INTRAVENOUS at 12:25

## 2020-09-02 RX ADMIN — LIDOCAINE HYDROCHLORIDE,EPINEPHRINE BITARTRATE 3 ML: 15; .005 INJECTION, SOLUTION EPIDURAL; INFILTRATION; INTRACAUDAL; PERINEURAL at 10:08

## 2020-09-02 RX ADMIN — KETOROLAC TROMETHAMINE 30 MG: 30 INJECTION, SOLUTION INTRAMUSCULAR at 12:25

## 2020-09-02 RX ADMIN — Medication 500 MG: at 10:53

## 2020-09-02 RX ADMIN — SODIUM CHLORIDE, POTASSIUM CHLORIDE, SODIUM LACTATE AND CALCIUM CHLORIDE: 600; 310; 30; 20 INJECTION, SOLUTION INTRAVENOUS at 20:05

## 2020-09-02 RX ADMIN — CHLOROPROCAINE HYDROCHLORIDE 5 ML: 30 INJECTION, SOLUTION EPIDURAL; INFILTRATION; INTRACAUDAL; PERINEURAL at 10:48

## 2020-09-02 RX ADMIN — SODIUM CHLORIDE, POTASSIUM CHLORIDE, SODIUM LACTATE AND CALCIUM CHLORIDE: 600; 310; 30; 20 INJECTION, SOLUTION INTRAVENOUS at 09:21

## 2020-09-02 RX ADMIN — Medication 170 ML: at 10:52

## 2020-09-02 RX ADMIN — LIDOCAINE HYDROCHLORIDE 3 ML: 10 INJECTION, SOLUTION EPIDURAL; INFILTRATION; INTRACAUDAL; PERINEURAL at 10:03

## 2020-09-02 RX ADMIN — CHLOROPROCAINE HYDROCHLORIDE 5 ML: 30 INJECTION, SOLUTION EPIDURAL; INFILTRATION; INTRACAUDAL; PERINEURAL at 11:24

## 2020-09-02 RX ADMIN — ROPIVACAINE HYDROCHLORIDE 5 ML: 2 INJECTION, SOLUTION EPIDURAL; INFILTRATION at 10:13

## 2020-09-02 ASSESSMENT — PULMONARY FUNCTION TESTS
PIF_VALUE: 0
PIF_VALUE: 0
PIF_VALUE: -14
PIF_VALUE: 0
PIF_VALUE: -14
PIF_VALUE: 0
PIF_VALUE: 0
PIF_VALUE: 1
PIF_VALUE: 0
PIF_VALUE: 1
PIF_VALUE: 0
PIF_VALUE: 1
PIF_VALUE: 0
PIF_VALUE: -14
PIF_VALUE: 0
PIF_VALUE: 1
PIF_VALUE: 0

## 2020-09-02 ASSESSMENT — PAIN SCALES - GENERAL
PAINLEVEL_OUTOF10: 10
PAINLEVEL_OUTOF10: 1
PAINLEVEL_OUTOF10: 2

## 2020-09-02 ASSESSMENT — LIFESTYLE VARIABLES: SMOKING_STATUS: 0

## 2020-09-02 NOTE — ANESTHESIA POSTPROCEDURE EVALUATION
Department of Anesthesiology  Postprocedure Note    Patient: Alec Pierre  MRN: [id number]  YOB: 1995  Date of evaluation: 2020  Time:  3:02 PM     Procedure Summary     Date:  20 Room / Location:  [facility]    Anesthesia Start:  5713 Anesthesia Stop:  2273    Procedures:        SECTION (N/A Abdomen)      Labor Analgesia Diagnosis:      Surgeon:  Karolina Morse DO Responsible Provider:  Gladys Mullins MD    Anesthesia Type:  epidural ASA Status:  2 - Emergent          Anesthesia Type: No value filed. London Phase I: London Score: 10    London Phase II: London Score: 10    Last vitals: Reviewed and per EMR flowsheets.    POST-OP ANESTHESIA NOTE       /78   Pulse 79   Temp 98 °F (36.7 °C) (Oral)   Resp 19   Ht 5' 4\" (1.626 m)   Wt 194 lb (88 kg)   LMP 2019 (Exact Date)   SpO2 99%   BMI 33.30 kg/m²       Pain Level: 2         Anesthesia Post Evaluation    Patient location during evaluation: PACU  Patient participation: complete - patient participated  Level of consciousness: awake  Pain score: 2  Airway patency: patent  Nausea & Vomiting: no vomiting and no nausea  Complications: no  Cardiovascular status: hemodynamically stable  Respiratory status: acceptable  Hydration status: stable

## 2020-09-02 NOTE — H&P
[facility]    Date: 2020       Time: 4:00 AM   Patient Name: Rock Lau     Patient : 1995  Room/Bed: [place] [address]    Admission Date/Time: 2020  3:47 AM      CC: Abdominal pain/Cramping     HPI: Rock Lau is a 21 y.o.  at 37w2d who presents c/o intermittent cramping beginning x3 hours ago. The patient states the cramping is intermittent, coming and going every ten minutes and about 1 minute in duration. She states she thinks they feel like contractions but is unsure because she has never felt them before. She denies recent intercourse and states she has been adequately hydrating. She denies vaginal discharge. The patient reports fetal movement is present, complains of contractions, denies loss of fluid, denies vaginal bleeding. Of note, the patient had and elevated BP in the office on . PreE labs were drawn at that time and found to be wnl. Patient denies any headache, visual changes, difficulty breathing, RUQ pain, N/V, F/C, and pain/swelling in lower extremities    The patient has a Hx of a GI bleed 2/2 to motrin use in 2018. Patient states she was not using above the recommended dosing. She required a 2U PRBCs at that time. Patient not longer takes motrin. Pregnancy is also complicated by Hx Headaches controlled with tylenol PRN, Anemia for which she takes iron supplementation, FHx sickle cell, and BMI 33. DATING:  LMP: Patient's last menstrual period was 2019 (exact date).   Estimated Date of Delivery: 20   Based on: LMP confirmed by early ultrasound, at 5 6/7 weeks GA    PREGNANCY RISK FACTORS:  Patient Active Problem List   Diagnosis    Hx of GI bleed (2018)    Hx of blood transfusion (2018)    Family history of sickle cell trait (Pt negative)    Hx of headaches    Nabothian cysts    Susceptible to varicella (non-immune), currently pregnant    Anemia    Dysmenorrhea    Elevated BP x 1 with any prenatal vit her insurance will cover. 3/2/20 3/2/21  Celsa Harris MD   pyridoxine (RA VITAMIN B-6) 50 MG tablet Take 0.5 tablets by mouth three times daily  Patient not taking: Reported on 8/28/2020 3/2/20 3/2/21  Celsa Harris MD       FAMILY HISTORY:  family history includes No Known Problems in her brother, father, maternal grandfather, mother, paternal grandfather, paternal grandmother, and sister; Other in her maternal grandmother. SOCIAL HISTORY:   reports that she has never smoked. She has never used smokeless tobacco. She reports that she does not drink alcohol or use drugs. VITALS:  Vitals:    09/02/20 0354 09/02/20 0430   BP: 131/87    Pulse: 67    Resp: 18    Temp: 98.7 °F (37.1 °C)    TempSrc: Oral    Weight:  194 lb (88 kg)   Height:  5' 4\" (1.626 m)       PHYSICAL EXAM:  Fetal Heart Monitor:  Baseline Heart Rate 140, moderate variability, present accelerations, late deceleration x1 and period of minimal variability on arrival resolved. [place]: contractions, regular, every 7-9 minutes    General appearance:  no apparent distress, alert and cooperative  Neurologic:  alert, oriented, normal speech, no focal findings or movement disorder noted  Lungs:  No increased work of breathing, good air exchange, clear to auscultation bilaterally, no crackles or wheezing  Heart:  regular rate and rhythm and no murmur    Abdomen:  soft, gravid, non-tender, no right upper quadrant tenderness, no CVA tenderness, uterus non-tender, no signs of abruption and no signs of chorioamnionitis  Extremities:  no calf tenderness, non edematous, DTRs: normal    Pelvic Exam:    Vulva: normal appearing vulva, no masses, tenderness or lesions, normal clitoris   Vagina: Normal appearing vagina with normal color and discharge, no lesions. Mild discharge noted in the vaginal vault. Cervix: cervix appears visually dilated approximately 2cm. No bleeding or lesions.   Uterus: is gravid, normal size, shape, consistency and non-tender   Adnexa: non-tender, no palpable masses   Rectal Exam: not indicated     Speculum: Cervix visually dilated approximately 2cm. No bleeding or lesions noted. Vaginal discharge in the vault noted. Cervix Check: 3 cm dilated, 30 % effaced, -2 station, anterior position, soft consistency, Cephalic (confirmed by ultrasound)   Bishops Score: 7         0 1 2 3   Position Posterior Intermediate Anterior -   Consistency Firm Intermediate Soft -   Effacement 0-30% 31-50% 51-80% >80%   Dilation 0cm 1-2cm 3-4cm >5cm   Fetal Station -3 -2 -1, 0 +1, +2          DATA:  Wet prep: Clue cells Absent , Trichomonas Absent   KOH:  Yeast or Hyphae Absent   Whiff Test: negative     LIMITED BEDSIDE US:  Position: Cephalic  Placental Location: anterior  Fetal Heart Tones: Present  Fetal Movement: Present  Amniotic Fluid Index/Volume: MVP 3cm  Estimated Fetal Weight:  6 lbs 3oz    Biophysical Profile:   Amniotic Fluid Index: MVP 3cm  Tone: Present  Movement: Present  Breathing: Present  Biophysical Score: 8 / 8  Fetal Position: Cephalic    PRENATAL LAB RESULTS:   Blood Type/Rh: A pos  Antibody Screen: negative  Hemoglobin, Hematocrit, Platelets: 44.3 / 28.8 / 313  Rubella: immune  T.  Pallidum, IgG: non-reactive   Hepatitis B Surface Antigen: non-reactive   HIV: non-reactive   Sickle Cell Screen: negative  Gonorrhea: negative  Chlamydia: negative  Urine culture: negative, date: 3/4/20    1 hour Glucose Tolerance Test: 108  3 hour Glucose Tolerance Test: not done    Group B Strep: negative  Cystic Fibrosis Screen: negative  First Trimester Screen: low risk  MSAFP/Multiple Markers: normal  Non-Invasive Prenatal Testing: not done  Anatomy US: Anterior, 3VC, Female    ASSESSMENT & PLAN:  Minor Harrison is a 21 y.o. female  at 37w2d IUP   - GBS negative / Rh positive / R immune   - No indication for GBS prophylaxis   - VSS, Afebrile    IOL 2/2 Cat II Tracing    - Admit to L&D SREEDHAR Dr. Theador Dubin   - SSE: Cervix visually dilated approximately 2cm. No bleeding or lesions noted. Vaginal discharge in the vault noted. - SVE: 3, 30, -2    - 4, 50, -2 on recheck, changed from prior.   - Wet prep negative   - GC/c pending   - UA not suspicious for UTI    - BPP: 8/8, PUSHPA borderline @ 6cm. MVP 3cm   - CEFM/TOCO: Cat 2 2/2 late deceleration, q7-9   - CBC, T&S, T. Pall ordered   - COVID pending   - UDS ordered Informed consent obtained. All questions and concerns answered. Patient verbalized understanding and agreement to plan. - IV fluid - LR @125cc/hr   - Continue expectant management    Abdominal Pain/Cramping     - Contractions q7-9 on TOCO   - Admit to L&D     Elevated BP x1    - PreE labs wnl x1 (8/21)   - Denies s/s PreE   - BP normotensive on admission     Hx GI Bleed (2018)   - 2/2 Ibuprofen usage for menstraul cramping   - Required 2Us PRBCs @ [facility]    - Patient does not take motrin    Hx Headaches    - Relieved w/ Tylenol PRN    Anemia    - Hgb 9.8 on 8/21/20   - Patient takes iron supplementation    FHx Sickle cell trait    - Pt negative   - Maternal aunt     Dysmenorrhea   - Desires Mirena IUD PP     BMI 33      Patient Active Problem List    Diagnosis Date Noted    Elevated BP x 1 08/21/2020 8/21 labs ordered      Dysmenorrhea 08/11/2020     Desires Mirena IUD PP      Anemia 06/27/2020     Hgb 9.4 (6/26)      Susceptible to varicella (non-immune), currently pregnant 03/18/2020     Varicella PP      Hx of headaches 03/17/2020    Nabothian cysts 03/17/2020     Noted on pelvic exam at initial exam on 3/17/20      Hx of blood transfusion (2018) 03/02/2020     Due to GI bleed secondary to Motrin use      Family history of sickle cell trait (Pt negative) 03/02/2020     Maunt       Hx of GI bleed (2018) 04/16/2018     Due Motrin use. Pt received blood transfusion          Plan discussed with Dr. Eduard Barragan, who is agreeable.      Steroids given this admission: No    Risks, benefits, alternatives and possible complications have been discussed in detail with the patient. Admission, and post admission procedures and expectations were discussed in detail. All questions were answered.     Attending's Name: Dr. Kinsey Prakash, [place]  Ob/Gyn Resident  9/2/2020, 4:00 AM

## 2020-09-02 NOTE — BRIEF OP NOTE
Department of Obstetrics and Gynecology  Obstetrical Brief Operative Report  [id number]     Patient: Leticia Aguilar   : 1995  MRN: [id number]       Acct: [de-identified]   Date of Procedure: 20    Pre-operative Diagnosis: 21 y.o. female  at 37w2d    Terminal Bradycardia    Spontaneous Labor    Hx GI Bleed   Anemia     Post-operative Diagnosis: Living  infant, same as above, suspected 10% placental abruption     Procedure: primary low transverse  section    Surgeon: Dr Maree Sandhoff   Assistant(s): Mana Gillis PGY3    Anesthesia: epidural with [facility] for the patient's :  Ozell Justen Girl Blaze Gee [[id number]]   female   Birth Weight: N/A      Findings:  Live Born female infant in cephalic presentation with Apgars of 1 at 1 minute and 5 min Apgar pending due to NICU admission,   normal appearing uterus tubes and ovaries   Estimated Blood Loss: 300mL, QBL pending  Total IV Fluids: 800 mL  Urine output: 150 mL clear urine   Drains:  chang catheter  Specimens:  placenta sent to pathology, cord blood and cord gases  Instrument and Sponge Count: Correct  Complications: none  Condition: Infant stable, transfer to NICU, Mother stable, transfer to post anesthesia recovery    See dictated operative report for full details.        Jovani Perez DO  Ob/Gyn Resident  2020, 11:39 AM

## 2020-09-02 NOTE — ANESTHESIA PRE PROCEDURE
Department of Anesthesiology  Preprocedure Note       Name:  Magda Vasquez   Age:  21 y.o.  :  1995                                          MRN:  [id number]         Date:  2020      Surgeon: Carl Weaver):  Dale Medley DO    Procedure: Procedure(s):   SECTION    Medications prior to admission:   Prior to Admission medications    Medication Sig Start Date End Date Taking? Authorizing Provider   ferrous sulfate (FE TABS) 325 (65 Fe) MG EC tablet Take 1 tablet by mouth 2 times daily 20   Cinda Loza DO   acetaminophen (TYLENOL) 325 MG tablet Take 2 tablets by mouth every 6 hours as needed for Pain 5/15/20   True Seek, APRN - CNP   Prenatal Multivit-Min-Fe-FA (PRENATAL FORTE) TABS Take 1 tablet by mouth Daily May substitute with any prenatal vit her insurance will cover. 3/2/20 3/2/21  Trina Brian MD   pyridoxine (RA VITAMIN B-6) 50 MG tablet Take 0.5 tablets by mouth three times daily  Patient not taking: Reported on 2020 3/2/20 3/2/21  Trina Brian MD       Current medications:    No current facility-administered medications for this visit. No current outpatient medications on file.      Facility-Administered Medications Ordered in Other Visits   Medication Dose Route Frequency Provider Last Rate Last Dose    lactated ringers infusion   Intravenous Continuous Cristóbal Almaguer  mL/hr at 20 0921      sodium chloride flush 0.9 % injection 10 mL  10 mL Intravenous PRN Cristóbal Almaguer DO        lidocaine PF 1 % injection 30 mL  30 mL Other PRN Cristóbal Almaguer DO        acetaminophen (TYLENOL) tablet 1,000 mg  1,000 mg Oral Q6H PRN Cristóbal Almaguer DO        diphenhydrAMINE (BENADRYL) tablet 25 mg  25 mg Oral Q4H PRN Cristóbal Almaguer DO        ondansetron (ZOFRAN) injection 4 mg  4 mg Intravenous Q6H PRN Rena Aguilar DO        benzocaine-menthol (DERMOPLAST) 20-0.5 % spray   Topical PRN Cristóbal Almaguer DO        levonorgestrel (MIRENA) IUD 52 mg 1 each  1 each Intrauterine Once Marta Sood DO        naloxone [facility]) injection 0.4 mg  0.4 mg Intravenous PRN Emily Zavala MD        nalbuphine (NUBAIN) injection 5 mg  5 mg Intravenous Q4H PRN Emily Zavala MD        ondansetron (ZOFRAN) injection 4 mg  4 mg Intravenous Q6H PRN Emily Zavala MD        ropivacaine 0.2% in sodium chloride 0.9% (OB) epidural 100 mL  10 mL/hr Epidural Continuous Emily Zavala MD   Stopped at 09/02/20 1043    ceFAZolin (ANCEF) 2 g in dextrose 5 % 50 mL IVPB  2 g Intravenous Q8H Citlaly Camardo Grant, DO   2 g at 09/02/20 1048    azithromycin (ZITHROMAX) 500 mg in dextrose 5% 250 mL IVPB  500 mg Intravenous Q24H Citlaly Camardo Grant, DO   500 mg at 09/02/20 1053       Allergies: Allergies   Allergen Reactions    Motrin [Ibuprofen] Other (See Comments)     Pt experienced internal bleeding due this medication.  Pt reports she \" took too much\"  Has tolerated this medication in  the past.        Problem List:    Patient Active Problem List   Diagnosis Code    Hx of GI bleed (2018) Z87.19    Hx of blood transfusion (2018) Z92.89    Family history of sickle cell trait (Pt negative) Z83.2    Hx of headaches Z87.898    Nabothian cysts N88.8    Susceptible to varicella (non-immune), currently pregnant O09.899, Z28.3    Anemia D64.9    Dysmenorrhea N94.6    Elevated BP x 1 O16.3    37 weeks gestation of pregnancy Z3A.37    HRP (high risk pregnancy) O09.90       Past Medical History:        Diagnosis Date    History of blood transfusion 2018    From Motrin use     Menometrorrhagia 5/10/2018    Symptomatic anemia 4/17/2018    From Motrin use     Vision abnormalities        Past Surgical History:        Procedure Laterality Date    COLONOSCOPY  06/01/2018    No lesions seen    [place] COLON CA SCRN NOT HI RSK IND N/A 6/1/2018    COLONOSCOPY performed by Celso Montana MD at [address]  04/18/2018    No lesions seen up to the 2nd part of the duodenum.  UPPER GASTROINTESTINAL ENDOSCOPY N/A 4/18/2018    EGD DIAGNOSTIC ONLY performed by Susan Albarran MD at [address] History:    Social History     Tobacco Use    Smoking status: Never Smoker    Smokeless tobacco: Never Used   Substance Use Topics    Alcohol use: No                                Counseling given: Not Answered      Vital Signs (Current): There were no vitals filed for this visit. BP Readings from Last 3 Encounters:   09/02/20 130/87   09/02/20 115/69   08/28/20 137/87       NPO Status:                                                                                 BMI:   Wt Readings from Last 3 Encounters:   09/02/20 194 lb (88 kg)   08/28/20 194 lb (88 kg)   08/21/20 193 lb (87.5 kg)     There is no height or weight on file to calculate BMI.    CBC:   Lab Results   Component Value Date    WBC 6.8 09/02/2020    RBC 3.43 09/02/2020    HGB 10.5 09/02/2020    HCT 32.7 09/02/2020    MCV 95.3 09/02/2020    RDW 14.4 09/02/2020     09/02/2020       CMP:   Lab Results   Component Value Date     08/21/2020    K 3.6 08/21/2020     08/21/2020    CO2 18 08/21/2020    BUN 6 08/21/2020    CREATININE 0.43 08/21/2020    GFRAA >60 08/21/2020    LABGLOM >60 08/21/2020    GLUCOSE 111 08/21/2020    PROT 6.8 08/21/2020    CALCIUM 9.3 08/21/2020    BILITOT 0.17 08/21/2020    ALKPHOS 63 08/21/2020    AST 16 08/21/2020    ALT 10 08/21/2020       POC Tests: No results for input(s): POCGLU, POCNA, POCK, POCCL, POCBUN, POCHEMO, POCHCT in the last 72 hours.     Coags:   Lab Results   Component Value Date    PROTIME 10.7 04/17/2018    INR 1.0 04/17/2018    APTT 23.6 04/16/2018       HCG (If Applicable):   Lab Results   Component Value Date    PREGTESTUR positive 02/03/2020    HCG POSITIVE (A) 01/26/2020    HCGQUANT 15,831 (H) 01/26/2020        ABGs: No results found for: PHART, PO2ART, ILD6OPO, SPS7QMT, BEART, W7SDPVHZ     Type & Screen (If Applicable):  No results found for: LABABO, LABRH    Drug/Infectious Status (If Applicable):  Lab Results   Component Value Date    HEPCAB NONREACTIVE 12/12/2011       COVID-19 Screening (If Applicable):   Lab Results   Component Value Date    COVID19 Not Detected 09/02/2020         Anesthesia Evaluation   no history of anesthetic complications:   Airway: Mallampati: III        Dental:          Pulmonary:       (-) COPD, asthma and not a current smoker                           Cardiovascular:        (-) hypertension, past MI and  angina                Neuro/Psych:      (-) seizures and CVA           GI/Hepatic/Renal:        (-) GERD, liver disease and no renal disease       Endo/Other:        (-) diabetes mellitus, hypothyroidism, hyperthyroidism, blood dyscrasia               Abdominal:           Vascular:                                          Anesthesia Plan      epidural     ASA 2 - emergent             Anesthetic plan and risks discussed with patient. Plan discussed with CRNA.     Attending anesthesiologist reviewed and agrees with He Deshpande MD   9/2/2020

## 2020-09-02 NOTE — PROGRESS NOTES
*Late Entry*- OB/GYN Resident Interval Note     At [phone] the patient noted to have a deceleration. Immediately entered room and RN was already starting an IV bolus and repositioned mom to her right side. Pulse ox was on. Heart tones briefly returned to the 130s and then dropped again into the 50s so mom was repositioned to the other side. Fetal bradycardia was still noted so mom was positioned on hands and knees. Cervix was rechecked and she remained 6cm dilated, unchanged from her previous exam. At this point, heart tones were down for 7 minutes. Decision made to proceed to OR for an emergent  section due to fetal terminal bradycardia. Verbal consent for  section was discussed with patient in room. Due to urgent nature of case, written consent was not able to be obtained. She was advised of risks of c/s including but not limited to hemorrhage, infection, injury to nearby organs, need for additional operative procedures, injury to the fetus, increased risk of perioperative infection, and exceedingly rare risk of stroke and death. Anesthesia and NICU were made aware. Ancef and azalia were hung during the case for antibiotic prophylaxis. Please see OP note for further details.        Osman Moreno DO   OB/GYN Resident  Pager [phone]  2020, 8:57 PM

## 2020-09-02 NOTE — PLAN OF CARE
Problem: Anxiety:  Goal: Level of anxiety will decrease  Description: Level of anxiety will decrease  Outcome: Completed     Problem: Breathing Pattern - Ineffective:  Goal: Able to breathe comfortably  Description: Able to breathe comfortably  Outcome: Completed     Problem: Fluid Volume - Imbalance:  Goal: Absence of imbalanced fluid volume signs and symptoms  Description: Absence of imbalanced fluid volume signs and symptoms  Outcome: Completed  Goal: Absence of intrapartum hemorrhage signs and symptoms  Description: Absence of intrapartum hemorrhage signs and symptoms  Outcome: Completed     Problem: Infection - Intrapartum Infection:  Goal: Will show no infection signs and symptoms  Description: Will show no infection signs and symptoms  Outcome: Completed     Problem: Labor Process - Prolonged:  Goal: Labor progression, first stage, within specified pattern  Description: Labor progression, first stage, within specified pattern  Outcome: Completed  Goal: Labor progession, second stage, within specified pattern  Description: Labor progession, second stage, within specified pattern  Outcome: Completed  Goal: Uterine contractions within specified parameters  Description: Uterine contractions within specified parameters  Outcome: Completed     Problem:  Screening:  Goal: Ability to make informed decisions regarding treatment has improved  Description: Ability to make informed decisions regarding treatment has improved  Outcome: Completed     Problem: Tissue Perfusion - Uteroplacental, Altered:  Goal: Absence of abnormal fetal heart rate pattern  Description: Absence of abnormal fetal heart rate pattern  Outcome: Completed     Problem: Urinary Retention:  Goal: Experiences of bladder distention will decrease  Description: Experiences of bladder distention will decrease  Outcome: Completed  Goal: Urinary elimination within specified parameters  Description: Urinary elimination within specified parameters  Outcome: Completed

## 2020-09-02 NOTE — ANESTHESIA PROCEDURE NOTES
Epidural Block    Patient location during procedure: OB  Reason for block: labor epidural  Staffing  Anesthesiologist: Nikko Zavala MD  Resident/CRNA: NIEVES Garcia - CRNA  Performed: resident/CRNA   Preanesthetic Checklist  Completed: patient identified, pre-op evaluation, timeout performed, IV checked, risks and benefits discussed, monitors and equipment checked, anesthesia consent given, oxygen available and patient being monitored  Epidural  Patient position: sitting  Prep: Betadine  Patient monitoring: frequent blood pressure checks and continuous pulse ox  Approach: midline  Location: lumbar (1-5)  Injection technique: ENEDELIA saline  Provider prep: sterile gloves and mask  Needle  Needle type: Tuohy   Needle gauge: 17 G  Needle length: 3.5 in  Needle insertion depth: 6.5 cm  Catheter type: end hole  Catheter size: 19 G  Catheter at skin depth: 15 cm  Test dose: negative  Assessment  Sensory level: T6  Hemodynamics: stable  Attempts: 1  Additional Notes  No complications noted

## 2020-09-02 NOTE — ANESTHESIA PRE PROCEDURE
Department of Anesthesiology  Preprocedure Note       Name:  Watson Cunha   Age:  21 y.o.  :  1995                                          MRN:  [id number]         Date:  2020      Surgeon: * No surgeons listed *    Procedure: * No procedures listed *    Medications prior to admission:   Prior to Admission medications    Medication Sig Start Date End Date Taking? Authorizing Provider   ferrous sulfate (FE TABS) 325 (65 Fe) MG EC tablet Take 1 tablet by mouth 2 times daily 20   Romayne Poplar Cacciotti,    acetaminophen (TYLENOL) 325 MG tablet Take 2 tablets by mouth every 6 hours as needed for Pain 5/15/20   NIEVES Rose - CNP   Prenatal Multivit-Min-Fe-FA (PRENATAL FORTE) TABS Take 1 tablet by mouth Daily May substitute with any prenatal vit her insurance will cover. 3/2/20 3/2/21  Rachelle Garcia MD   pyridoxine (RA VITAMIN B-6) 50 MG tablet Take 0.5 tablets by mouth three times daily  Patient not taking: Reported on 2020 3/2/20 3/2/21  Rachelle Garcia MD       Current medications:    Current Facility-Administered Medications   Medication Dose Route Frequency Provider Last Rate Last Dose    lactated ringers infusion   Intravenous Continuous Green Fusi,  mL/hr at 20 0921      sodium chloride flush 0.9 % injection 10 mL  10 mL Intravenous PRN Green Fusi, DO        lidocaine PF 1 % injection 30 mL  30 mL Other PRN Green Fusi, DO        acetaminophen (TYLENOL) tablet 1,000 mg  1,000 mg Oral Q6H PRN Green Fusi, DO        diphenhydrAMINE (BENADRYL) tablet 25 mg  25 mg Oral Q4H PRN Green Fusi, DO        ondansetron (ZOFRAN) injection 4 mg  4 mg Intravenous Q6H PRN Green Fusi, DO        benzocaine-menthol (DERMOPLAST) 20-0.5 % spray   Topical PRN Green Fusi, DO        levonorgestrel (MIRENA) IUD 52 mg 1 each  1 each Intrauterine Once Rena Aguilar, DO        ropivacaine (NAROPIN) 0.2% injection 0.2%                Allergies:     Allergies Allergen Reactions    Motrin [Ibuprofen] Other (See Comments)     Pt experienced internal bleeding due this medication. Pt reports she \" took too much\"  Has tolerated this medication in  the past.        Problem List:    Patient Active Problem List   Diagnosis Code    Hx of GI bleed (2018) Z87.19    Hx of blood transfusion (2018) Z92.89    Family history of sickle cell trait (Pt negative) Z83.2    Hx of headaches Z87.898    Nabothian cysts N88.8    Susceptible to varicella (non-immune), currently pregnant O09.899, Z28.3    Anemia D64.9    Dysmenorrhea N94.6    Elevated BP x 1 O16.3    37 weeks gestation of pregnancy Z3A.37    HRP (high risk pregnancy) O09.90       Past Medical History:        Diagnosis Date    History of blood transfusion 2018    From Motrin use     Menometrorrhagia 5/10/2018    Symptomatic anemia 4/17/2018    From Motrin use     Vision abnormalities        Past Surgical History:        Procedure Laterality Date    COLONOSCOPY  06/01/2018    No lesions seen    [place] COLON CA SCRN NOT HI RSK IND N/A 6/1/2018    COLONOSCOPY performed by Leah Gallardo MD at [place] [address]  04/18/2018    No lesions seen up to the 2nd part of the duodenum.  UPPER GASTROINTESTINAL ENDOSCOPY N/A 4/18/2018    EGD DIAGNOSTIC ONLY performed by Leah Gallardo MD at [address] [place] [address]. History:    Social History     Tobacco Use    Smoking status: Never Smoker    Smokeless tobacco: Never Used   Substance Use Topics    Alcohol use:  No                                Counseling given: Not Answered      Vital Signs (Current):   Vitals:    09/02/20 0354 09/02/20 0430 09/02/20 0800   BP: 131/87  124/74   Pulse: 67  76   Resp: 18  16   Temp: 37.1 °C (98.7 °F)  36.8 °C (98.2 °F)   TempSrc: Oral  Oral   Weight:  194 lb (88 kg)    Height:  5' 4\" (1.626 m)                                               BP Readings from Last 3 Encounters:   09/02/20 124/74   08/28/20 137/87 08/21/20 (!) 142/84       NPO Status:                                                                                 BMI:   Wt Readings from Last 3 Encounters:   09/02/20 194 lb (88 kg)   08/28/20 194 lb (88 kg)   08/21/20 193 lb (87.5 kg)     Body mass index is 33.3 kg/m². CBC:   Lab Results   Component Value Date    WBC 6.8 09/02/2020    RBC 3.43 09/02/2020    HGB 10.5 09/02/2020    HCT 32.7 09/02/2020    MCV 95.3 09/02/2020    RDW 14.4 09/02/2020     09/02/2020       CMP:   Lab Results   Component Value Date     08/21/2020    K 3.6 08/21/2020     08/21/2020    CO2 18 08/21/2020    BUN 6 08/21/2020    CREATININE 0.43 08/21/2020    GFRAA >60 08/21/2020    LABGLOM >60 08/21/2020    GLUCOSE 111 08/21/2020    PROT 6.8 08/21/2020    CALCIUM 9.3 08/21/2020    BILITOT 0.17 08/21/2020    ALKPHOS 63 08/21/2020    AST 16 08/21/2020    ALT 10 08/21/2020       POC Tests: No results for input(s): POCGLU, POCNA, POCK, POCCL, POCBUN, POCHEMO, POCHCT in the last 72 hours.     Coags:   Lab Results   Component Value Date    PROTIME 10.7 04/17/2018    INR 1.0 04/17/2018    APTT 23.6 04/16/2018       HCG (If Applicable):   Lab Results   Component Value Date    PREGTESTUR positive 02/03/2020    HCG POSITIVE (A) 01/26/2020    HCGQUANT 15,831 (H) 01/26/2020        ABGs: No results found for: PHART, PO2ART, IIE1ELI, QFT9RRZ, BEART, D5HSZKXL     Type & Screen (If Applicable):  No results found for: LABABO, LABRH    Drug/Infectious Status (If Applicable):  Lab Results   Component Value Date    HEPCAB NONREACTIVE 12/12/2011       COVID-19 Screening (If Applicable):   Lab Results   Component Value Date    COVID19 Not Detected 09/02/2020         Anesthesia Evaluation   no history of anesthetic complications:   Airway: Mallampati: III        Dental:          Pulmonary:       (-) COPD, asthma and not a current smoker                           Cardiovascular:        (-) hypertension, past MI and  angina Neuro/Psych:      (-) seizures and CVA           GI/Hepatic/Renal:        (-) GERD, liver disease and no renal disease       Endo/Other:        (-) diabetes mellitus, hypothyroidism, hyperthyroidism, blood dyscrasia               Abdominal:           Vascular:                                        Anesthesia Plan      epidural     ASA 1             Anesthetic plan and risks discussed with patient.         Attending anesthesiologist reviewed and agrees with Pre Eval content              NIEVES Egan - CRNA   9/2/2020

## 2020-09-02 NOTE — CONSULTS
Met with mom at bedside, baby boy Jamari Bermudez in [place]. Mom desires to breast feed. Reviewed pumping since she is  from infant. Written educational materials provided. Mom agreeable to pump now. Initiation of Electric Breast Pumping     Pumping Initiated at 1500    Initiated due to    [x]   Baby in NICU   []   Plans exclusive pumping   []   Infant weight loss(supplement)   []   Baby not latching well    Flange Size    Right:   Left:     []   24    []   24     []   27    []   27     []   30    []   30     []   36    []   36  Instructions   [x]   Verbal instructions on how to setup pump and how to use initiation phase   [x]   Written sheet\" How to keep your breast pump kit clean\"   []   Expectation sheet for Breastfeeding mothers with pumping log   [x]   Frequency of pumping   [x]   Collection,labeling and storage of colostrum and milk    Supplies Provided   [x]   Pump initiation kit   [x]   Cleaning supplies (basin and soap)   [x]   Additional flange size   [x]   Oral syringes/snappies   [x]   Patient labels  Both nipples appear inverted. 24 mm flanges applied initially, too large,  Switched to 21 mm flanges -  Both nipples are everting with suction. .  Small drops of colostrum noted.

## 2020-09-02 NOTE — FLOWSHEET NOTE
Pt presents to L and D per wheelchair with c/o lower intermittent abd cramping and back pain that started about three hours ago. Pt denies any LOF or vaginal bleeding and states pos FM.

## 2020-09-02 NOTE — PROGRESS NOTES
[facility] Labor Progress Note    Sonia Session is a 21 y.o. female  at 42w2d  The patient was seen and examined. Her pain is not well controlled. She is requesting an epidural at this time. She reports fetal movement is present, complains of contractions, denies loss of fluid, denies vaginal bleeding. Vital Signs:  Vitals:    20 0354 20 0430 20 0800   BP: 131/87  124/74   Pulse: 67  76   Resp: 18  16   Temp: 98.7 °F (37.1 °C)  98.2 °F (36.8 °C)   TempSrc: Oral  Oral   Weight:  194 lb (88 kg)    Height:  5' 4\" (1.626 m)          FHT: 130, moderate variability, accelerations present, decelerations late, fluid bolus started and mom repositioned   Contractions: regular, every 3 minutes  Cervical Exam: 5 cm dilated, 90 effaced, 0 station  Pitocin: @ 0 mu/min    Membranes: Intact  Scalp Electrode in place: absent  Intrauterine Pressure Catheter in Place: absent    Interventions: AROM performed with clear fluid, mother and baby tolerated well.  IUPC placed     Assessment/Plan:  Sonia Session is a 21 y.o. female  at 37w2d IUP   - GBS negative / Rh positive / R immune   - No indication for GBS prophylaxis   - COVID19 neg     Spontaneous Labor    - VSS   - cEFM and TOCO   - Diet: clears   - IVF: [email or web address]   - S/p morphine and phenergan x1   - AROM performed, clear, @ 0940   - IUPC placed    - Epidural ordered    - Will continue with uterine resuscitation  efforts      Attending updated and in agreement with plan    Jacquie Hendrix DO  OBGYN Resident  2020, 9:27 AM

## 2020-09-02 NOTE — DISCHARGE SUMMARY
Obstetric Discharge Summary  [facility]    Patient Name: Irene Mosher  Patient : 1995  Primary Care Physician: No primary care provider on file. Admit Date: 2020    Principal Diagnosis: IUP at 37w2d, admitted for spontaneous labor      Her pregnancy has been complicated by:   Patient Active Problem List   Diagnosis    Hx of GI bleed (2018)    Hx of blood transfusion (2018)    Family history of sickle cell trait (Pt negative)    Hx of headaches    Nabothian cysts    Susceptible to varicella (non-immune), currently pregnant    Anemia    Dysmenorrhea    37 weeks gestation of pregnancy    HRP (high risk pregnancy)    PLTCS 20 F Apg  Wt 5#14    S/P primary low transverse     Gestational hypertension (G1)       Infection Present?: No  Hospital Acquired: No    Surgical Operations & Procedures:  [] Pitocin Induction of Labor  [] Pitocin Augmentation of Labor  [] Prostaglandin Induction of Labor  [] Mechanical Induction of Labor  [x] Artificial Rupture of Membranes  [x] Intrauterine Pressure Catheter  [] Fetal Scalp Electrode  [] Amnioinfusion  Analgesia: epidural  Delivery Type:  Delivery: See Labor and Delivery Summary   Laceration(s): Absent    Consultations:  NICU and Anesthesia    Pertinent Findings & Procedures:   Irene Mosher is a 21 y.o. female  at 42w2d admitted for spontaneous labor; received morphine/phenergan x1, AROM(clr) and an IUPC. Terminal fetal bradycardia noted that did not respond to uterine resuscitative measures. Decision made to proceed with emergent primary . During surgery she received ancef and azithromycin. She delivered by primary low transverse  a Live Born infant on 20. Information for the patient's :  Farrukh Knott Girl Emmer Night [[id number]]   female   Birth Weight: 5 lb 13.3 oz (2.645 kg)       Apgars: 1 at 1 minute and 9 at 5 minutes.      Postpartum course: normal.    Keflex and flagyl given for 48 hours due to splash prep. POD#1: Hgb 7.5 down from 10.5 on 9/2. Rx printed for Iron on DC. Patient asymptomatic. POD#2: Hgb improved to 8. Patient had another elevated blood pressure, meeting criteria for gestational hypertension. POD#3: PreE labs wnl x1, P/C 0.14. Course of patient: complicated by newly dx gestational hypertension    Discharge to: Home    Readmission planned: no     Recommendations on Discharge:     Medications: Christian Runner   [place] Medication Instructions [facility]:[id number]    Printed on:09/05/20 2277   Medication Information                      acetaminophen (TYLENOL) 325 MG tablet  Take 2 tablets by mouth every 6 hours as needed for Pain             docusate sodium (COLACE) 100 MG capsule  Take 1 capsule by mouth 2 times daily as needed for Constipation             ferrous sulfate (FE TABS) 325 (65 Fe) MG EC tablet  Take 1 tablet by mouth 2 times daily             oxyCODONE-acetaminophen (PERCOCET) 5-325 MG per tablet  Take 1 tablet by mouth every 6 hours as needed for Pain for up to 7 days. Intended supply: 3 days. Take lowest dose possible to manage pain             Prenatal Multivit-Min-Fe-FA (PRENATAL FORTE) TABS  Take 1 tablet by mouth Daily May substitute with any prenatal vit her insurance will cover. pyridoxine (RA VITAMIN B-6) 50 MG tablet  Take 0.5 tablets by mouth three times daily                   Activity: pelvic rest x 6 weeks, no driving on narcotics, no lifting greater than 15 lbs  Diet: regular diet  Follow up: 2 weeks     Condition on discharge: stable    Discharge date: 9/5/20    Janis Randolph DO  Ob/Gyn Resident    Comments:  Home care and follow-up care were reviewed. Pelvic rest, and birth control were reviewed. Signs and symptoms of mastitis and post partum depression were reviewed. The patient is to notify her physician if any of these occur.  The patient was counseled on secondary smoke risks and the increased risk of sudden infant death syndrome and respiratory problems to her baby with exposure. She was counseled on various alternate recommendations to decrease the exposure to secondary smoke to her children.

## 2020-09-02 NOTE — OP NOTE
Operative Note  Department of Obstetrics and Gynecology  [facility]     Patient: Watson Cunha   : 1995  MRN: [id number]       Acct: [de-identified]   PCP: No primary care provider on file. Date of Procedure: 20    Pre-operative Diagnosis: 21 y.o. female  at 42w2d               Terminal Bradycardia               Spontaneous Labor               Hx GI Bleed              Anemia      Post-operative Diagnosis: Living  infant, same as above, suspected 10% placental abruption      Procedure: primary low transverse  section    Indications: Patient is a 21year old  at 42w2d who presented in spontaneous labor. She progressed well to 6 cm. She was ruptured with an IUPC in place when patient noted to have a prolong deceleration. Heart tones did not return to baseline despite multiple position changes and IV fluid boluses. Decision made to proceed with emergent  section 2/2 terminal bradycardia. Surgeon: Dr Josue Salazar   Assistant(s): Rogelio Sabillon PGY3     Anesthesia: epidural with Duramorph    Procedure Details   Due to the emergent nature of the case, written consent was not able to be collected from patient. Verbal consent was obtained after discussing risks and benefits of emergent  section. The patient concurred with the proposed plan, giving informed verbal consent. The patient was taken to the Operating Room quickly, identified as Watson Cunha and the procedure verified as  Delivery. A Time Out was held and the above information confirmed. Heart tones were obtained immediately entering the operating room and remained in the 60s. Anesthesia was able to use her current epidural for anesthesia. SCDs were quickly applied and chang cath was inserted. Heart tones at this time were noted to be in the 120s. Surgical count was able to be performed prior to the case starting. She was draped with an iodine drape.  A Pfannenstiel incision was made and carried down through the subcutaneous tissue to the fascia using scalpel. Fascial incision was made and extended transversely using olmstead scissors and blunt dissection. The fascia was  from the underlying rectus tissue superiorly and inferiorly using blunt dissection. The peritoneum was identified and entered bluntly. Blunt dissection was used to take the peritoneum down sharply. Peritoneal incision was extended longitudinally with blunt stretch, bladder retractor was placed. One lap was inserted into the abdomen as small bowel was protruding through surgical site. A low transverse uterine incision was made using a new scalpel blade. Blunt stretch on the hysterotomy incision was made and the amniotomy was performed revealing Ruptured clear fluid fluid. Delivered from cephalic presentation was a Live Born female infant. The infant was suctioned, dried and the umbilical cord was clamped immediately due to low fetal tone, abnormal fetal coloring and lack of respirations (cry). The infant was taken to the warmer and attended by NICU for evaluation. A second section of cord was clamped and cut and sent for gases. Cord blood was obtained for evaluation. The placenta was removed spontaneously with gentle traction and appeared intact, whole and that the umbilical cord had three vessels noted. Slight placental abruption was noted (~10%). Pitocin was started. The uterine outline appeared normal. The uterus was cleaned of all clots and debris. The uterine incision was closed with running locked sutures of 0 Vicryl. The inferior of the hysterotomy was slightly oozing so an imbricating layer was placed with 0 Vicryl in running fashion. A figure of eight suture was needed in the middle of the hysterotomy incision to obtain excellent hemostasis. Hemostasis was observed. Bilateral abdominal gutters were cleared of all clots and debris.  Bilateral tubes and ovaries were visualized and appeared normal. The hysterotomy was again inspected and found to be hemostatic. Abdomen was copiously irrigated. The sponge that was used to pack the bowel was removed. Peritoneum was reapproximated with running suture of 2-0 Vicryl. Rectus muscles were inspected and found to be hemostatic. Rectus muscle were reapproximated using interrupted 2-0 Vicryl suture. The fascia was then reapproximated with running sutures of 0 Vicryl. The skin was reapproximated with insorb staples. The skin was then cleansed and dressed with steri strips and  bandage in sterile fashion. Instrument, sponge, and needle counts were correct prior the abdominal closure and at the conclusion of the case. The urine remained clear throughout the case. Ancef and azithromycin weregiven for antibiotic prophylaxis. SCDs for DVT prophylaxis remain in place for the post operative period. Due to the patient not being properly prepped prior to skin incision, keflex and flagyl will be given for 48 hours for additional antibiotic coverage. Dr. Kane Betts was present for the entire operation. Findings:  Live Born female infant in cephalic presentation with Apgars of 1 at 1 minute and 9 at 5 minutes, weight 5#14, 10% placental abruption, normal appearing uterus tubes and ovaries   Estimated Blood Loss: 300mL, QBL pending   Total IV Fluids: 800 mL  Urine output: 150 mL clear urine   Drains:  chang catheter  Specimens:  placenta sent to pathology, cord blood and cord gases  Instrument and Sponge Count: Correct  Complications: none  Condition: Infant stable, transfer to NICU, Mother stable, transfer to post anesthesia recovery      Rehan Kirby DO  OB/GYN Resident  9/2/2020, 11:42 AM    This dictation was performed by a resident physician and then was thoroughly reviewed by the attending prior to being signed.

## 2020-09-02 NOTE — FLOWSHEET NOTE
[address], CRNA at bedside. Epidural procedure explained, risks discussed. Pt verbalizes consent for epidural.   [id number] patient positioned for epidural.0958 Time out completed. 1007 catheter placed. 1008 test dose given. Epidural catheter taped and secured per anesthesia. 1014 to low fowlers with left uterine displacement. 1015 loading dose given. 1015 pump initiated. Pt tolerated procedure well.

## 2020-09-02 NOTE — CARE COORDINATION
POST-PARTUM/WIN INITIAL DISCHARGE PLANNING/CARE COORDINATION    37 weeks gestation of pregnancy [Z3A.37]  HRP (high risk pregnancy) [O09.90]    HPI: Writer met w/ patient at bedside to discuss DCP. Anticipate DC of couplet 9/6/2020 after C/S 9/2/2020 of FEMALE @ 1051 at 37w2d. Infant name on BC: Annabelle Caldwell (pronounced \"Jossie\") Brent. Infant to NICU . Infant PCP [facility]. FOB: Salomón Keller phone [phone]    Writer verified name/address/phone number/[employer] [facility]    Writer notified patient has 30 days from date of birth to add infant to insurance policy. June Lopez verbalized understanding and will call [facility] will be adding to Annemarie Blakely. June Lopez verbalized has all necessary items for infant. No previous home care or dme and no anticipated need for home nursing or dme. CM continue to follow for any DC needs.

## 2020-09-02 NOTE — FLOWSHEET NOTE
LATE ENTRY:    1032- RN to room, FHT's 60's, patient turned to right side, lactated ringers bolus initiated  1033- Dr Jayshree Alexis to room at this time  9850 1142- FHTs to 130's and back down to 50's, patient turned to left side  1035- Dr Gisela Hyde 61 to room  [phone]- Patient turned to hands and knees  1037- Dr. Jayshree Alexis performs SVE 6/90/0, no change  1039- Emergency C/S called  [phone]- Patient in [facility] [address]- FHTs resume in [address]

## 2020-09-03 LAB
ABSOLUTE EOS #: 0.04 K/UL (ref 0–0.44)
ABSOLUTE IMMATURE GRANULOCYTE: 0.07 K/UL (ref 0–0.3)
ABSOLUTE LYMPH #: 2.13 K/UL (ref 1.1–3.7)
ABSOLUTE MONO #: 0.75 K/UL (ref 0.1–1.2)
BASOPHILS # BLD: 0 % (ref 0–2)
BASOPHILS ABSOLUTE: <0.03 K/UL (ref 0–0.2)
C TRACH DNA GENITAL QL NAA+PROBE: NEGATIVE
CULTURE: NORMAL
DIFFERENTIAL TYPE: ABNORMAL
EOSINOPHILS RELATIVE PERCENT: 1 % (ref 1–4)
HCT VFR BLD CALC: 24.2 % (ref 36.3–47.1)
HEMOGLOBIN: 7.5 G/DL (ref 11.9–15.1)
IMMATURE GRANULOCYTES: 1 %
LYMPHOCYTES # BLD: 24 % (ref 24–43)
Lab: NORMAL
MCH RBC QN AUTO: 30 PG (ref 25.2–33.5)
MCHC RBC AUTO-ENTMCNC: 31 G/DL (ref 28.4–34.8)
MCV RBC AUTO: 96.8 FL (ref 82.6–102.9)
MONOCYTES # BLD: 9 % (ref 3–12)
N. GONORRHOEAE DNA: NEGATIVE
NRBC AUTOMATED: 0 PER 100 WBC
PDW BLD-RTO: 14.5 % (ref 11.8–14.4)
PLATELET # BLD: 171 K/UL (ref 138–453)
PLATELET ESTIMATE: ABNORMAL
PMV BLD AUTO: 11.8 FL (ref 8.1–13.5)
RBC # BLD: 2.5 M/UL (ref 3.95–5.11)
RBC # BLD: ABNORMAL 10*6/UL
SEG NEUTROPHILS: 66 % (ref 36–65)
SEGMENTED NEUTROPHILS ABSOLUTE COUNT: 5.84 K/UL (ref 1.5–8.1)
SPECIMEN DESCRIPTION: NORMAL
SPECIMEN DESCRIPTION: NORMAL
WBC # BLD: 8.9 K/UL (ref 3.5–11.3)
WBC # BLD: ABNORMAL 10*3/UL

## 2020-09-03 PROCEDURE — 6370000000 HC RX 637 (ALT 250 FOR IP): Performed by: STUDENT IN AN ORGANIZED HEALTH CARE EDUCATION/TRAINING PROGRAM

## 2020-09-03 PROCEDURE — 36415 COLL VENOUS BLD VENIPUNCTURE: CPT

## 2020-09-03 PROCEDURE — 85025 COMPLETE CBC W/AUTO DIFF WBC: CPT

## 2020-09-03 PROCEDURE — 1220000000 HC SEMI PRIVATE OB R&B

## 2020-09-03 PROCEDURE — 99024 POSTOP FOLLOW-UP VISIT: CPT | Performed by: OBSTETRICS & GYNECOLOGY

## 2020-09-03 PROCEDURE — 6360000002 HC RX W HCPCS: Performed by: STUDENT IN AN ORGANIZED HEALTH CARE EDUCATION/TRAINING PROGRAM

## 2020-09-03 RX ORDER — LANOLIN ALCOHOL/MO/W.PET/CERES
325 CREAM (GRAM) TOPICAL 2 TIMES DAILY
Qty: 90 TABLET | Refills: 0 | Status: SHIPPED | OUTPATIENT
Start: 2020-09-03 | End: 2021-06-03

## 2020-09-03 RX ORDER — OXYCODONE HYDROCHLORIDE AND ACETAMINOPHEN 5; 325 MG/1; MG/1
1 TABLET ORAL EVERY 6 HOURS PRN
Qty: 24 TABLET | Refills: 0 | Status: SHIPPED | OUTPATIENT
Start: 2020-09-03 | End: 2020-09-10

## 2020-09-03 RX ORDER — DOCUSATE SODIUM 100 MG/1
100 CAPSULE, LIQUID FILLED ORAL 2 TIMES DAILY PRN
Qty: 60 CAPSULE | Refills: 0 | Status: SHIPPED | OUTPATIENT
Start: 2020-09-03 | End: 2020-10-21 | Stop reason: ALTCHOICE

## 2020-09-03 RX ADMIN — OXYCODONE HYDROCHLORIDE AND ACETAMINOPHEN 2 TABLET: 5; 325 TABLET ORAL at 22:03

## 2020-09-03 RX ADMIN — METRONIDAZOLE 500 MG: 500 TABLET, FILM COATED ORAL at 06:14

## 2020-09-03 RX ADMIN — CEPHALEXIN 500 MG: 500 CAPSULE ORAL at 06:14

## 2020-09-03 RX ADMIN — KETOROLAC TROMETHAMINE 30 MG: 30 INJECTION, SOLUTION INTRAMUSCULAR at 00:19

## 2020-09-03 RX ADMIN — CEPHALEXIN 500 MG: 500 CAPSULE ORAL at 14:35

## 2020-09-03 RX ADMIN — OXYCODONE HYDROCHLORIDE AND ACETAMINOPHEN 1 TABLET: 5; 325 TABLET ORAL at 10:34

## 2020-09-03 RX ADMIN — CEPHALEXIN 500 MG: 500 CAPSULE ORAL at 22:03

## 2020-09-03 RX ADMIN — DOCUSATE SODIUM 100 MG: 100 CAPSULE, LIQUID FILLED ORAL at 22:03

## 2020-09-03 RX ADMIN — OXYCODONE HYDROCHLORIDE AND ACETAMINOPHEN 1 TABLET: 5; 325 TABLET ORAL at 14:35

## 2020-09-03 RX ADMIN — METRONIDAZOLE 500 MG: 500 TABLET, FILM COATED ORAL at 14:35

## 2020-09-03 RX ADMIN — METRONIDAZOLE 500 MG: 500 TABLET, FILM COATED ORAL at 22:03

## 2020-09-03 RX ADMIN — OXYCODONE HYDROCHLORIDE AND ACETAMINOPHEN 1 TABLET: 5; 325 TABLET ORAL at 06:21

## 2020-09-03 RX ADMIN — OXYCODONE HYDROCHLORIDE AND ACETAMINOPHEN 2 TABLET: 5; 325 TABLET ORAL at 18:30

## 2020-09-03 RX ADMIN — DOCUSATE SODIUM 100 MG: 100 CAPSULE, LIQUID FILLED ORAL at 10:34

## 2020-09-03 ASSESSMENT — PAIN SCALES - GENERAL
PAINLEVEL_OUTOF10: 4
PAINLEVEL_OUTOF10: 5
PAINLEVEL_OUTOF10: 4
PAINLEVEL_OUTOF10: 7
PAINLEVEL_OUTOF10: 7
PAINLEVEL_OUTOF10: 6

## 2020-09-03 NOTE — PLAN OF CARE
Problem: Pain:  Goal: Pain level will decrease  Description: Pain level will decrease  Outcome: Ongoing  Goal: Control of acute pain  Description: Control of acute pain  Outcome: Ongoing  Goal: Control of chronic pain  Description: Control of chronic pain  Outcome: Ongoing     Problem: Discharge Planning:  Goal: Discharged to appropriate level of care  Description: Discharged to appropriate level of care  Outcome: Ongoing     Problem: Infection - Surgical Site:  Goal: Will show no infection signs and symptoms  Description: Will show no infection signs and symptoms  Outcome: Ongoing

## 2020-09-03 NOTE — PLAN OF CARE
Problem: Pain:  Goal: Pain level will decrease  Description: Pain level will decrease  Outcome: Met This Shift  Goal: Control of acute pain  Description: Control of acute pain  Outcome: Met This Shift  Goal: Control of chronic pain  Description: Control of chronic pain  Outcome: Met This Shift       Problem: Pain - Acute:  Goal: Able to cope with pain  Description: Able to cope with pain  Outcome: Met This Shift     Problem: Discharge Planning:  Goal: Discharged to appropriate level of care  Description: Discharged to appropriate level of care  Outcome: Met This Shift     Problem: Infection - Surgical Site:  Goal: Will show no infection signs and symptoms  Description: Will show no infection signs and symptoms  Outcome: Met This Shift

## 2020-09-03 NOTE — CARE COORDINATION
Social Work     Sw reviewed medical record (current active problem list) and tox screens and found no concerns. Sw spoke with mom briefly to explain Sw role, inquire if any needs or concerns, and provide safe sleep education and discuss. Mom denied any needs or questions and informs baby has a safe sleep environment. Mom reports this is her first child and that she has a very strong support system with fob, his mom, her mom, step mom and renate. Mom reports [facility] will be pediatrician. Sw encouraged mom to reach out if any issues or concerns arise.

## 2020-09-03 NOTE — LACTATION NOTE
Complains of sore, bleeding nipples after pumping this am. Right nipple reddened and bleeding, left reddened. Gel pads applied, instructed to call with next pumping to check flange size.

## 2020-09-03 NOTE — PROGRESS NOTES
Ob Attending    Post Operative Day #1     S. Patient doing well. She has no concerns or complaints. Reports minimal lochia. Ambulating, tolerating regular diet. Voiding spontaneously. O:   Vitals:    20 0029 20 0400 20 0830   BP: 114/78 116/66 112/76 124/85   Pulse: 81 80 75 93   Resp: 16 17 16 17   Temp: 98.3 °F (36.8 °C) 98.4 °F (36.9 °C) 98.2 °F (36.8 °C) 98.7 °F (37.1 °C)   TempSrc: Oral Oral Oral    SpO2: 100% 98% 99%    Weight:       Height:         PE: Abd soft, appropriately tender incision c/d/i with steri-strips. A/P: 21 y  POD#1 s/p PLTCS due to suspected abruption and terminal bradycardia. She is recovering well. Hemoglobin 7.5. She is asymptomatic.     - continue routine post-operative care. - post-op anemia will monitor, repeat cbc should she develop symptoms of anemia.   -Desires IUD at her post-partum visit for dysmenorrhea. - anticipate discharge POD #2 or 3 when meeting discharge criteria. Ros Mckay MD      Date: 9/3/2020  Time: 1:15 PM      Patient Name: Brandin Osorio  Patient : 1995  Room/Bed: 7831/9757-38  Admission Date/Time: 2020  3:47 AM        Attending Physician Statement  I have personally seen, evaluated and discussed the care of Brandin Osorio, including pertinent history and exam findings with the resident. I have reviewed and edited their note in the electronic medical record. The key elements of all parts of the encounter have been performed/reviewed by me. I agree with the assessment, plan and orders as documented by the resident. The level of care submitted represents to the best of my ability the care documented in the medical record today. GC Modifier. This service has been performed in part by a resident under the direction of a teaching physician.         Attending's Name:  Ros Mckay MD        I have reviewed Dr. Femi Erickson note as well and agree with the documentation from today 9/3/2020.

## 2020-09-04 PROBLEM — O13.9 GESTATIONAL HYPERTENSION: Status: ACTIVE | Noted: 2020-09-04

## 2020-09-04 LAB
HCT VFR BLD CALC: 25.5 % (ref 36.3–47.1)
HEMOGLOBIN: 8 G/DL (ref 11.9–15.1)

## 2020-09-04 PROCEDURE — 1220000000 HC SEMI PRIVATE OB R&B

## 2020-09-04 PROCEDURE — 99231 SBSQ HOSP IP/OBS SF/LOW 25: CPT | Performed by: OBSTETRICS & GYNECOLOGY

## 2020-09-04 PROCEDURE — 6370000000 HC RX 637 (ALT 250 FOR IP): Performed by: STUDENT IN AN ORGANIZED HEALTH CARE EDUCATION/TRAINING PROGRAM

## 2020-09-04 PROCEDURE — 36415 COLL VENOUS BLD VENIPUNCTURE: CPT

## 2020-09-04 PROCEDURE — 85014 HEMATOCRIT: CPT

## 2020-09-04 PROCEDURE — 85018 HEMOGLOBIN: CPT

## 2020-09-04 PROCEDURE — 2580000003 HC RX 258: Performed by: STUDENT IN AN ORGANIZED HEALTH CARE EDUCATION/TRAINING PROGRAM

## 2020-09-04 RX ORDER — BACITRACIN, NEOMYCIN, POLYMYXIN B 400; 3.5; 5 [USP'U]/G; MG/G; [USP'U]/G
OINTMENT TOPICAL 2 TIMES DAILY
Status: DISCONTINUED | OUTPATIENT
Start: 2020-09-04 | End: 2020-09-05 | Stop reason: HOSPADM

## 2020-09-04 RX ADMIN — DOCUSATE SODIUM 100 MG: 100 CAPSULE, LIQUID FILLED ORAL at 22:35

## 2020-09-04 RX ADMIN — DOCUSATE SODIUM 100 MG: 100 CAPSULE, LIQUID FILLED ORAL at 09:34

## 2020-09-04 RX ADMIN — OXYCODONE HYDROCHLORIDE AND ACETAMINOPHEN 2 TABLET: 5; 325 TABLET ORAL at 09:34

## 2020-09-04 RX ADMIN — OXYCODONE HYDROCHLORIDE AND ACETAMINOPHEN 1 TABLET: 5; 325 TABLET ORAL at 05:28

## 2020-09-04 RX ADMIN — CEPHALEXIN 500 MG: 500 CAPSULE ORAL at 05:28

## 2020-09-04 RX ADMIN — OXYCODONE HYDROCHLORIDE AND ACETAMINOPHEN 1 TABLET: 5; 325 TABLET ORAL at 13:37

## 2020-09-04 RX ADMIN — Medication 10 ML: at 09:35

## 2020-09-04 RX ADMIN — POLYMYXIN B SULFATE, BACITRACIN ZINC, NEOMYCIN SULFATE: 5000; 3.5; 4 OINTMENT TOPICAL at 14:08

## 2020-09-04 RX ADMIN — CEPHALEXIN 500 MG: 500 CAPSULE ORAL at 13:36

## 2020-09-04 RX ADMIN — POLYMYXIN B SULFATE, BACITRACIN ZINC, NEOMYCIN SULFATE: 5000; 3.5; 4 OINTMENT TOPICAL at 22:37

## 2020-09-04 RX ADMIN — Medication 1 TABLET: at 09:33

## 2020-09-04 RX ADMIN — CEPHALEXIN 500 MG: 500 CAPSULE ORAL at 22:35

## 2020-09-04 RX ADMIN — METRONIDAZOLE 500 MG: 500 TABLET, FILM COATED ORAL at 22:35

## 2020-09-04 RX ADMIN — OXYCODONE HYDROCHLORIDE AND ACETAMINOPHEN 2 TABLET: 5; 325 TABLET ORAL at 22:36

## 2020-09-04 RX ADMIN — OXYCODONE HYDROCHLORIDE AND ACETAMINOPHEN 1 TABLET: 5; 325 TABLET ORAL at 17:59

## 2020-09-04 RX ADMIN — METRONIDAZOLE 500 MG: 500 TABLET, FILM COATED ORAL at 05:28

## 2020-09-04 RX ADMIN — METRONIDAZOLE 500 MG: 500 TABLET, FILM COATED ORAL at 13:36

## 2020-09-04 ASSESSMENT — PAIN SCALES - GENERAL
PAINLEVEL_OUTOF10: 6
PAINLEVEL_OUTOF10: 8
PAINLEVEL_OUTOF10: 6
PAINLEVEL_OUTOF10: 7
PAINLEVEL_OUTOF10: 7

## 2020-09-04 NOTE — PROGRESS NOTES
OB/GYN Resident Interval Note    Labs reviewed and Hgb improved to 8 this morning  Will continue with iron supplementation on discharge        Carissa Blackwood DO   OB/GYN Resident  Pager [phone]  9/4/2020, 8:33 AM

## 2020-09-04 NOTE — PROGRESS NOTES
POST OPERATIVE DAY # 2    Maeve Arredondo is a 21 y.o. female   This patient was seen and examined today. Her pregnancy was complicated by:   Patient Active Problem List   Diagnosis    Hx of GI bleed (2018)    Hx of blood transfusion (2018)    Family history of sickle cell trait (Pt negative)    Hx of headaches    Nabothian cysts    Susceptible to varicella (non-immune), currently pregnant    Anemia    Dysmenorrhea    Elevated BP x 1    37 weeks gestation of pregnancy    HRP (high risk pregnancy)    PLTCS 20 F Apg 1/** Wt 5#14    S/P primary low transverse        Today she is doing well without any chief complaint. Her lochia is light. She denies chest pain, shortness of breath, headache, lightheadedness, blurred vision and peripheral edema. She is  breast feeding and she denies any signs or symptoms of mastitis. She is ambulating well. She is voiding without difficulty. She currently denies S/S of postpartum depression. Flatus present. Bowel movement absent. She is tolerating solids.     Vital Signs:  Vitals:    20 0830 20 1200 20 1650 20   BP: 124/85 123/82 126/87 131/82   Pulse: 93 92 95 106   Resp: 17 17 18 19   Temp: 98.7 °F (37.1 °C) 97.2 °F (36.2 °C) 98.5 °F (36.9 °C) 98.4 °F (36.9 °C)   TempSrc:    Oral   SpO2:       Weight:       Height:           Urine Input & Output last 24hrs:     Intake/Output Summary (Last 24 hours) at 2020 0703  Last data filed at 9/3/2020 1001  Gross per 24 hour   Intake --   Output 600 ml   Net -600 ml       Physical Exam:  General:  no apparent distress, alert and cooperative  Neurologic:  alert, oriented, normal speech, no focal findings or movement disorder noted  Lungs:  No increased work of breathing, good air exchange, clear to auscultation bilaterally, no crackles or wheezing  Heart:  regular rate and rhythm    Abdomen: abdomen soft, non-distended, non-tender  Fundus: non-tender, normal size, firm, below umbilicus  Incision: clean, and intact. Scant dried serosanguinous fluid noted  Extremities:  no calf tenderness, non edematous    Labs:  Lab Results   Component Value Date    WBC 8.9 2020    HGB 7.5 (L) 2020    HCT 24.2 (L) 2020    MCV 96.8 2020     2020       Assessment/Plan:  1. Osiris Morin is a  POD # 2 s/p PLTCS   - Doing well, VSS    - female infant in NICU   - Encourage ambulation and use of incentive spirometer   - S/p chang catheter and saline lock IV on POD #1   2. Rh positive/Rubella immune  3. Elevated BP x1  - PreE labs wnl x1 ()   - Denies s/s PreE  - BP normotensive   4. Breast feeding   - Denies s/s mastitis  5. Dysmenorrhea  - Mirena IUD @ PP visit  6. Anemia  - Hgb 9.8>7.5, Repeat H&H this AM   - Rx Iron for DC  - Patient asymptomatic  - Will continue to monitor closely   - VSS  7. Hx GI bleed req blood tx  -  2 Motrin use   - No motrin for PP pain control   8. Varicella non-immune   - Vaccine ordered  - Will notify RN to administer prior to DC  9. BMI 33   - S/p Keflex/Flaygl 48hrs   10. Continue post-op care. Counseling Completed:  Secondary Smoke risks and Sudden Infant Death Syndrome were reviewed with recommendations. Infant sleeping, \"back to sleep\" and avoidance of co-sleeping recommendations were reviewed. Signs and Symptoms of Post Partum Depression were reviewed. The patient is to call if any occur. Signs and symptoms of Mastitis were reviewed. The patient is to call if any occur for follow up. Discharge instructions including pelvic rest, incision care, 15 lb weight restriction, no driving with pain medicine and office follow-up were reviewed with patient     Attending Physician: Dr. Natividad Schaumann, [place]  Ob/Gyn Resident  2020, 7:03 AM           Attending Physician Statement  I have discussed the care of Osiris Morin, including pertinent history and exam findings,  with the resident.  I have seen and examined the patient and the key elements of all parts of the encounter have been performed by me. I agree with the assessment, plan and orders as documented by the resident.   [facility] Modifier)    Eboni Nguyen DO

## 2020-09-04 NOTE — LACTATION NOTE
Mom has inverted nipple on right and soreness on left side. With mom using currently the 24 mm flanges observed tightness, change to 27 mm flanges. Discussed warm salt water soaks and purlan and gel pads for comfort. Nipples bilaterally note blood. Mom will call for concerns.

## 2020-09-04 NOTE — PROGRESS NOTES
POST OPERATIVE DAY # 2    Satya Maguire is a 21 y.o. female   This patient was examined at bedside, and claims that she has no current complaints. Patient is recovering well postoperative day 2 from her low transverse section for terminal fetal bradycardia. Patient's abdomen was examined low transverse incision is clean and dry Steri-Strips are in place without any weepage or seeping through the dressings. Patient states that her pain is well controlled on her current pain regimen and that she has no other evolving or constitutional complaints associated with her condition. Patient states that she is tolerating a general diet, urinating and having bowel movements, able to ambulate on her own and feeling overall very well. Patient would like nothing more than to see her \"sweet daughter\" and be able to take her home when the time is appropriate. Patient denies any headache, nausea, vomiting, chest pain, shortness of breath, abdominal pain not associated with her low transverse incision, numbness or tingling of the hands, feet, or face feelings of her heart racing out of her chest, pressure behind her eyes or any other new or evolving symptoms.     Her pregnancy was complicated by:   Patient Active Problem List   Diagnosis    Hx of GI bleed (2018)    Hx of blood transfusion (2018)    Family history of sickle cell trait (Pt negative)    Hx of headaches    Nabothian cysts    Susceptible to varicella (non-immune), currently pregnant    Anemia    Dysmenorrhea    Elevated BP x 1    37 weeks gestation of pregnancy    HRP (high risk pregnancy)    PLTCS 20 F Apg 1/** Wt 5#14    S/P primary low transverse        Vital Signs:  Vitals:    20   BP: 131/82   Pulse: 106   Resp: 19   Temp: 98.4 °F (36.9 °C)   SpO2:          Urine Input & Output last 24hrs:     Intake/Output Summary (Last 24 hours) at 2020 0705  Last data filed at 9/3/2020 1001  Gross per 24 hour   Intake --   Output 600 pelvic rest, incision care, 15 lb weight restriction, no driving with pain medicine and office follow-up were reviewed with patient     Attending Physician: Dr. Kimberly Salazar MD  9/4/2020, 7:05 AM

## 2020-09-05 VITALS
TEMPERATURE: 98.2 F | WEIGHT: 194 LBS | OXYGEN SATURATION: 100 % | SYSTOLIC BLOOD PRESSURE: 123 MMHG | RESPIRATION RATE: 16 BRPM | HEART RATE: 93 BPM | HEIGHT: 64 IN | DIASTOLIC BLOOD PRESSURE: 89 MMHG | BODY MASS INDEX: 33.12 KG/M2

## 2020-09-05 LAB
ABSOLUTE EOS #: 0.07 K/UL (ref 0–0.44)
ABSOLUTE IMMATURE GRANULOCYTE: 0.11 K/UL (ref 0–0.3)
ABSOLUTE LYMPH #: 1.5 K/UL (ref 1.1–3.7)
ABSOLUTE MONO #: 0.52 K/UL (ref 0.1–1.2)
ALBUMIN SERPL-MCNC: 2.9 G/DL (ref 3.5–5.2)
ALBUMIN/GLOBULIN RATIO: 1.1 (ref 1–2.5)
ALP BLD-CCNC: 54 U/L (ref 35–104)
ALT SERPL-CCNC: 21 U/L (ref 5–33)
ANION GAP SERPL CALCULATED.3IONS-SCNC: 8 MMOL/L (ref 9–17)
AST SERPL-CCNC: 36 U/L
BASOPHILS # BLD: 0 % (ref 0–2)
BASOPHILS ABSOLUTE: 0.03 K/UL (ref 0–0.2)
BILIRUB SERPL-MCNC: 0.17 MG/DL (ref 0.3–1.2)
BUN BLDV-MCNC: 6 MG/DL (ref 6–20)
BUN/CREAT BLD: ABNORMAL (ref 9–20)
CALCIUM SERPL-MCNC: 8.4 MG/DL (ref 8.6–10.4)
CHLORIDE BLD-SCNC: 105 MMOL/L (ref 98–107)
CO2: 23 MMOL/L (ref 20–31)
CREAT SERPL-MCNC: 0.5 MG/DL (ref 0.5–0.9)
CREATININE URINE: 98.6 MG/DL (ref 28–217)
DIFFERENTIAL TYPE: ABNORMAL
EOSINOPHILS RELATIVE PERCENT: 1 % (ref 1–4)
GFR AFRICAN AMERICAN: >60 ML/MIN
GFR NON-AFRICAN AMERICAN: >60 ML/MIN
GFR SERPL CREATININE-BSD FRML MDRD: ABNORMAL ML/MIN/{1.73_M2}
GFR SERPL CREATININE-BSD FRML MDRD: ABNORMAL ML/MIN/{1.73_M2}
GLUCOSE BLD-MCNC: 75 MG/DL (ref 70–99)
HCT VFR BLD CALC: 25.6 % (ref 36.3–47.1)
HEMOGLOBIN: 8 G/DL (ref 11.9–15.1)
IMMATURE GRANULOCYTES: 1 %
LYMPHOCYTES # BLD: 16 % (ref 24–43)
MCH RBC QN AUTO: 30.2 PG (ref 25.2–33.5)
MCHC RBC AUTO-ENTMCNC: 31.3 G/DL (ref 28.4–34.8)
MCV RBC AUTO: 96.6 FL (ref 82.6–102.9)
MONOCYTES # BLD: 6 % (ref 3–12)
NRBC AUTOMATED: 0.2 PER 100 WBC
PDW BLD-RTO: 14.9 % (ref 11.8–14.4)
PLATELET # BLD: 205 K/UL (ref 138–453)
PLATELET ESTIMATE: ABNORMAL
PMV BLD AUTO: 10.9 FL (ref 8.1–13.5)
POTASSIUM SERPL-SCNC: 3.5 MMOL/L (ref 3.7–5.3)
RBC # BLD: 2.65 M/UL (ref 3.95–5.11)
RBC # BLD: ABNORMAL 10*6/UL
SEG NEUTROPHILS: 76 % (ref 36–65)
SEGMENTED NEUTROPHILS ABSOLUTE COUNT: 7.26 K/UL (ref 1.5–8.1)
SODIUM BLD-SCNC: 136 MMOL/L (ref 135–144)
TOTAL PROTEIN, URINE: 14 MG/DL
TOTAL PROTEIN: 5.6 G/DL (ref 6.4–8.3)
URINE TOTAL PROTEIN CREATININE RATIO: 0.14 (ref 0–0.2)
WBC # BLD: 9.5 K/UL (ref 3.5–11.3)
WBC # BLD: ABNORMAL 10*3/UL

## 2020-09-05 PROCEDURE — 82570 ASSAY OF URINE CREATININE: CPT

## 2020-09-05 PROCEDURE — 2500000003 HC RX 250 WO HCPCS: Performed by: STUDENT IN AN ORGANIZED HEALTH CARE EDUCATION/TRAINING PROGRAM

## 2020-09-05 PROCEDURE — 90471 IMMUNIZATION ADMIN: CPT | Performed by: STUDENT IN AN ORGANIZED HEALTH CARE EDUCATION/TRAINING PROGRAM

## 2020-09-05 PROCEDURE — 85025 COMPLETE CBC W/AUTO DIFF WBC: CPT

## 2020-09-05 PROCEDURE — 99232 SBSQ HOSP IP/OBS MODERATE 35: CPT | Performed by: OBSTETRICS & GYNECOLOGY

## 2020-09-05 PROCEDURE — 6370000000 HC RX 637 (ALT 250 FOR IP): Performed by: STUDENT IN AN ORGANIZED HEALTH CARE EDUCATION/TRAINING PROGRAM

## 2020-09-05 PROCEDURE — 80053 COMPREHEN METABOLIC PANEL: CPT

## 2020-09-05 PROCEDURE — 84156 ASSAY OF PROTEIN URINE: CPT

## 2020-09-05 PROCEDURE — 36415 COLL VENOUS BLD VENIPUNCTURE: CPT

## 2020-09-05 RX ADMIN — VARICELLA VIRUS VACCINE LIVE 0.5 ML: 1350 INJECTION, POWDER, LYOPHILIZED, FOR SUSPENSION SUBCUTANEOUS at 17:42

## 2020-09-05 RX ADMIN — OXYCODONE HYDROCHLORIDE AND ACETAMINOPHEN 1 TABLET: 5; 325 TABLET ORAL at 12:34

## 2020-09-05 RX ADMIN — OXYCODONE HYDROCHLORIDE AND ACETAMINOPHEN 1 TABLET: 5; 325 TABLET ORAL at 08:14

## 2020-09-05 RX ADMIN — DOCUSATE SODIUM 100 MG: 100 CAPSULE, LIQUID FILLED ORAL at 08:14

## 2020-09-05 RX ADMIN — OXYCODONE HYDROCHLORIDE AND ACETAMINOPHEN 1 TABLET: 5; 325 TABLET ORAL at 17:12

## 2020-09-05 RX ADMIN — OXYCODONE HYDROCHLORIDE AND ACETAMINOPHEN 2 TABLET: 5; 325 TABLET ORAL at 03:39

## 2020-09-05 ASSESSMENT — PAIN SCALES - GENERAL
PAINLEVEL_OUTOF10: 7
PAINLEVEL_OUTOF10: 6
PAINLEVEL_OUTOF10: 7
PAINLEVEL_OUTOF10: 5

## 2020-09-05 NOTE — PROGRESS NOTES
CLINICAL PHARMACY NOTE: MEDS TO [address] Select Patient?: No  Total # of Prescriptions Filled: 3   The following medications were delivered to the patient:  · Oxycodone-Acetaminophen 5mg/325mg  · Dok 100mg (Colace)  · Ferrous Sulfate 325mg  Total # of Interventions Completed: 0  Time Spent (min): 30    Additional Documentation:    Ashely Done off to RN at nurses station since no copay and patient was in NICU

## 2020-09-05 NOTE — PROGRESS NOTES
Modifier. This service has been performed in part by a resident under the direction of a teaching physician. POD#3. Doing well, denies complaints. Infant still in NICU and is requesting continued inpatient stay until tomorrow. Continue post-op care.      Attending's Name:  Cuco Leonard DO

## 2020-09-05 NOTE — LACTATION NOTE
Onset of supply noted. Reviewed using the maintenance phase of pumping. Larger milk containers given. Mom reports using lanolin eases nipple sensitivity.

## 2020-09-07 LAB — SURGICAL PATHOLOGY REPORT: NORMAL

## 2020-09-09 ENCOUNTER — POSTPARTUM VISIT (OUTPATIENT)
Dept: OBGYN | Age: 25
End: 2020-09-09
Payer: COMMERCIAL

## 2020-09-09 VITALS
HEIGHT: 64 IN | WEIGHT: 183.31 LBS | SYSTOLIC BLOOD PRESSURE: 135 MMHG | DIASTOLIC BLOOD PRESSURE: 94 MMHG | BODY MASS INDEX: 31.3 KG/M2 | HEART RATE: 69 BPM

## 2020-09-09 PROCEDURE — 99211 OFF/OP EST MAY X REQ PHY/QHP: CPT | Performed by: STUDENT IN AN ORGANIZED HEALTH CARE EDUCATION/TRAINING PROGRAM

## 2020-09-09 PROCEDURE — 99211 OFF/OP EST MAY X REQ PHY/QHP: CPT

## 2020-09-09 PROCEDURE — 99024 POSTOP FOLLOW-UP VISIT: CPT | Performed by: STUDENT IN AN ORGANIZED HEALTH CARE EDUCATION/TRAINING PROGRAM

## 2020-09-09 NOTE — PROGRESS NOTES
Postpartum Visit    Eri Berman is a 21 y.o. female , 1 weeks Post Partum s/p primary  section, low transverse incision on 20    The patient was seen. She has no chief complaint today. Her pregnancy was complicated by gHTN. She is  breast feeding and denies signs or symptoms of mastitis. The patient completed the E.P.D.S. Post Partum Depression Evaluation form and EPDS Score of 0. She does not have signs or symptoms of post partum depression. She denies any suicidal thoughts with a plan, intent to harm others and delusional ideas. She does admit to having good home support. Today her lochia is light she denies any dizziness or shortness of breath. Her bowels are regular and she denies any urinary tract symptomology. She desires Mirena IUD for history of dysmenorrhea at 6 week PP appointment . OB History    Para Term  AB Living   1 1 1 0 0 1   SAB TAB Ectopic Molar Multiple Live Births   0 0 0 0 0 1     Patient Active Problem List   Diagnosis    Hx of GI bleed (2018)    Hx of blood transfusion (2018)    Family history of sickle cell trait (Pt negative)    Hx of headaches    Nabothian cysts    Susceptible to varicella (non-immune), currently pregnant    Anemia    Dysmenorrhea    37 weeks gestation of pregnancy    HRP (high risk pregnancy)    PLTCS 20 F Apg  Wt 5#14    S/P primary low transverse     Gestational hypertension (G1)       Vitals:   Blood pressure (!) 141/88, pulse 91, height 5' 4\" (1.626 m), weight 183 lb 5 oz (83.2 kg), last menstrual period 2019, currently breastfeeding. Physical Exam:  General:  no apparent distress, alert and cooperative  Lungs:  No increased work of breathing, good air exchange, clear to auscultation bilaterally, no crackles or wheezing  Heart:  regular rate and rhythm and no murmur    Abdomen: Abdomen soft, non-tender.  BS normal. No masses,  No organomegaly  Fundus: non-tender, normal size, firm, below umbilicus  Perineum: not inspected  Incision: clean, dry and intact w/ steri strips in place  Extremities:  no calf tenderness, non edematous    Assessment:  Dangelo Latif is a 21 y.o. female , 1 weeks Post Partum s/p primary  section, low transverse incision on 20   - Doing well, continue routine post partum care   - EPDS: 0   - Family planning: Desires Mirena IUD at 6 week PP for history of dysmenorrhea    - Indications for 2hr 75g GTT: Not indicated    gHTN   - BP elevated not severe range   - Denies s/s PreE   - PreE labs wnl, P/C 0.14 on 20   - Follow up in 1 week for BP check     Patient Active Problem List    Diagnosis Date Noted    Gestational hypertension (G1) 2020    S/P primary low transverse      37 weeks gestation of pregnancy 2020    HRP (high risk pregnancy) 2020    PLTCS 20 F Apg  Wt 5#14 2020    Dysmenorrhea 2020     Desires Mirena IUD PP      Anemia 2020     Hgb 9.4 ()      Susceptible to varicella (non-immune), currently pregnant 2020     Varicella PP      Hx of headaches 2020    Nabothian cysts 2020     Noted on pelvic exam at initial exam on 3/17/20      Hx of blood transfusion () 2020     Due to GI bleed secondary to Motrin use      Family history of sickle cell trait (Pt negative) 2020     Maunt       Hx of GI bleed (2018) 2018     Due Motrin use. Pt received blood transfusion        Return in about 1 week (around 2020) for BP check, also needs scheduled for IUD placement at 6 week visit . Counseling Completed:  · Signs & Symptoms of mastitis and when to notify office discussed.   · Secondary smoke risks including increased risks of respiratory problems, Sudden infant death syndrome, and potential malignancies discussed  · Abstinence, family planning counseling and STD counseling discussed  · Continue with post operative restrictions until 6 weeks

## 2020-09-16 ENCOUNTER — NURSE ONLY (OUTPATIENT)
Dept: OBGYN | Age: 25
End: 2020-09-16
Payer: COMMERCIAL

## 2020-09-16 VITALS
BODY MASS INDEX: 29.66 KG/M2 | WEIGHT: 172.8 LBS | DIASTOLIC BLOOD PRESSURE: 83 MMHG | HEART RATE: 86 BPM | SYSTOLIC BLOOD PRESSURE: 129 MMHG

## 2020-09-16 PROCEDURE — 99211 OFF/OP EST MAY X REQ PHY/QHP: CPT | Performed by: OBSTETRICS & GYNECOLOGY

## 2020-09-16 PROCEDURE — 99024 POSTOP FOLLOW-UP VISIT: CPT | Performed by: OBSTETRICS & GYNECOLOGY

## 2020-09-16 NOTE — PROGRESS NOTES
Patient delivered 9/2/20 and is here today for a post partum bp check. Patient's bp was 129/83 pulse 86. She has no complaints today. Patient will return for PP appt and IUD insertion on 10/2/20.

## 2020-10-04 PROBLEM — Z98.890 POST-OPERATIVE STATE: Status: RESOLVED | Noted: 2020-09-02 | Resolved: 2020-10-04

## 2020-10-21 ENCOUNTER — OFFICE VISIT (OUTPATIENT)
Dept: INTERNAL MEDICINE | Age: 25
End: 2020-10-21
Payer: COMMERCIAL

## 2020-10-21 ENCOUNTER — PROCEDURE VISIT (OUTPATIENT)
Dept: OBGYN | Age: 25
End: 2020-10-21
Payer: COMMERCIAL

## 2020-10-21 VITALS
SYSTOLIC BLOOD PRESSURE: 133 MMHG | DIASTOLIC BLOOD PRESSURE: 91 MMHG | HEART RATE: 86 BPM | WEIGHT: 169.13 LBS | BODY MASS INDEX: 28.88 KG/M2 | HEIGHT: 64 IN

## 2020-10-21 VITALS
SYSTOLIC BLOOD PRESSURE: 132 MMHG | HEIGHT: 64 IN | DIASTOLIC BLOOD PRESSURE: 93 MMHG | WEIGHT: 170 LBS | BODY MASS INDEX: 29.02 KG/M2 | HEART RATE: 68 BPM

## 2020-10-21 PROBLEM — O09.90 HRP (HIGH RISK PREGNANCY): Status: RESOLVED | Noted: 2020-09-02 | Resolved: 2020-10-21

## 2020-10-21 PROBLEM — N94.6 DYSMENORRHEA: Status: RESOLVED | Noted: 2020-08-11 | Resolved: 2020-10-21

## 2020-10-21 PROBLEM — Z30.430 ENCOUNTER FOR IUD INSERTION: Status: ACTIVE | Noted: 2020-10-21

## 2020-10-21 PROBLEM — D50.0 NORMOCYTIC ANEMIA DUE TO BLOOD LOSS: Status: ACTIVE | Noted: 2020-06-27

## 2020-10-21 PROBLEM — Z28.39 SUSCEPTIBLE TO VARICELLA (NON-IMMUNE), CURRENTLY PREGNANT: Status: RESOLVED | Noted: 2020-03-18 | Resolved: 2020-10-21

## 2020-10-21 PROBLEM — O09.899 SUSCEPTIBLE TO VARICELLA (NON-IMMUNE), CURRENTLY PREGNANT: Status: RESOLVED | Noted: 2020-03-18 | Resolved: 2020-10-21

## 2020-10-21 PROBLEM — Z3A.37 37 WEEKS GESTATION OF PREGNANCY: Status: RESOLVED | Noted: 2020-09-02 | Resolved: 2020-10-21

## 2020-10-21 PROBLEM — Z87.898 HX OF HEADACHE: Status: RESOLVED | Noted: 2020-03-17 | Resolved: 2020-10-21

## 2020-10-21 LAB
CONTROL: PRESENT
PREGNANCY TEST URINE, POC: NEGATIVE

## 2020-10-21 PROCEDURE — 81025 URINE PREGNANCY TEST: CPT | Performed by: STUDENT IN AN ORGANIZED HEALTH CARE EDUCATION/TRAINING PROGRAM

## 2020-10-21 PROCEDURE — 1036F TOBACCO NON-USER: CPT | Performed by: STUDENT IN AN ORGANIZED HEALTH CARE EDUCATION/TRAINING PROGRAM

## 2020-10-21 PROCEDURE — 11981 INSERTION DRUG DLVR IMPLANT: CPT | Performed by: STUDENT IN AN ORGANIZED HEALTH CARE EDUCATION/TRAINING PROGRAM

## 2020-10-21 PROCEDURE — G8427 DOCREV CUR MEDS BY ELIG CLIN: HCPCS | Performed by: STUDENT IN AN ORGANIZED HEALTH CARE EDUCATION/TRAINING PROGRAM

## 2020-10-21 PROCEDURE — 99211 OFF/OP EST MAY X REQ PHY/QHP: CPT | Performed by: STUDENT IN AN ORGANIZED HEALTH CARE EDUCATION/TRAINING PROGRAM

## 2020-10-21 PROCEDURE — G8484 FLU IMMUNIZE NO ADMIN: HCPCS | Performed by: STUDENT IN AN ORGANIZED HEALTH CARE EDUCATION/TRAINING PROGRAM

## 2020-10-21 PROCEDURE — 99203 OFFICE O/P NEW LOW 30 MIN: CPT | Performed by: STUDENT IN AN ORGANIZED HEALTH CARE EDUCATION/TRAINING PROGRAM

## 2020-10-21 PROCEDURE — G8417 CALC BMI ABV UP PARAM F/U: HCPCS | Performed by: STUDENT IN AN ORGANIZED HEALTH CARE EDUCATION/TRAINING PROGRAM

## 2020-10-21 PROCEDURE — 6360000002 HC RX W HCPCS

## 2020-10-21 RX ORDER — BLOOD PRESSURE TEST KIT
1 KIT MISCELLANEOUS 2 TIMES DAILY
Qty: 1 KIT | Refills: 0 | Status: SHIPPED | OUTPATIENT
Start: 2020-10-21 | End: 2021-06-03

## 2020-10-21 NOTE — PROGRESS NOTES
[facility] OB/GYN   IUD insertion Procedure Note    Meg Main  10/21/2020                       Primary Care Physician: Divine Tyler MD      Subjective:   Meg Main 21 y.o. female Evorn Essex is here for previously scheduled IUD insertion due to history of dysmenorrhea. Patient's last menstrual period was 10/05/2020 (exact date). . She delivered from a PLTCS on 6/[birth year], complicated by gHTN. She has no complaints today. She is known to have painful menses that interfere with her ADLs. She has tried NSAIDS in the past without success. She wishes to continue to utilize barrier contraception for family planning and STD precautions. The side affect profile of the IUD was reviewed. All other forms of family planning and options for treatment of her dysmennorhea were reviewed with the patient. IUD was inserted: Lot [id number] Expires January 2023. She reports she is doing well from a postpartum standpoint. She denies any HA, VC, RUQ pain, chest pain, or SOB. Her lochia is light. She was breast feeding but is now bottle feeding and denies s/s mastitis. The patient denies s/s of postpartum depression. She denies any SI/HI. Her bowels are regular and denies any urinary complaints. The patient denies use of tobacco products. The patient denies any family member or herself as having a blood clot in their leg, lung, brain or that any member of her family has had a sudden cardiac death under the age of 35 yo. Vitals:   Blood pressure (!) 133/91, pulse 86, height 5' 4\" (1.626 m), weight 169 lb 2 oz (76.7 kg), last menstrual period 10/05/2020, not currently breastfeeding. Lab:  Pregnancy Test:  Lab Results   Component Value Date    PREGTESTUR negative 10/21/2020    HCG POSITIVE (A) 01/26/2020    HCGQUANT 15,831 (H) 01/26/2020     Cultures:  Cultures were negative on 9/2/2020    Procedure: Insertion of  IUD    Indications: dysmenorrhea     Lot #: [id number]  Expiration date: January 2023.     Procedure Details:

## 2020-10-21 NOTE — PATIENT INSTRUCTIONS
Labs mailed to patient, they will have them done before their next visit. Printed script for BP monitor signed and mailed to the patient    Patient was put on a wait list and will be contacted to schedule their next follow up appointment once the schedule is available. If the patient is in need of an appointment before their next visit please call the office at [phone]. After Visit Summary  mailed to patient.     PAT

## 2020-10-27 ENCOUNTER — HOSPITAL ENCOUNTER (EMERGENCY)
Age: 25
Discharge: HOME | End: 2020-10-27
Attending: EMERGENCY MEDICINE
Payer: COMMERCIAL

## 2020-10-27 ENCOUNTER — HOSPITAL ENCOUNTER (INPATIENT)
Age: 25
LOS: 1 days | Discharge: HOME | DRG: 872 | End: 2020-10-29
Attending: EMERGENCY MEDICINE | Admitting: FAMILY MEDICINE
Payer: COMMERCIAL

## 2020-10-27 ENCOUNTER — APPOINTMENT (OUTPATIENT)
Dept: GENERAL RADIOLOGY | Age: 25
DRG: 872 | End: 2020-10-27
Payer: COMMERCIAL

## 2020-10-27 ENCOUNTER — APPOINTMENT (OUTPATIENT)
Dept: GENERAL RADIOLOGY | Age: 25
End: 2020-10-27
Payer: COMMERCIAL

## 2020-10-27 VITALS
OXYGEN SATURATION: 97 % | DIASTOLIC BLOOD PRESSURE: 68 MMHG | TEMPERATURE: 98.1 F | RESPIRATION RATE: 16 BRPM | SYSTOLIC BLOOD PRESSURE: 116 MMHG | HEART RATE: 63 BPM

## 2020-10-27 LAB — LACTIC ACID, WHOLE BLOOD: 2.5 MMOL/L (ref 0.7–2.1)

## 2020-10-27 PROCEDURE — 83605 ASSAY OF LACTIC ACID: CPT

## 2020-10-27 PROCEDURE — 96372 THER/PROPH/DIAG INJ SC/IM: CPT

## 2020-10-27 PROCEDURE — 99285 EMERGENCY DEPT VISIT HI MDM: CPT

## 2020-10-27 PROCEDURE — 85025 COMPLETE CBC W/AUTO DIFF WBC: CPT

## 2020-10-27 PROCEDURE — 87040 BLOOD CULTURE FOR BACTERIA: CPT

## 2020-10-27 PROCEDURE — 84703 CHORIONIC GONADOTROPIN ASSAY: CPT

## 2020-10-27 PROCEDURE — 83690 ASSAY OF LIPASE: CPT

## 2020-10-27 PROCEDURE — 6360000002 HC RX W HCPCS: Performed by: NURSE PRACTITIONER

## 2020-10-27 PROCEDURE — 2500000003 HC RX 250 WO HCPCS: Performed by: EMERGENCY MEDICINE

## 2020-10-27 PROCEDURE — 85730 THROMBOPLASTIN TIME PARTIAL: CPT

## 2020-10-27 PROCEDURE — 2580000003 HC RX 258: Performed by: EMERGENCY MEDICINE

## 2020-10-27 PROCEDURE — 6360000002 HC RX W HCPCS: Performed by: EMERGENCY MEDICINE

## 2020-10-27 PROCEDURE — 80053 COMPREHEN METABOLIC PANEL: CPT

## 2020-10-27 PROCEDURE — 87804 INFLUENZA ASSAY W/OPTIC: CPT

## 2020-10-27 PROCEDURE — 96375 TX/PRO/DX INJ NEW DRUG ADDON: CPT

## 2020-10-27 PROCEDURE — 74019 RADEX ABDOMEN 2 VIEWS: CPT

## 2020-10-27 PROCEDURE — 71045 X-RAY EXAM CHEST 1 VIEW: CPT

## 2020-10-27 PROCEDURE — 85610 PROTHROMBIN TIME: CPT

## 2020-10-27 PROCEDURE — 99283 EMERGENCY DEPT VISIT LOW MDM: CPT

## 2020-10-27 PROCEDURE — 6370000000 HC RX 637 (ALT 250 FOR IP): Performed by: EMERGENCY MEDICINE

## 2020-10-27 RX ORDER — ONDANSETRON 2 MG/ML
4 INJECTION INTRAMUSCULAR; INTRAVENOUS ONCE
Status: COMPLETED | OUTPATIENT
Start: 2020-10-27 | End: 2020-10-27

## 2020-10-27 RX ORDER — ACETAMINOPHEN 500 MG
1000 TABLET ORAL ONCE
Status: COMPLETED | OUTPATIENT
Start: 2020-10-27 | End: 2020-10-27

## 2020-10-27 RX ORDER — KETOROLAC TROMETHAMINE 30 MG/ML
30 INJECTION, SOLUTION INTRAMUSCULAR; INTRAVENOUS ONCE
Status: COMPLETED | OUTPATIENT
Start: 2020-10-27 | End: 2020-10-27

## 2020-10-27 RX ORDER — SODIUM CHLORIDE, SODIUM LACTATE, POTASSIUM CHLORIDE, AND CALCIUM CHLORIDE .6; .31; .03; .02 G/100ML; G/100ML; G/100ML; G/100ML
1000 INJECTION, SOLUTION INTRAVENOUS ONCE
Status: COMPLETED | OUTPATIENT
Start: 2020-10-27 | End: 2020-10-28

## 2020-10-27 RX ADMIN — KETOROLAC TROMETHAMINE 30 MG: 30 INJECTION, SOLUTION INTRAMUSCULAR at 16:22

## 2020-10-27 RX ADMIN — ONDANSETRON 4 MG: 2 INJECTION INTRAMUSCULAR; INTRAVENOUS at 23:32

## 2020-10-27 RX ADMIN — FAMOTIDINE 20 MG: 10 INJECTION INTRAVENOUS at 23:32

## 2020-10-27 RX ADMIN — SODIUM CHLORIDE, POTASSIUM CHLORIDE, SODIUM LACTATE AND CALCIUM CHLORIDE 1000 ML: 600; 310; 30; 20 INJECTION, SOLUTION INTRAVENOUS at 23:35

## 2020-10-27 RX ADMIN — ACETAMINOPHEN 1000 MG: 500 TABLET ORAL at 23:32

## 2020-10-27 ASSESSMENT — ENCOUNTER SYMPTOMS
VOMITING: 0
SHORTNESS OF BREATH: 0
CONSTIPATION: 0
DIARRHEA: 0
RHINORRHEA: 0
NAUSEA: 1
SORE THROAT: 0
EYE PAIN: 0
NAUSEA: 0
VOMITING: 0
BACK PAIN: 1
ABDOMINAL PAIN: 1
COUGH: 0
COUGH: 0
DIARRHEA: 0
BACK PAIN: 0
SHORTNESS OF BREATH: 0
ABDOMINAL PAIN: 1

## 2020-10-27 ASSESSMENT — PAIN DESCRIPTION - LOCATION: LOCATION: ABDOMEN

## 2020-10-27 ASSESSMENT — PAIN DESCRIPTION - PAIN TYPE: TYPE: ACUTE PAIN

## 2020-10-27 ASSESSMENT — PAIN SCALES - GENERAL
PAINLEVEL_OUTOF10: 7
PAINLEVEL_OUTOF10: 10
PAINLEVEL_OUTOF10: 10

## 2020-10-27 NOTE — ED PROVIDER NOTES
The patient was seen and examined by me in conjunction with the mid-level provider. I agree with his/her assessment and treatment plan. X-ray shows that she is constipated. Findings were discussed with the patient.      Jay Reinoso MD  10/27/20 9390

## 2020-10-27 NOTE — ED PROVIDER NOTES
[facility] ED  eMERGENCY dEPARTMENT eNCOUnter      Pt Name: Josh Tenorio  MRN: [id number]  [place] 1995  Date of evaluation: 10/27/2020  Provider: NIEVES Faulkner CNP    CHIEF COMPLAINT       Chief Complaint   Patient presents with    Abdominal Pain         HISTORY OF PRESENT ILLNESS  (Location/Symptom, Timing/Onset, Context/Setting, Quality, Duration, Modifying Factors, Severity.)   Josh Tenorio is a 21 y.o. female who presents to the emergency department via private auto for low abdominal cramping. Onset was after IUD placement on 10/21/2020. She is having some vaginal bleeding today. Denies fever, chills, urinary symptoms, N/V/D, back pain. Rates her pain 10 at this time. Nursing Notes were reviewed. ALLERGIES     Motrin [ibuprofen]    CURRENT MEDICATIONS       Discharge Medication List as of 10/27/2020  4:18 PM      CONTINUE these medications which have NOT CHANGED    Details   Blood Pressure KIT 2 TIMES DAILY Starting Wed 10/21/2020, Disp-1 kit,R-0, Print      ferrous sulfate (FE TABS) 325 (65 Fe) MG EC tablet Take 1 tablet by mouth 2 times daily, Disp-90 tablet,R-0Print             PAST MEDICAL HISTORY         Diagnosis Date    Gestational hypertension (G1) 2020    History of blood transfusion     From Motrin use     Menometrorrhagia 5/10/2018    Symptomatic anemia 2018    From Motrin use     Vision abnormalities        SURGICAL HISTORY           Procedure Laterality Date     SECTION N/A 2020     SECTION performed by Halima Cervantes DO at [facility] L[facility]D OR    COLONOSCOPY  2018    No lesions seen    NC COLON CA SCRN NOT HI RSK IND N/A 2018    COLONOSCOPY performed by Vidhya Mendoza MD at [address] [place] [address]  2018    No lesions seen up to the 2nd part of the duodenum.     UPPER GASTROINTESTINAL ENDOSCOPY N/A 2018    EGD DIAGNOSTIC ONLY performed by Vidhya Mendoza MD at General: She is not in acute distress. Appearance: She is well-developed. She is not diaphoretic. Eyes:      General: No scleral icterus. Conjunctiva/sclera: Conjunctivae normal.   Cardiovascular:      Rate and Rhythm: Normal rate and regular rhythm. Pulmonary:      Effort: Pulmonary effort is normal. No respiratory distress. Abdominal:      General: There is no distension. Palpations: Abdomen is soft. Tenderness: There is no abdominal tenderness. Genitourinary:     Labia:         Right: No rash, tenderness or lesion. Left: No rash, tenderness or lesion. Vagina: No signs of injury and foreign body. Bleeding (mild) present. No vaginal discharge or erythema. Cervix: No cervical motion tenderness. Comments: IUD strings noted. Skin:     General: Skin is warm and dry. Findings: No rash. Neurological:      Mental Status: She is alert and oriented to person, place, and time. Psychiatric:         Behavior: Behavior normal.         DIAGNOSTIC RESULTS       RADIOLOGY:   Non-plain film images such as CT, Ultrasound and MRI are read by the radiologist. [facility] radiographic images are visualized and preliminarily interpreted by the emergency physician with the below findings:    Interpretation per the Radiologist below, if available at the time of this note:    Xr Abdomen (2 Views)    Result Date: 10/27/2020  EXAMINATION: TWO XRAY VIEWS OF THE ABDOMEN 10/27/2020 2:55 pm COMPARISON: None. HISTORY: ORDERING SYSTEM PROVIDED HISTORY: low abd pain, recent IUD insertion TECHNOLOGIST PROVIDED HISTORY: low abd pain, recent IUD insertion Reason for Exam: IUD placement last Wednesday, abdominal cramps since placement Acuity: Unknown Type of Exam: Unknown FINDINGS: Lung bases are clear. IUD projects over the pelvis. Large stool volume with mild gaseous distention of the colon. No abnormally dilated loops of small bowel. Large stool volume.          EMERGENCY DEPARTMENT COURSE and DIFFERENTIAL DIAGNOSIS/MDM:   Vitals:    Vitals:    10/27/20 1421   BP: 116/68   Pulse: 63   Resp: 16   Temp: 98.1 °F (36.7 °C)   TempSrc: Oral   SpO2: 97%       MEDICATIONS GIVEN IN THE ED:  Medications   ketorolac (TORADOL) injection 30 mg (30 mg Intramuscular Given 10/27/20 1622)       CLINICAL DECISION MAKING:  The patient presented alert with a nontoxic appearance and was seen in conjunction with Dr. Héctor Levi. Imaging showed constipation. Follow up with pcp and OB/GYN, return to ED if condition worsens. FINAL IMPRESSION      1.  Constipation, unspecified constipation type            Problem List  Patient Active Problem List   Diagnosis Code    Hx of GI bleed (2018) Z87.19    Hx of blood transfusion (2018) Z92.89    Family history of sickle cell trait (Pt negative) Z83.2    Nabothian cysts N88.8    Normocytic anemia due to blood loss D50.0    S/P primary low transverse  N10.639    Gestational hypertension (G1) O13.9    Encounter for IUD insertion Z30.430         DISPOSITION/PLAN   DISPOSITION Decision To Discharge 10/27/2020 04:15:12 PM      PATIENT REFERRED TO:   Constance Jacobs MD  [address] [phone]      As needed    [facility] ED  [address] [facility]  [phone]    If symptoms worsen      DISCHARGE MEDICATIONS:     Discharge Medication List as of 10/27/2020  4:18 PM              (Please note that portions of this note were completed with a voice recognition program.  Efforts were made to edit the dictations but occasionally words are mis-transcribed.)    NIEVES Brush CNP, APRN - CNP  10/27/20 4263

## 2020-10-28 ENCOUNTER — APPOINTMENT (OUTPATIENT)
Dept: CT IMAGING | Age: 25
DRG: 872 | End: 2020-10-28
Payer: COMMERCIAL

## 2020-10-28 PROBLEM — E87.6 HYPOKALEMIA: Status: ACTIVE | Noted: 2020-10-28

## 2020-10-28 PROBLEM — A41.9 SEPSIS WITHOUT ACUTE ORGAN DYSFUNCTION (HCC): Status: ACTIVE | Noted: 2020-10-28

## 2020-10-28 PROBLEM — K52.9 ENTEROCOLITIS: Status: ACTIVE | Noted: 2020-10-28

## 2020-10-28 LAB
-: ABNORMAL
ABSOLUTE EOS #: 0 K/UL (ref 0–0.4)
ABSOLUTE IMMATURE GRANULOCYTE: 0 K/UL (ref 0–0.3)
ABSOLUTE LYMPH #: 0.54 K/UL (ref 1–4.8)
ABSOLUTE MONO #: 0.41 K/UL (ref 0.1–0.8)
ALBUMIN SERPL-MCNC: 4.1 G/DL (ref 3.5–5.2)
ALBUMIN/GLOBULIN RATIO: 1.1 (ref 1–2.5)
ALP BLD-CCNC: 79 U/L (ref 35–104)
ALT SERPL-CCNC: 7 U/L (ref 5–33)
AMORPHOUS: ABNORMAL
ANION GAP SERPL CALCULATED.3IONS-SCNC: 13 MMOL/L (ref 9–17)
AST SERPL-CCNC: 15 U/L
BACTERIA: ABNORMAL
BASOPHILS # BLD: 1 % (ref 0–2)
BASOPHILS ABSOLUTE: 0.14 K/UL (ref 0–0.2)
BILIRUB SERPL-MCNC: 0.38 MG/DL (ref 0.3–1.2)
BILIRUBIN URINE: NEGATIVE
BUN BLDV-MCNC: 10 MG/DL (ref 6–20)
BUN/CREAT BLD: ABNORMAL (ref 9–20)
CALCIUM SERPL-MCNC: 9.3 MG/DL (ref 8.6–10.4)
CASTS UA: ABNORMAL /LPF (ref 0–8)
CHLORIDE BLD-SCNC: 100 MMOL/L (ref 98–107)
CO2: 23 MMOL/L (ref 20–31)
COLOR: YELLOW
CREAT SERPL-MCNC: 0.54 MG/DL (ref 0.5–0.9)
CRYSTALS, UA: ABNORMAL /HPF
CULTURE: NO GROWTH
DIFFERENTIAL TYPE: ABNORMAL
DIRECT EXAM: NORMAL
EOSINOPHILS RELATIVE PERCENT: 0 % (ref 1–4)
EPITHELIAL CELLS UA: ABNORMAL /HPF (ref 0–5)
GFR AFRICAN AMERICAN: >60 ML/MIN
GFR NON-AFRICAN AMERICAN: >60 ML/MIN
GFR SERPL CREATININE-BSD FRML MDRD: ABNORMAL ML/MIN/{1.73_M2}
GFR SERPL CREATININE-BSD FRML MDRD: ABNORMAL ML/MIN/{1.73_M2}
GLUCOSE BLD-MCNC: 149 MG/DL (ref 70–99)
GLUCOSE URINE: NEGATIVE
HCG QUALITATIVE: NEGATIVE
HCT VFR BLD CALC: 32.7 % (ref 36.3–47.1)
HEMOGLOBIN: 10.2 G/DL (ref 11.9–15.1)
IMMATURE GRANULOCYTES: 0 %
INR BLD: 1.1
KETONES, URINE: NEGATIVE
LACTIC ACID, SEPSIS WHOLE BLOOD: 1.1 MMOL/L (ref 0.5–1.9)
LACTIC ACID, SEPSIS: NORMAL MMOL/L (ref 0.5–1.9)
LACTIC ACID, WHOLE BLOOD: 1.2 MMOL/L (ref 0.7–2.1)
LEUKOCYTE ESTERASE, URINE: ABNORMAL
LIPASE: 13 U/L (ref 13–60)
LYMPHOCYTES # BLD: 4 % (ref 24–44)
Lab: NORMAL
MCH RBC QN AUTO: 28.3 PG (ref 25.2–33.5)
MCHC RBC AUTO-ENTMCNC: 31.2 G/DL (ref 28.4–34.8)
MCV RBC AUTO: 90.6 FL (ref 82.6–102.9)
MONOCYTES # BLD: 3 % (ref 1–7)
MORPHOLOGY: NORMAL
MUCUS: ABNORMAL
NITRITE, URINE: NEGATIVE
NRBC AUTOMATED: 0 PER 100 WBC
OTHER OBSERVATIONS UA: ABNORMAL
PARTIAL THROMBOPLASTIN TIME: 29.1 SEC (ref 20.5–30.5)
PDW BLD-RTO: 14.2 % (ref 11.8–14.4)
PH UA: 5.5 (ref 5–8)
PLATELET # BLD: 336 K/UL (ref 138–453)
PLATELET ESTIMATE: ABNORMAL
PMV BLD AUTO: 10.6 FL (ref 8.1–13.5)
POTASSIUM SERPL-SCNC: 3.4 MMOL/L (ref 3.7–5.3)
PROCALCITONIN: 6.23 NG/ML
PROTEIN UA: NEGATIVE
PROTHROMBIN TIME: 11.2 SEC (ref 9–12)
RBC # BLD: 3.61 M/UL (ref 3.95–5.11)
RBC # BLD: ABNORMAL 10*6/UL
RBC UA: ABNORMAL /HPF (ref 0–4)
RENAL EPITHELIAL, UA: ABNORMAL /HPF
SARS-COV-2, RAPID: NOT DETECTED
SARS-COV-2: NORMAL
SARS-COV-2: NORMAL
SEG NEUTROPHILS: 92 % (ref 36–66)
SEGMENTED NEUTROPHILS ABSOLUTE COUNT: 12.41 K/UL (ref 1.8–7.7)
SODIUM BLD-SCNC: 136 MMOL/L (ref 135–144)
SOURCE: NORMAL
SPECIFIC GRAVITY UA: 1.01 (ref 1–1.03)
SPECIMEN DESCRIPTION: NORMAL
TOTAL PROTEIN: 7.8 G/DL (ref 6.4–8.3)
TRICHOMONAS: ABNORMAL
TURBIDITY: CLEAR
URINE HGB: ABNORMAL
UROBILINOGEN, URINE: NORMAL
WBC # BLD: 13.5 K/UL (ref 3.5–11.3)
WBC # BLD: ABNORMAL 10*3/UL
WBC UA: ABNORMAL /HPF (ref 0–5)
YEAST: ABNORMAL

## 2020-10-28 PROCEDURE — 1200000000 HC SEMI PRIVATE

## 2020-10-28 PROCEDURE — 2500000003 HC RX 250 WO HCPCS: Performed by: EMERGENCY MEDICINE

## 2020-10-28 PROCEDURE — 83605 ASSAY OF LACTIC ACID: CPT

## 2020-10-28 PROCEDURE — U0002 COVID-19 LAB TEST NON-CDC: HCPCS

## 2020-10-28 PROCEDURE — 87510 GARDNER VAG DNA DIR PROBE: CPT

## 2020-10-28 PROCEDURE — 6360000004 HC RX CONTRAST MEDICATION: Performed by: EMERGENCY MEDICINE

## 2020-10-28 PROCEDURE — 36415 COLL VENOUS BLD VENIPUNCTURE: CPT

## 2020-10-28 PROCEDURE — 6360000002 HC RX W HCPCS: Performed by: EMERGENCY MEDICINE

## 2020-10-28 PROCEDURE — 99223 1ST HOSP IP/OBS HIGH 75: CPT | Performed by: FAMILY MEDICINE

## 2020-10-28 PROCEDURE — 74177 CT ABD & PELVIS W/CONTRAST: CPT

## 2020-10-28 PROCEDURE — 87660 TRICHOMONAS VAGIN DIR PROBE: CPT

## 2020-10-28 PROCEDURE — 81001 URINALYSIS AUTO W/SCOPE: CPT

## 2020-10-28 PROCEDURE — 2580000003 HC RX 258: Performed by: NURSE PRACTITIONER

## 2020-10-28 PROCEDURE — APPSS45 APP SPLIT SHARED TIME 31-45 MINUTES: Performed by: PHYSICIAN ASSISTANT

## 2020-10-28 PROCEDURE — 84145 PROCALCITONIN (PCT): CPT

## 2020-10-28 PROCEDURE — 6360000002 HC RX W HCPCS: Performed by: PHYSICIAN ASSISTANT

## 2020-10-28 PROCEDURE — 87480 CANDIDA DNA DIR PROBE: CPT

## 2020-10-28 PROCEDURE — 87086 URINE CULTURE/COLONY COUNT: CPT

## 2020-10-28 PROCEDURE — 2500000003 HC RX 250 WO HCPCS: Performed by: NURSE PRACTITIONER

## 2020-10-28 PROCEDURE — 87491 CHLMYD TRACH DNA AMP PROBE: CPT

## 2020-10-28 PROCEDURE — 87591 N.GONORRHOEAE DNA AMP PROB: CPT

## 2020-10-28 PROCEDURE — 6370000000 HC RX 637 (ALT 250 FOR IP): Performed by: NURSE PRACTITIONER

## 2020-10-28 PROCEDURE — 2580000003 HC RX 258: Performed by: PHYSICIAN ASSISTANT

## 2020-10-28 PROCEDURE — 96365 THER/PROPH/DIAG IV INF INIT: CPT

## 2020-10-28 RX ORDER — ONDANSETRON 2 MG/ML
4 INJECTION INTRAMUSCULAR; INTRAVENOUS EVERY 6 HOURS PRN
Status: DISCONTINUED | OUTPATIENT
Start: 2020-10-28 | End: 2020-10-29 | Stop reason: HOSPADM

## 2020-10-28 RX ORDER — CIPROFLOXACIN 2 MG/ML
400 INJECTION, SOLUTION INTRAVENOUS ONCE
Status: COMPLETED | OUTPATIENT
Start: 2020-10-28 | End: 2020-10-28

## 2020-10-28 RX ORDER — CIPROFLOXACIN 2 MG/ML
400 INJECTION, SOLUTION INTRAVENOUS EVERY 12 HOURS
Status: DISCONTINUED | OUTPATIENT
Start: 2020-10-28 | End: 2020-10-28

## 2020-10-28 RX ORDER — POLYETHYLENE GLYCOL 3350 17 G/17G
17 POWDER, FOR SOLUTION ORAL DAILY PRN
Status: DISCONTINUED | OUTPATIENT
Start: 2020-10-28 | End: 2020-10-29 | Stop reason: HOSPADM

## 2020-10-28 RX ORDER — LANOLIN ALCOHOL/MO/W.PET/CERES
325 CREAM (GRAM) TOPICAL 2 TIMES DAILY
Status: DISCONTINUED | OUTPATIENT
Start: 2020-10-28 | End: 2020-10-29 | Stop reason: HOSPADM

## 2020-10-28 RX ORDER — MAGNESIUM SULFATE 1 G/100ML
1 INJECTION INTRAVENOUS PRN
Status: DISCONTINUED | OUTPATIENT
Start: 2020-10-28 | End: 2020-10-29 | Stop reason: HOSPADM

## 2020-10-28 RX ORDER — NICOTINE 21 MG/24HR
1 PATCH, TRANSDERMAL 24 HOURS TRANSDERMAL DAILY PRN
Status: DISCONTINUED | OUTPATIENT
Start: 2020-10-28 | End: 2020-10-29 | Stop reason: HOSPADM

## 2020-10-28 RX ORDER — ACETAMINOPHEN 650 MG/1
650 SUPPOSITORY RECTAL EVERY 6 HOURS PRN
Status: DISCONTINUED | OUTPATIENT
Start: 2020-10-28 | End: 2020-10-29 | Stop reason: HOSPADM

## 2020-10-28 RX ORDER — POTASSIUM CHLORIDE 20 MEQ/1
40 TABLET, EXTENDED RELEASE ORAL PRN
Status: DISCONTINUED | OUTPATIENT
Start: 2020-10-28 | End: 2020-10-29 | Stop reason: HOSPADM

## 2020-10-28 RX ORDER — MORPHINE SULFATE 2 MG/ML
2 INJECTION, SOLUTION INTRAMUSCULAR; INTRAVENOUS EVERY 4 HOURS PRN
Status: DISCONTINUED | OUTPATIENT
Start: 2020-10-28 | End: 2020-10-29 | Stop reason: HOSPADM

## 2020-10-28 RX ORDER — SODIUM CHLORIDE 0.9 % (FLUSH) 0.9 %
10 SYRINGE (ML) INJECTION PRN
Status: DISCONTINUED | OUTPATIENT
Start: 2020-10-28 | End: 2020-10-29 | Stop reason: HOSPADM

## 2020-10-28 RX ORDER — POTASSIUM CHLORIDE 7.45 MG/ML
10 INJECTION INTRAVENOUS PRN
Status: DISCONTINUED | OUTPATIENT
Start: 2020-10-28 | End: 2020-10-29 | Stop reason: HOSPADM

## 2020-10-28 RX ORDER — DEXTROSE, SODIUM CHLORIDE, AND POTASSIUM CHLORIDE 5; .45; .15 G/100ML; G/100ML; G/100ML
INJECTION INTRAVENOUS CONTINUOUS
Status: DISCONTINUED | OUTPATIENT
Start: 2020-10-28 | End: 2020-10-28

## 2020-10-28 RX ORDER — SODIUM CHLORIDE 0.9 % (FLUSH) 0.9 %
10 SYRINGE (ML) INJECTION EVERY 12 HOURS SCHEDULED
Status: DISCONTINUED | OUTPATIENT
Start: 2020-10-28 | End: 2020-10-29 | Stop reason: HOSPADM

## 2020-10-28 RX ORDER — ACETAMINOPHEN 325 MG/1
650 TABLET ORAL EVERY 6 HOURS PRN
Status: DISCONTINUED | OUTPATIENT
Start: 2020-10-28 | End: 2020-10-29 | Stop reason: HOSPADM

## 2020-10-28 RX ORDER — PROMETHAZINE HYDROCHLORIDE 12.5 MG/1
12.5 TABLET ORAL EVERY 6 HOURS PRN
Status: DISCONTINUED | OUTPATIENT
Start: 2020-10-28 | End: 2020-10-29 | Stop reason: HOSPADM

## 2020-10-28 RX ORDER — SODIUM CHLORIDE 9 MG/ML
INJECTION, SOLUTION INTRAVENOUS CONTINUOUS
Status: DISCONTINUED | OUTPATIENT
Start: 2020-10-28 | End: 2020-10-29 | Stop reason: HOSPADM

## 2020-10-28 RX ADMIN — MORPHINE SULFATE 2 MG: 2 INJECTION, SOLUTION INTRAMUSCULAR; INTRAVENOUS at 23:24

## 2020-10-28 RX ADMIN — ACETAMINOPHEN 650 MG: 325 TABLET ORAL at 10:54

## 2020-10-28 RX ADMIN — ACETAMINOPHEN 650 MG: 325 TABLET ORAL at 23:23

## 2020-10-28 RX ADMIN — FAMOTIDINE 20 MG: 10 INJECTION INTRAVENOUS at 20:37

## 2020-10-28 RX ADMIN — CIPROFLOXACIN 400 MG: 2 INJECTION, SOLUTION INTRAVENOUS at 03:14

## 2020-10-28 RX ADMIN — ACETAMINOPHEN 650 MG: 325 TABLET ORAL at 17:24

## 2020-10-28 RX ADMIN — IOPAMIDOL 75 ML: 755 INJECTION, SOLUTION INTRAVENOUS at 02:41

## 2020-10-28 RX ADMIN — FERROUS SULFATE TAB EC 325 MG (65 MG FE EQUIVALENT) 325 MG: 325 (65 FE) TABLET DELAYED RESPONSE at 20:38

## 2020-10-28 RX ADMIN — FAMOTIDINE 20 MG: 10 INJECTION INTRAVENOUS at 12:23

## 2020-10-28 RX ADMIN — Medication 10 ML: at 12:24

## 2020-10-28 RX ADMIN — METRONIDAZOLE 500 MG: 500 INJECTION, SOLUTION INTRAVENOUS at 06:49

## 2020-10-28 RX ADMIN — SODIUM CHLORIDE: 9 INJECTION, SOLUTION INTRAVENOUS at 10:36

## 2020-10-28 RX ADMIN — MORPHINE SULFATE 2 MG: 2 INJECTION, SOLUTION INTRAMUSCULAR; INTRAVENOUS at 11:19

## 2020-10-28 RX ADMIN — AMPICILLIN SODIUM AND SULBACTAM SODIUM 3 G: 2; 1 INJECTION, POWDER, FOR SOLUTION INTRAMUSCULAR; INTRAVENOUS at 13:36

## 2020-10-28 ASSESSMENT — PAIN SCALES - GENERAL
PAINLEVEL_OUTOF10: 9
PAINLEVEL_OUTOF10: 10
PAINLEVEL_OUTOF10: 8
PAINLEVEL_OUTOF10: 8

## 2020-10-28 ASSESSMENT — PAIN DESCRIPTION - ONSET: ONSET: ON-GOING

## 2020-10-28 ASSESSMENT — PAIN DESCRIPTION - FREQUENCY: FREQUENCY: INTERMITTENT

## 2020-10-28 ASSESSMENT — PAIN DESCRIPTION - PAIN TYPE: TYPE: ACUTE PAIN

## 2020-10-28 ASSESSMENT — PAIN DESCRIPTION - LOCATION: LOCATION: GENERALIZED;HEAD

## 2020-10-28 ASSESSMENT — PAIN DESCRIPTION - DESCRIPTORS: DESCRIPTORS: ACHING;HEADACHE

## 2020-10-28 NOTE — ED NOTES
Pt to ED with c/o HA, fever, abdominal pain and nausea for the past few days. Pt states she had an IUD placed last week and a  6 weeks ago, states she has been having trouble with constipation. Pt reports being seen at [facility] for the same [facility] but was discharged with a dx of constipation but was never given a laxative nor did they check her temp during her visit. Pt states she just generally feels bad and she usually does not get sick. Pt placed on BP and O2 monitor, IV placed, blood obtained and sent to lab.           [address] [place] [address]  10/28/20 1965

## 2020-10-28 NOTE — ED PROVIDER NOTES
Faculty Sign-Out Attestation  Handoff taken on the following patient from prior Attending Physician: Ti Padron was available and discussed any additional care issues that arose and coordinated the management plans with the resident(s) caring for the patient during my duty period. Any areas of disagreement with residents documentation of care or procedures are noted on the chart. I was personally present for the key portions of any/all procedures during my duty period. I have documented in the chart those procedures where I was not present during the encarnacion portions.     Wilson, febrile, 1 month post partum, +/- ct abdomen  Possible dc if all -    Virginia Figueroa DO  Attending Physician     Virginia Figueroa DO  10/28/20 0043  hcg-, ct > colitis, > cipro flagyl, will admit for continued symptoms, hr improved, lft stable, plt stable, admitted,      Virginia Figueroa DO  10/28/20 0610

## 2020-10-28 NOTE — PROGRESS NOTES
OB/GYN Resident Interval Note    Consult initially placed to OB/GYN team. Patient is 8 weeks out from  delivery (2020). She was seen for her postpartum visit on 10/21/20 and was doing well from a post operative standpoint and reported that her periods had returned since delivery. She had a Mirena IUD placed at that time. Patient presentation was briefly discussed with ED resident. Patient presents with diffuse abdominal pain, fever, and vaginal bleeding. CT abdomen pelvis showed findings as listed below. IUD is noted to be in place. Pelvis is unremarkable with normal post operative changes and no evidence of abscess formation. CT is significant for fluid filled loops of small bowel and colon suggestive of infections or inflammatory enterocolitis. At this time, very low suspicion of GYN cause of fever or abdominal pain. Suspect that vaginal bleeding can be attributed to patient's period as she is 8 weeks postpartum. Case discussed with Dr. Hyun Reinoso, in house attending physician who is in agreement. Will defer further treatment to ED physicians. EXAMINATION:    CT OF THE ABDOMEN AND PELVIS WITH CONTRAST 10/28/2020 1:45 am         TECHNIQUE:    CT of the abdomen and pelvis was performed with the administration of    intravenous contrast. Multiplanar reformatted images are provided for review. Dose modulation, iterative reconstruction, and/or weight based adjustment of    the mA/kV was utilized to reduce the radiation dose to as low as reasonably    achievable.         COMPARISON:    None.         HISTORY:    ORDERING SYSTEM PROVIDED HISTORY: recent , IUD insertion. Abd pain,    fever, tachycardia    TECHNOLOGIST PROVIDED HISTORY:    IV Only Contrast    recent , IUD insertion. Abd pain, fever, tachycardia    Reason for Exam: recent c section, IUD insertion.  abd pain, fever, tachycardia    Acuity: Acute    Type of Exam: Initial    Additional signs and symptoms: Per Patient - Abdominal am         COMPARISON:    01/17/2019         HISTORY:    ORDERING SYSTEM PROVIDED HISTORY: febrile, tachycardic, septic workup    TECHNOLOGIST PROVIDED HISTORY:    PUI+    febrile, tachycardic, septic workup         FINDINGS:    The mediastinal and cardiac contours are normal.  There are patchy bibasilar    opacities, right greater than left.  There is no pleural effusion or    pneumothorax identified.              Impression    Patchy lower lobe opacities which are nonspecific but could indicate an    atypical bronchopneumonia such as COVID-19 pneumonia.               Sonia Coronel DO  OB/GYN Resident  PGY3  [facility], [address]  10/28/2020, 3:08 AM

## 2020-10-28 NOTE — ED PROVIDER NOTES
other components within normal limits   CULTURE, BLOOD 1   CULTURE, BLOOD 1   CULTURE, URINE   RAPID INFLUENZA A/B ANTIGENS   COMPREHENSIVE METABOLIC PANEL   LIPASE   PROTIME-INR   APTT   HCG, SERUM, QUALITATIVE   URINALYSIS WITH MICROSCOPIC   LACTIC ACID, WHOLE BLOOD         EMERGENCY DEPARTMENT COURSE:     -------------------------  BP: 138/86, Temp: 102.4 °F (39.1 °C), Pulse: 135, Resp: 20      Comments    Fevers chills and viral symptoms  Also is 1 week postpartum with generalized abdominal pain, right flank pain, tachycardic to the 130s and a temp of 102.4    At this point will get infectious work-up, fluid resuscitation, NSAIDs. Work-up is unremarkable and vital signs have resolved may consider discharge this patient after.   Observation otherwise may need imaging and or admission    (Please note that portions of this note were completed with a voice recognition program.  Efforts were made to edit the dictations but occasionally words are mis-transcribed.)      Armas MD  Attending Emergency Physician         Luis Gupta MD  10/28/20 0001

## 2020-10-28 NOTE — CARE COORDINATION
Case Management Initial Discharge Plan  Lalo Mckeon,             Met with:patient to discuss discharge plans. Information verified: address, contacts, phone number, , insurance Yes    Emergency Contact/Next of Kin name & number: Deirdre Morton @ [phone]    PCP: Aidee Campos MD  Date of last visit: 10/21     Insurance Provider: RAMBO    Discharge Planning    Living Arrangements:  Children   Support Systems:  Children, Family Members, Friends/Neighbors    Home has 1 stories  4 stairs to climb to get into front door, none stairs to climb to reach second floor  Location of bedroom/bathroom in home main     Patient able to perform ADL's:Independent    Current Services (outpatient & in home) none  DME equipment: B/P cuff  DME provider: na    Receiving oral anticoagulation therapy? No    If indicated:   Physician managing anticoagulation treatment: na  Where does patient obtain lab work for ATC treatment? na      Potential Assistance Needed:  N/A    Patient agreeable to home care: No  [place] of choice provided:  n/a    Prior SNF/Rehab Placement and Facility: none  Agreeable to SNF/Rehab: No  [place] of choice provided: n/a     Evaluation: n/a    Expected Discharge date:  10/29/20    Patient expects to be discharged to:  home  Follow Up Appointment: Best Day/ Time:      Transportation provider: family   Transportation arrangements needed for discharge: No    Readmission Risk              Risk of Unplanned Readmission:        11             Does patient have a readmission risk score greater than 14?: No  If yes, follow-up appointment must be made within 7 days of discharge.      Goals of Care: Feel better and go home       Discharge Plan: 10/28 10/28 IVF, CLD, Cdiff, plan DC to home independently, est PCP            Electronically signed by Mariaa Pinzon RN on 10/28/20 at 12:34 PM EDT

## 2020-10-28 NOTE — ED PROVIDER NOTES
METABOLIC PANEL    LIPASE    PROTIME-INR    APTT    HCG Qualitative, Serum    Urinalysis with Microscopic    LACTIC ACID, WHOLE BLOOD    LACTIC ACID, WHOLE BLOOD    COVID-19    PPE Instructions    Vaginal exam    Inpatient consult to Hospitalist    Droplet Plus Isolation    Insert peripheral IV    PATIENT STATUS (FROM ED OR OR/PROCEDURAL) Inpatient       MEDICATIONS ORDERED:  Orders Placed This Encounter   Medications    lactated ringers bolus    acetaminophen (TYLENOL) tablet 1,000 mg    ondansetron (ZOFRAN) injection 4 mg    famotidine (PEPCID) injection 20 mg    iopamidol (ISOVUE-370) 76 % injection 75 mL    ciprofloxacin (CIPRO) IVPB 400 mg     Order Specific Question:   Antimicrobial Indications     Answer: Other     Order Specific Question:   Other Abx Indication     Answer:   colitis    metronidazole (FLAGYL) 500 mg in NaCl 100 mL IVPB premix     Order Specific Question:   Antimicrobial Indications     Answer:    Other     Order Specific Question:   Other Abx Indication     Answer:   colitis       DDX:   Septicemia, bacteremia, Covid, meningitis, gastroenteritis, pneumonia, endometritis, pyelonephritis, cystitis    DIAGNOSTIC RESULTS / EMERGENCYDEPARTMENT COURSE / MDM   LABS:  Labs Reviewed   CBC WITH AUTO DIFFERENTIAL - Abnormal; Notable for the following components:       Result Value    WBC 13.5 (*)     RBC 3.61 (*)     Hemoglobin 10.2 (*)     Hematocrit 32.7 (*)     Seg Neutrophils 92 (*)     Lymphocytes 4 (*)     Eosinophils % 0 (*)     Segs Absolute 12.41 (*)     Absolute Lymph # 0.54 (*)     All other components within normal limits   COMPREHENSIVE METABOLIC PANEL - Abnormal; Notable for the following components:    Glucose 149 (*)     Potassium 3.4 (*)     All other components within normal limits   URINALYSIS WITH MICROSCOPIC - Abnormal; Notable for the following components:    Urine Hgb SMALL (*)     Leukocyte Esterase, Urine TRACE (*)     All other components within normal limits   LACTIC ACID, WHOLE BLOOD - Abnormal; Notable for the following components:    Lactic Acid, Whole Blood 2.5 (*)     All other components within normal limits   RAPID INFLUENZA A/B ANTIGENS   VAGINITIS DNA PROBE   CULTURE, BLOOD 1   CULTURE, BLOOD 1   CULTURE, URINE   C.TRACHOMATIS N.GONORRHOEAE DNA   LIPASE   PROTIME-INR   APTT   HCG, SERUM, QUALITATIVE   LACTIC ACID, WHOLE BLOOD   COVID-19       RADIOLOGY:  Ct Abdomen Pelvis W Iv Contrast    Result Date: 10/28/2020  EXAMINATION: CT OF THE ABDOMEN AND PELVIS WITH CONTRAST 10/28/2020 1:45 am TECHNIQUE: CT of the abdomen and pelvis was performed with the administration of intravenous contrast. Multiplanar reformatted images are provided for review. Dose modulation, iterative reconstruction, and/or weight based adjustment of the mA/kV was utilized to reduce the radiation dose to as low as reasonably achievable. COMPARISON: None. HISTORY: ORDERING SYSTEM PROVIDED HISTORY: recent , IUD insertion. Abd pain, fever, tachycardia TECHNOLOGIST PROVIDED HISTORY: IV Only Contrast recent , IUD insertion. Abd pain, fever, tachycardia Reason for Exam: recent c section, IUD insertion. abd pain, fever, tachycardia Acuity: Acute Type of Exam: Initial Additional signs and symptoms: Per Patient - Abdominal pain with Fever (recent ). Relevant Medical/Surgical History: Hx - GI bleed, Blood transfusion. Surgical hx -  FINDINGS: Lower Chest: Mild bibasilar atelectasis/scarring. No definite focal airspace consolidation, pleural effusion or pneumothorax. Base of the heart is mildly prominent without pericardial fluid collection. Organs: The liver, gallbladder, pancreas, and spleen are grossly within normal limits. No focal hepatic mass lesions. No intrahepatic or extrahepatic biliary ductal dilatation GI/Bowel: Small bowel loops and colon are diffusely fluid-filled, suggestive of infectious or inflammatory enterocolitis.   No definite free intraperitoneal air identified. Pelvis: Edema and stranding within the pelvis adjacent to the urine fundus and urinary bladder. No definite drainable fluid collection identified to suggest abscess formation. IUD noted in place. Peritoneum/Retroperitoneum: The adrenal glands are normal in size and configuration bilaterally. Kidneys perfuse and excrete in a symmetric fashion and ureters are not obstructed. Urinary bladder is unremarkable. Bones/Soft Tissues: Osseous structures are intact without evidence for acute fracture or dislocation. No definite lytic or blastic lesions. Overlying soft tissues are unremarkable. Vasculature: The abdominal aorta is normal in caliber. No discrete and aneurysm or dissection. No lymphadenopathy within the abdomen or pelvis. Fluid-filled loops of small bowel and colon suggestive of infectious or inflammatory enterocolitis. No free intraperitoneal air or fluid to suggest viscus perforation. Mild pelvic edema/congestion, likely secondary to provided history of recent  section. IUD noted in place. Xr Chest Portable    Result Date: 10/28/2020  EXAMINATION: ONE XRAY VIEW OF THE CHEST 10/28/2020 12:29 am COMPARISON: 2019 HISTORY: ORDERING SYSTEM PROVIDED HISTORY: febrile, tachycardic, septic workup TECHNOLOGIST PROVIDED HISTORY: PUI+ febrile, tachycardic, septic workup FINDINGS: The mediastinal and cardiac contours are normal.  There are patchy bibasilar opacities, right greater than left. There is no pleural effusion or pneumothorax identified. Patchy lower lobe opacities which are nonspecific but could indicate an atypical bronchopneumonia such as COVID-19 pneumonia. Xr Abdomen (2 Views)    Result Date: 10/27/2020  EXAMINATION: TWO XRAY VIEWS OF THE ABDOMEN 10/27/2020 2:55 pm COMPARISON: None.  HISTORY: ORDERING SYSTEM PROVIDED HISTORY: low abd pain, recent IUD insertion TECHNOLOGIST PROVIDED HISTORY: low abd pain, recent IUD insertion Reason for Exam: IUD placement last Wednesday, abdominal cramps since placement Acuity: Unknown Type of Exam: Unknown FINDINGS: Lung bases are clear. IUD projects over the pelvis. Large stool volume with mild gaseous distention of the colon. No abnormally dilated loops of small bowel. Large stool volume. EMERGENCY DEPARTMENT COURSE:  ED Course as of Oct 28 0526   Wed Oct 28, 2020   0008 CBC WITH AUTO DIFFERENTIAL(!):    WBC 13.5(!)   RBC 3.61(!)   Hemoglobin Quant 10. 2(!)   Hematocrit 32.7(!)   MCV 90.6   MCH 28.3   MCHC 31.2   RDW 14.2   Platelet Count 205   MPV 10.6   NRBC Automated 0.0   Differential Type NOT REPORTED   Seg Neutrophils PENDING   Lymphocytes PENDING   Monocytes PENDING   Eosinophils % PENDING   Basophils PENDING   Immature Granulocytes PENDING   Segs Absolute PENDING   Absolute Lymph # PENDING   Absolute Mono # PENDING   Absolute Eos # PENDING   Basophils Absolute PENDIN. .. [AM]   0008 LACTIC ACID, WHOLE BLOOD(!):    Lactic Acid, Whole Blood 2.5(!) [AM]   0049 RAPID INFLUENZA A/B ANTIGENS:    Specimen Description . NASOPHARYNGEAL SWAB   Special Requests NOT REPORTED   DIRECT EXAM. NEGATIVE for Influenza A + B antigens. PCR testing to confirm this result is available upon request.  Specimen will be saved in the laboratory for 7 days. Please call [phone] if PCR testing is indicated. [AM]   0049 COMPREHENSIVE METABOLIC PANEL(!):    Glucose 149(!)   BUN 10   Creatinine 0.54   Bun/Cre Ratio NOT REPORTED   Calcium 9.3   Sodium 136   Potassium 3.4(!)   Chloride 100   CO2 23   Anion Gap 13   Alk Phos 79   ALT 7   AST 15   Bilirubin 0.38   Total Protein 7.8   Albumin 4.1   Albumin/Globulin Ratio 1.1   GFR Non- >60   GFR  >60   GFR Comment        GFR Staging NOT REPORTED [AM]   0050 XR CHEST PORTABLE [AM]   0113 Pelvic done    [AM]   0115 Shared decision-making with patient, agreeable on CT scan to rule out any intra-abdominal pathology. OB/GYN consulted    [AM]   0127 Spoke to OB    [AM]   0151 Urinalysis with Microscopic(!):    Color, UA YELLOW   Turbidity UA CLEAR   Glucose, UA NEGATIVE   Bilirubin, Urine NEGATIVE   Ketones, Urine NEGATIVE   Specific [place], UA 1.008   Urine Hgb SMALL(!)   pH, UA 5.5   Protein, UA NEGATIVE   Urobilinogen, Urine Normal   Nitrite, Urine NEGATIVE   Leukocyte Esterase, Urine TRACE(!)   -        WBC, UA 5 TO 10   RBC, UA 0 TO 2   Casts UA 0 TO 2 HYALINE Reference range defined for non-centrifuged specimen. Crystals, UA NOT REPORTED   Epithelial Cells, UA 0 TO 2   Renal Epi. .. [AM]   1531 VAGINITIS DNA PROBE:    Specimen Description . VAGINA   Special Requests NOT REPORTED   DIRECT EXAM. NEGATIVE for Candida sp. DIRECT EXAM. NEGATIVE for Gardnerella vaginalis   DIRECT EXAM. NEGATIVE for Trichomonas vaginalis   DIRECT EXAM. Method of testing is a DNA probe intended for detection and identification of Candida species, Gardnerella vaginalis, and Trichomonas vaginalis nucleic acid in vaginal fluid specimens from patients with symptoms of vaginitis/vaginosis. [AM]   2850 CT ABDOMEN PELVIS W IV CONTRAST [AM]   0329 Lactic Acid, Whole Blood: 1.2 [AM]   5574 SARS-CoV-2, Rapid: Not Detected [AM]      ED Course User Index  [AM] Pedrito Sesay,        MDM  Number of Diagnoses or Management Options  Acute nonintractable headache, unspecified headache type:   Colitis:   Generalized abdominal pain:   Vaginal bleeding:   Diagnosis management comments: 65-year-old female presented to the emergency department with multiple days of generalized malaise. Initial evaluation patient tachycardic, febrile. No respiratory distress, not hypoxic, not hypotensive. Generalized abdominal tenderness. Recent  dissection and IUD placement. Septic work-up obtained. Leukocytosis, elevated lactic acid noted.   Chest x-ray read as possible Covid, however reviewed did previous x-ray and today's x-ray and did not believe that there is any evidence for diffuse patchy infiltrates. Urinalysis not concerning for infection. Pelvic performed and swabs sent and I did discuss with OB/GYN the case and they felt that the bleeding was appropriate as she may be starting her menses. CT of the abdomen revealed bowel loops with fluid and bowel wall edema concerning for enterocolitis. Patient started on IV ciprofloxacin and Flagyl. Patient admitted to the hospitalist for further IV antibiotics and management of her nausea and vomiting. Covid swab sent which was negative. Amount and/or Complexity of Data Reviewed  Clinical lab tests: ordered and reviewed  Tests in the radiology section of CPT®: ordered and reviewed  Review and summarize past medical records: yes  Discuss the patient with other providers: yes  Independent visualization of images, tracings, or specimens: yes    Risk of Complications, Morbidity, and/or Mortality  General comments: Sepsis Times and Checklist  Vital Signs:          Temp: 102.4 °F (39.1 °C)    SIRS (>2)   Temp > 38.0C or < 36C   HR > 90   RR > 20   WBC > 12 or < 4 or >10% bands  SIRS (>2) and confirmed or suspected source of infection = Sepsis  Is Sepsis due to likely bacterial infection?: No  If not, then sepsis is due to likely viral etiology. Severe Sepsis Identified:  Sepsis Orders:   CBC: Yes   CMP: Yes   PT/PTT: Yes   Blood Cultures x2: Yes   Urinalysis and Urine Culture: Yes   Lactate: Yes   Broad Spectrum Antibiotics Given (within 3 hours of sepsis identification, after blood cultures):  Yes    (If unable to obtain IV access for IV antibiotics within 3 hours of identification of sepsis, IM antibiotics is acceptable.)              If lactate >2.0 MUST repeat within 6 hours    If elevated, is elevated lactate from a likely infectious source?: Yes. IV Fluid Bolus:  Is lactate > 4.0:  No  If lactate >  4.0 OR hypotension (MAP<65 mmHg) (with 2 BP readings) 30ml/kg crystalloid MUST be ordered.     Fluids must be initiated within 3 hours of sepsis identification. These fluids must have a rate > 125 ml/hr. Is the patient Morbidly Obese (BMI > 30): No  Does the patient or patient advocate refuse the entire 30 ml/kg IV fluid bolus? No      Patient Progress  Patient progress: stable      CONSULTS:  IP CONSULT TO HOSPITALIST    CRITICAL CARE:  Please see attending note    FINAL IMPRESSION     1. Colitis    2. Generalized abdominal pain    3. Acute nonintractable headache, unspecified headache type    4. Vaginal bleeding        DISPOSITION / PLAN   DISPOSITION      ADMIT    Hilda Diop [place]  Emergency Medicine Resident Physician, PGY-3    (Please note that portions of this note were completed with a voice recognition program.  Efforts were made to edit the dictations but occasionally words are mis-transcribed.)        [address] [facility],   Resident  10/28/20 [id number]

## 2020-10-28 NOTE — H&P
[facility]  Office: [address], DO, Alen Hartmans, DO, Michela Sanchezint, DO, Alexsandra Light Blood, DO, Mayra Wong MD, Anne Marie Rudd MD, Yesika Stephens MD, Joycelyn Garcia MD, Sanaz Aguirre MD, Danielle Corley MD, Isa Valentino MD, Jessica Méndez MD, Jovanni Hassan MD, Lamonte Cooks, DO, Stephanie Ernst MD, Clarita Medina MD, Mallory De La Torre DO, Nilsa Grover MD,  Carmela Art, DO, Yamilet Baig MD, Laura Max MD, Giacomo Felix, [facility], [facility] [place], CNP, Demi Landon, CNP, Carlin Ordoñez, CNS, Ria Loera, CNP, Lillie Hines, CNP, Victor Manuel Valenzuela, CNP, Dayron Sheets, CNP, Cherry Davis, CNP, Li Franklin PA-C, Camilo Arceo, [facility], Matheus Medina, CNP, Juan Pablo Travis, CNP, Abbi Mckay, CNP, Azam Borja, CNP, April Ng, [address]    HISTORY AND PHYSICAL EXAMINATION            Date:   10/28/2020  Patient name:  Ba Colon  Date of admission:  10/27/2020 11:12 PM  MRN:   [id number]  Account:  [de-identified]  YOB: 1995  PCP:    Kamille Dick MD  Room:   6954/9128-13  Code Status:    Full Code    Chief Complaint:     Chief Complaint   Patient presents with    Headache    Abdominal Pain    Fatigue       History Obtained From:     patient, electronic medical record    History of Present Illness:     Ba Colon is a 21 y.o. female past medical history significant for gestational hypertension, menometrorrhagia, iron deficiency anemia. She presented to the emergency department complaining of fever, chills, abdominal pain. Of note, patient recently gave birth via emergent  for abnormal fetal heart rate . Patient reports that her symptoms began last Friday with sweats and chills. Shortly thereafter, she started developing severe headache. Her symptoms progressed to lower abdominal pain and fever over the past 24 hours.   She states that she has had 2 episodes of vomiting and 2 episodes of diarrhea. She denies any recent sick contacts. She denies cough. Initially in the emergency department her vitals suggested sepsis with a heart rate of 135, temperature of 102.4. She underwent CT scan of the abdomen which is suggesting enterocolitis. No discernible abscess within the pelvis. White blood cell count elevated at 13.5. Chest x-ray revealing patchy bibasilar opacities. Rapid COVID-19 negative. Patient evaluated by OB/GYN in the emergency department who does not believe this to be related to any gynecologic issue. Patient admitted to medicine for further care. She is administered Cipro and Flagyl in the emergency department. Past Medical History:     Past Medical History:   Diagnosis Date    Gestational hypertension (G1) 2020    History of blood transfusion     From Motrin use     Menometrorrhagia 5/10/2018    Symptomatic anemia 2018    From Motrin use     Vision abnormalities         Past Surgical History:     Past Surgical History:   Procedure Laterality Date     SECTION N/A 2020     SECTION performed by Jose Alejandro Lamb DO at [facility] L[facility]D OR    COLONOSCOPY  2018    No lesions seen    NM COLON CA SCRN NOT HI RSK IND N/A 2018    COLONOSCOPY performed by Irene Herbert MD at  formerly [facility]  2018    No lesions seen up to the 2nd part of the duodenum.  UPPER GASTROINTESTINAL ENDOSCOPY N/A 2018    EGD DIAGNOSTIC ONLY performed by Irene Herbert MD at [address] [place] [address]        Medications Prior to Admission:     Prior to Admission medications    Medication Sig Start Date End Date Taking?  Authorizing Provider   Blood Pressure KIT 1 Device by Does not apply route 2 times daily  Patient not taking: Reported on 10/21/2020 10/21/20   Kash Ureña MD   ferrous sulfate (FE TABS) 325 (65 Fe) MG EC tablet Take 1 tablet by mouth 2 times daily 9/3/20   Alexa Coats DO Allergies:     Motrin [ibuprofen]    Social History:     Tobacco:    reports that she has never smoked. She has never used smokeless tobacco.  Alcohol:      reports no history of alcohol use. Drug Use:  reports no history of drug use. Family History:     Family History   Problem Relation Age of Onset    No Known Problems Mother     No Known Problems Father     Other Maternal Grandmother         diverticulitis    No Known Problems Paternal Grandfather     No Known Problems Paternal Grandmother     No Known Problems Maternal Grandfather     No Known Problems Brother     No Known Problems Sister     Breast Cancer Neg Hx     Cancer Neg Hx     Colon Cancer Neg Hx     Diabetes Neg Hx     Eclampsia Neg Hx     Hypertension Neg Hx     Ovarian Cancer Neg Hx      Labor Neg Hx     Spont Abortions Neg Hx     Stroke Neg Hx        Review of Systems:     Positive and Negative as described in HPI. CONSTITUTIONAL: Positive for fevers, chills, sweats, fatigue. Negative for weight loss  HEENT:  negative for vision, hearing changes, runny nose, throat pain  RESPIRATORY:  negative for shortness of breath, cough, congestion, wheezing  CARDIOVASCULAR:  negative for chest pain, palpitations  GASTROINTESTINAL: Positive for nausea, vomiting, diarrhea, constipation, change in bowel habits, abdominal pain   GENITOURINARY:  negative for difficulty of urination, burning with urination, frequency   INTEGUMENT:  negative for rash, skin lesions, easy bruising   HEMATOLOGIC/LYMPHATIC:  negative for swelling/edema   ALLERGIC/IMMUNOLOGIC:  negative for urticaria , itching  ENDOCRINE:  negative increase in drinking, increase in urination, hot or cold intolerance  MUSCULOSKELETAL:  negative joint pains, muscle aches, swelling of joints  NEUROLOGICAL: Positive for headaches.   Negative for dizziness, lightheadedness, numbness, pain, tingling extremities  BEHAVIOR/PSYCH:  negative for depression, anxiety    Physical Specimen Description . BLOOD     Special Requests LFA 16ML     Culture NO GROWTH 8 HOURS    CBC WITH AUTO DIFFERENTIAL    Collection Time: 10/27/20 11:36 PM   Result Value Ref Range    WBC 13.5 (H) 3.5 - 11.3 k/uL    RBC 3.61 (L) 3.95 - 5.11 m/uL    Hemoglobin 10.2 (L) 11.9 - 15.1 g/dL    Hematocrit 32.7 (L) 36.3 - 47.1 %    MCV 90.6 82.6 - 102.9 fL    MCH 28.3 25.2 - 33.5 pg    MCHC 31.2 28.4 - 34.8 g/dL    RDW 14.2 11.8 - 14.4 %    Platelets 569 726 - 480 k/uL    MPV 10.6 8.1 - 13.5 fL    NRBC Automated 0.0 0.0 per 100 WBC    Differential Type NOT REPORTED     WBC Morphology NOT REPORTED     RBC Morphology NOT REPORTED     Platelet Estimate NOT REPORTED     Immature Granulocytes 0 0 %    Seg Neutrophils 92 (H) 36 - 66 %    Lymphocytes 4 (L) 24 - 44 %    Monocytes 3 1 - 7 %    Eosinophils % 0 (L) 1 - 4 %    Basophils 1 0 - 2 %    Absolute Immature Granulocyte 0.00 0.00 - 0.30 k/uL    Segs Absolute 12.41 (H) 1.8 - 7.7 k/uL    Absolute Lymph # 0.54 (L) 1.0 - 4.8 k/uL    Absolute Mono # 0.41 0.1 - 0.8 k/uL    Absolute Eos # 0.00 0.0 - 0.4 k/uL    Basophils Absolute 0.14 0.0 - 0.2 k/uL    Morphology Normal    COMPREHENSIVE METABOLIC PANEL    Collection Time: 10/27/20 11:36 PM   Result Value Ref Range    Glucose 149 (H) 70 - 99 mg/dL    BUN 10 6 - 20 mg/dL    CREATININE 0.54 0.50 - 0.90 mg/dL    Bun/Cre Ratio NOT REPORTED 9 - 20    Calcium 9.3 8.6 - 10.4 mg/dL    Sodium 136 135 - 144 mmol/L    Potassium 3.4 (L) 3.7 - 5.3 mmol/L    Chloride 100 98 - 107 mmol/L    CO2 23 20 - 31 mmol/L    Anion Gap 13 9 - 17 mmol/L    Alkaline Phosphatase 79 35 - 104 U/L    ALT 7 5 - 33 U/L    AST 15 <32 U/L    Total Bilirubin 0.38 0.3 - 1.2 mg/dL    Total Protein 7.8 6.4 - 8.3 g/dL    Alb 4.1 3.5 - 5.2 g/dL    Albumin/Globulin Ratio 1.1 1.0 - 2.5    GFR Non-African American >60 >60 mL/min    GFR African American >60 >60 mL/min    GFR Comment          GFR Staging NOT REPORTED    LIPASE    Collection Time: 10/27/20 11:36 PM   Result Value Ref Range    Lipase 13 13 - 60 U/L   PROTIME-INR    Collection Time: 10/27/20 11:36 PM   Result Value Ref Range    Protime 11.2 9.0 - 12.0 sec    INR 1.1    APTT    Collection Time: 10/27/20 11:36 PM   Result Value Ref Range    PTT 29.1 20.5 - 30.5 sec   HCG Qualitative, Serum    Collection Time: 10/27/20 11:36 PM   Result Value Ref Range    hCG Qual NEGATIVE NEGATIVE   LACTIC ACID, WHOLE BLOOD    Collection Time: 10/27/20 11:36 PM   Result Value Ref Range    Lactic Acid, Whole Blood 2.5 (H) 0.7 - 2.1 mmol/L   VAGINITIS DNA PROBE    Collection Time: 10/28/20  1:26 AM    Specimen: Vaginal   Result Value Ref Range    Specimen Description . VAGINA     Special Requests NOT REPORTED     Direct Exam NEGATIVE for Candida sp. Direct Exam NEGATIVE for Gardnerella vaginalis     Direct Exam NEGATIVE for Trichomonas vaginalis     Direct Exam       Method of testing is a DNA probe intended for detection and identification of Candida species, Gardnerella vaginalis, and Trichomonas vaginalis nucleic acid in vaginal fluid specimens from patients with symptoms of vaginitis/vaginosis. Urinalysis with Microscopic    Collection Time: 10/28/20  1:31 AM   Result Value Ref Range    Color, UA YELLOW YELLOW    Turbidity UA CLEAR CLEAR    Glucose, Ur NEGATIVE NEGATIVE    Bilirubin Urine NEGATIVE NEGATIVE    Ketones, Urine NEGATIVE NEGATIVE    Specific Gravity, UA 1.008 1.005 - 1.030    Urine Hgb SMALL (A) NEGATIVE    pH, UA 5.5 5.0 - 8.0    Protein, UA NEGATIVE NEGATIVE    Urobilinogen, Urine Normal Normal    Nitrite, Urine NEGATIVE NEGATIVE    Leukocyte Esterase, Urine TRACE (A) NEGATIVE    -          WBC, UA 5 TO 10 0 - 5 /HPF    RBC, UA 0 TO 2 0 - 4 /HPF    Casts UA  0 - 8 /LPF     0 TO 2 HYALINE Reference range defined for non-centrifuged specimen.     Crystals, UA NOT REPORTED None /HPF    Epithelial Cells UA 0 TO 2 0 - 5 /HPF    Renal Epithelial, UA NOT REPORTED 0 /HPF    Bacteria, UA NOT REPORTED None    Mucus, UA NOT REPORTED None    Trichomonas, UA NOT REPORTED None    Amorphous, UA NOT REPORTED None    Other Observations UA NOT REPORTED NOT REQ. Yeast, UA NOT REPORTED None   LACTIC ACID, WHOLE BLOOD    Collection Time: 10/28/20  3:05 AM   Result Value Ref Range    Lactic Acid, Whole Blood 1.2 0.7 - 2.1 mmol/L   COVID-19    Collection Time: 10/28/20  3:22 AM    Specimen: Other   Result Value Ref Range    SARS-CoV-2          SARS-CoV-2, Rapid Not Detected Not Detected    Source . NASOPHARYNGEAL SWAB     SARS-CoV-2         Lactate, Sepsis    Collection Time: 10/28/20 11:24 AM   Result Value Ref Range    Lactic Acid, Sepsis NOT REPORTED 0.5 - 1.9 mmol/L    Lactic Acid, Sepsis, Whole Blood 1.1 0.5 - 1.9 mmol/L       Imaging/Diagnostics:  Ct Abdomen Pelvis W Iv Contrast    Result Date: 10/28/2020  Fluid-filled loops of small bowel and colon suggestive of infectious or inflammatory enterocolitis. No free intraperitoneal air or fluid to suggest viscus perforation. Mild pelvic edema/congestion, likely secondary to provided history of recent  section. IUD noted in place. Xr Chest Portable    Result Date: 10/28/2020  Patchy lower lobe opacities which are nonspecific but could indicate an atypical bronchopneumonia such as COVID-19 pneumonia. Xr Abdomen (2 Views)    Result Date: 10/27/2020  Large stool volume.        Assessment :      Hospital Problems           Last Modified POA    * (Principal) Sepsis without acute organ dysfunction ([facility] [address] 10/28/2020 Yes    Normocytic anemia due to blood loss 10/28/2020 Yes    Overview Signed 2020  9:30 AM by Kurt Pastor, DO     Hgb 9.4 ()         S/P primary low transverse  (Chronic) 10/28/2020 Yes    Gestational hypertension (G1) 10/28/2020 Yes    Enterocolitis 10/28/2020 Yes    Hypokalemia 10/28/2020 Yes          Plan:     Patient status inpatient in the Med/Surge    #Sepsis secondary to presumed colitis  -Evidenced by tachycardia, fever, elevated white blood cell count. Sepsis present on admission, since resolved. -We will transition antibiotics to Unasyn IV  -Obtain stool gastrointestinal panel, C. Difficile  -IV fluid resuscitation  -Clear liquid diet, advance as tolerated    #Gestational hypertension  -Blood pressure currently well controlled    #Status post emergent   -Followed by OB/GYN, no further work-up necessary in the hospital    #Anemia  -Chronic, continue iron supplementation    #Hypokalemia  -Replete per protocol    #Lovenox DVT prophylaxis  Consultations:   IP CONSULT TO HOSPITALIST    Patient is admitted as inpatient status because of co-morbidities listed above, severity of signs and symptoms as outlined, requirement for current medical therapies and most importantly because of direct risk to patient if care not provided in a hospital setting. Expected length of stay > 48 hours.     July Nunez PA-C  10/28/2020  1:45 PM    Copy sent to Dr. Aidee Campos MD

## 2020-10-29 VITALS
RESPIRATION RATE: 18 BRPM | BODY MASS INDEX: 28.68 KG/M2 | TEMPERATURE: 98.2 F | DIASTOLIC BLOOD PRESSURE: 77 MMHG | HEART RATE: 95 BPM | SYSTOLIC BLOOD PRESSURE: 120 MMHG | HEIGHT: 64 IN | WEIGHT: 168 LBS | OXYGEN SATURATION: 98 %

## 2020-10-29 PROBLEM — E87.6 HYPOKALEMIA: Status: RESOLVED | Noted: 2020-10-28 | Resolved: 2020-10-29

## 2020-10-29 LAB
ALBUMIN SERPL-MCNC: 3.1 G/DL (ref 3.5–5.2)
ALBUMIN/GLOBULIN RATIO: 1.1 (ref 1–2.5)
ALP BLD-CCNC: 61 U/L (ref 35–104)
ALT SERPL-CCNC: <5 U/L (ref 5–33)
AMYLASE: 26 U/L (ref 28–100)
ANION GAP SERPL CALCULATED.3IONS-SCNC: 12 MMOL/L (ref 9–17)
AST SERPL-CCNC: 7 U/L
BILIRUB SERPL-MCNC: 0.16 MG/DL (ref 0.3–1.2)
BUN BLDV-MCNC: 4 MG/DL (ref 6–20)
BUN/CREAT BLD: ABNORMAL (ref 9–20)
CALCIUM SERPL-MCNC: 8.1 MG/DL (ref 8.6–10.4)
CHLORIDE BLD-SCNC: 107 MMOL/L (ref 98–107)
CO2: 22 MMOL/L (ref 20–31)
CREAT SERPL-MCNC: 0.48 MG/DL (ref 0.5–0.9)
GFR AFRICAN AMERICAN: >60 ML/MIN
GFR NON-AFRICAN AMERICAN: >60 ML/MIN
GFR SERPL CREATININE-BSD FRML MDRD: ABNORMAL ML/MIN/{1.73_M2}
GFR SERPL CREATININE-BSD FRML MDRD: ABNORMAL ML/MIN/{1.73_M2}
GLUCOSE BLD-MCNC: 96 MG/DL (ref 70–99)
HCT VFR BLD CALC: 28.6 % (ref 36.3–47.1)
HEMOGLOBIN: 8.6 G/DL (ref 11.9–15.1)
LIPASE: 11 U/L (ref 13–60)
MAGNESIUM: 2 MG/DL (ref 1.6–2.6)
MCH RBC QN AUTO: 28 PG (ref 25.2–33.5)
MCHC RBC AUTO-ENTMCNC: 30.1 G/DL (ref 28.4–34.8)
MCV RBC AUTO: 93.2 FL (ref 82.6–102.9)
NRBC AUTOMATED: 0 PER 100 WBC
PDW BLD-RTO: 14.2 % (ref 11.8–14.4)
PHOSPHORUS: 3.3 MG/DL (ref 2.6–4.5)
PLATELET # BLD: 276 K/UL (ref 138–453)
PMV BLD AUTO: 10.4 FL (ref 8.1–13.5)
POTASSIUM SERPL-SCNC: 3.4 MMOL/L (ref 3.7–5.3)
RBC # BLD: 3.07 M/UL (ref 3.95–5.11)
SODIUM BLD-SCNC: 141 MMOL/L (ref 135–144)
TOTAL PROTEIN: 5.9 G/DL (ref 6.4–8.3)
WBC # BLD: 7.6 K/UL (ref 3.5–11.3)

## 2020-10-29 PROCEDURE — APPSS30 APP SPLIT SHARED TIME 16-30 MINUTES: Performed by: PHYSICIAN ASSISTANT

## 2020-10-29 PROCEDURE — 84100 ASSAY OF PHOSPHORUS: CPT

## 2020-10-29 PROCEDURE — 2500000003 HC RX 250 WO HCPCS: Performed by: NURSE PRACTITIONER

## 2020-10-29 PROCEDURE — 36415 COLL VENOUS BLD VENIPUNCTURE: CPT

## 2020-10-29 PROCEDURE — 6360000002 HC RX W HCPCS: Performed by: PHYSICIAN ASSISTANT

## 2020-10-29 PROCEDURE — 83690 ASSAY OF LIPASE: CPT

## 2020-10-29 PROCEDURE — 82150 ASSAY OF AMYLASE: CPT

## 2020-10-29 PROCEDURE — 83735 ASSAY OF MAGNESIUM: CPT

## 2020-10-29 PROCEDURE — 2580000003 HC RX 258: Performed by: PHYSICIAN ASSISTANT

## 2020-10-29 PROCEDURE — 80053 COMPREHEN METABOLIC PANEL: CPT

## 2020-10-29 PROCEDURE — 85027 COMPLETE CBC AUTOMATED: CPT

## 2020-10-29 PROCEDURE — 6360000002 HC RX W HCPCS: Performed by: NURSE PRACTITIONER

## 2020-10-29 PROCEDURE — 99232 SBSQ HOSP IP/OBS MODERATE 35: CPT | Performed by: FAMILY MEDICINE

## 2020-10-29 PROCEDURE — 6370000000 HC RX 637 (ALT 250 FOR IP): Performed by: NURSE PRACTITIONER

## 2020-10-29 RX ORDER — CIPROFLOXACIN 500 MG/1
500 TABLET, FILM COATED ORAL 2 TIMES DAILY
Qty: 14 TABLET | Refills: 0 | Status: SHIPPED | OUTPATIENT
Start: 2020-10-29 | End: 2020-11-05

## 2020-10-29 RX ORDER — METRONIDAZOLE 500 MG/1
500 TABLET ORAL 3 TIMES DAILY
Qty: 21 TABLET | Refills: 0 | Status: SHIPPED | OUTPATIENT
Start: 2020-10-29 | End: 2020-11-05

## 2020-10-29 RX ORDER — DIPHENHYDRAMINE HYDROCHLORIDE 50 MG/ML
25 INJECTION INTRAMUSCULAR; INTRAVENOUS ONCE
Status: COMPLETED | OUTPATIENT
Start: 2020-10-29 | End: 2020-10-29

## 2020-10-29 RX ORDER — PROCHLORPERAZINE EDISYLATE 5 MG/ML
10 INJECTION INTRAMUSCULAR; INTRAVENOUS ONCE
Status: COMPLETED | OUTPATIENT
Start: 2020-10-29 | End: 2020-10-29

## 2020-10-29 RX ORDER — BUTALBITAL, ACETAMINOPHEN AND CAFFEINE 50; 325; 40 MG/1; MG/1; MG/1
1 TABLET ORAL EVERY 4 HOURS PRN
Qty: 18 TABLET | Refills: 0 | Status: SHIPPED | OUTPATIENT
Start: 2020-10-29 | End: 2021-06-03

## 2020-10-29 RX ORDER — GREEN TEA/HOODIA GORDONII 315-12.5MG
1 CAPSULE ORAL DAILY
Qty: 30 TABLET | Refills: 0 | Status: SHIPPED | OUTPATIENT
Start: 2020-10-29 | End: 2020-11-13

## 2020-10-29 RX ADMIN — ACETAMINOPHEN 650 MG: 325 TABLET ORAL at 16:33

## 2020-10-29 RX ADMIN — PROCHLORPERAZINE EDISYLATE 10 MG: 5 INJECTION INTRAMUSCULAR; INTRAVENOUS at 11:01

## 2020-10-29 RX ADMIN — ONDANSETRON 4 MG: 2 INJECTION INTRAMUSCULAR; INTRAVENOUS at 08:59

## 2020-10-29 RX ADMIN — SODIUM CHLORIDE: 9 INJECTION, SOLUTION INTRAVENOUS at 04:10

## 2020-10-29 RX ADMIN — FAMOTIDINE 20 MG: 10 INJECTION INTRAVENOUS at 08:59

## 2020-10-29 RX ADMIN — ACETAMINOPHEN 650 MG: 325 TABLET ORAL at 05:54

## 2020-10-29 RX ADMIN — POTASSIUM CHLORIDE 40 MEQ: 1500 TABLET, EXTENDED RELEASE ORAL at 09:00

## 2020-10-29 RX ADMIN — MORPHINE SULFATE 2 MG: 2 INJECTION, SOLUTION INTRAMUSCULAR; INTRAVENOUS at 04:15

## 2020-10-29 RX ADMIN — FERROUS SULFATE TAB EC 325 MG (65 MG FE EQUIVALENT) 325 MG: 325 (65 FE) TABLET DELAYED RESPONSE at 08:59

## 2020-10-29 RX ADMIN — MORPHINE SULFATE 2 MG: 2 INJECTION, SOLUTION INTRAMUSCULAR; INTRAVENOUS at 09:00

## 2020-10-29 RX ADMIN — DIPHENHYDRAMINE HYDROCHLORIDE 25 MG: 50 INJECTION, SOLUTION INTRAMUSCULAR; INTRAVENOUS at 11:01

## 2020-10-29 ASSESSMENT — PAIN SCALES - GENERAL
PAINLEVEL_OUTOF10: 10
PAINLEVEL_OUTOF10: 10
PAINLEVEL_OUTOF10: 9
PAINLEVEL_OUTOF10: 10
PAINLEVEL_OUTOF10: 8

## 2020-10-29 ASSESSMENT — PAIN DESCRIPTION - PAIN TYPE: TYPE: ACUTE PAIN

## 2020-10-29 ASSESSMENT — PAIN DESCRIPTION - FREQUENCY: FREQUENCY: CONTINUOUS

## 2020-10-29 ASSESSMENT — PAIN DESCRIPTION - ONSET: ONSET: ON-GOING

## 2020-10-29 ASSESSMENT — PAIN DESCRIPTION - LOCATION: LOCATION: ABDOMEN;HEAD

## 2020-10-29 ASSESSMENT — PAIN DESCRIPTION - DESCRIPTORS: DESCRIPTORS: HEADACHE;SORE

## 2020-10-29 NOTE — DISCHARGE SUMMARY
[facility]  Office: [address], DO, Marisol Giovanny, DO, Tricia Adkins, DO, Jimbo Cruz, DO, Nessa Charles MD, Anne Marie Bean MD, Symone Pineda MD, Yessica Gonzalez MD, Clare Soliz MD, Radu Underwood MD, Ina Bolden MD, Jose Metcalf MD, Jovanni Berg MD, Sammy Ballesteros DO, Micheal Anand MD, Giovanny Escalante MD, Susan Nieto DO, Hung [place] MD,  Thi Beal DO, Gloria Clark MD, Harriett Mcgee MD, Mike Rodriguez, [facility], [facility], CNP, Christian Puckett, CNP, Yogesh Patel, [facility], Heriberto Gordon, CNP, Patty Hanna, CNP, Daphne Johnson, CNP, Yamilka Beatty, CNP, Huma Walker, CNP, Charo Menendez PA-C, Yamile Jackson, [facility], Olga De Jesus, CNP, Mark Nails, CNP, Ioana Sear, CNP, Deborah Cortes, CNP, Allen Racer, [address] [place] [address]    Discharge Summary     Patient ID: Brendan Velez  :  1995   MRN: [id number]     ACCOUNT:  [de-identified]   Patient's PCP: Constance Jacobs MD  Admit Date: 10/27/2020   Discharge Date: 10/29/2020   Length of Stay: 1  Code Status:  Full Code  Admitting Physician: Yessica Gonzalez MD  Discharge Physician: Checo Murphy PA-C     Active Discharge Diagnoses:     Hospital Problem Lists:  Principal Problem:    Sepsis without acute organ dysfunction [facility])  Active Problems:    Normocytic anemia due to blood loss    S/P primary low transverse     Gestational hypertension (G1)    Colitis    Generalized abdominal pain  Resolved Problems:    Hypokalemia    Acute nonintractable headache      Admission Condition:  poor     Discharged Condition: good    Hospital Stay:     Hospital Course:      Surekha Boone a 21 y. o. female past medical history significant for gestational hypertension, menometrorrhagia, iron deficiency anemia.  She presented to the emergency department complaining of fever, chills, abdominal pain.     Of note, patient recently gave birth via emergent  for abnormal fetal heart rate .  Patient reports that her symptoms began last Friday with sweats and chills.  Shortly thereafter, she started developing severe headache.  Her symptoms progressed to lower abdominal pain and fever over the past 24 hours.  She states that she has had 2 episodes of vomiting and 2 episodes of diarrhea.  She denies any recent sick contacts.  She denies cough.      Initially in the emergency department her vitals suggested sepsis with a heart rate of 135, temperature of 102. 4.  She underwent CT scan of the abdomen which is suggesting enterocolitis.  No discernible abscess within the pelvis.  White blood cell count elevated at 13. 5.  Chest x-ray revealing patchy bibasilar opacities.  Rapid COVID-19 negative.  Patient evaluated by OB/GYN in the emergency department who does not believe this to be related to any gynecologic issue.  Patient admitted to medicine for further care. Sandrine Umana is administered Cipro and Flagyl in the emergency department. Pt received one dose of unasyn on the floor. Abx were discontinued d/t thought that her condition was secondary to viral gastroenteritis. Procalcitonin returned significantly elevated. She was resumed on oral abx, cipro/flagyl x 7 days. She will be discharged home with prescription for these medications.     Significant Diagnostic Studies:   Labs / Micro:  CBC:   Lab Results   Component Value Date    WBC 7.6 10/29/2020    RBC 3.07 10/29/2020    HGB 8.6 10/29/2020    HCT 28.6 10/29/2020    MCV 93.2 10/29/2020    MCH 28.0 10/29/2020    MCHC 30.1 10/29/2020    RDW 14.2 10/29/2020     10/29/2020     BMP:    Lab Results   Component Value Date    GLUCOSE 96 10/29/2020     10/29/2020    K 3.4 10/29/2020     10/29/2020    CO2 22 10/29/2020    ANIONGAP 12 10/29/2020    BUN 4 10/29/2020    CREATININE 0.48 10/29/2020    BUNCRER NOT REPORTED 10/29/2020    CALCIUM 8.1 10/29/2020    LABGLOM >60 10/29/2020    GFRAA >60 10/29/2020    GFR      10/29/2020    GFR NOT REPORTED 10/29/2020     HFP:    Lab Results   Component Value Date    PROT 5.9 10/29/2020     CMP:    Lab Results   Component Value Date    GLUCOSE 96 10/29/2020     10/29/2020    K 3.4 10/29/2020     10/29/2020    CO2 22 10/29/2020    BUN 4 10/29/2020    CREATININE 0.48 10/29/2020    ANIONGAP 12 10/29/2020    ALKPHOS 61 10/29/2020    ALT <5 10/29/2020    AST 7 10/29/2020    BILITOT 0.16 10/29/2020    LABALBU 3.1 10/29/2020    ALBUMIN 1.1 10/29/2020    LABGLOM >60 10/29/2020    GFRAA >60 10/29/2020    GFR      10/29/2020    GFR NOT REPORTED 10/29/2020    PROT 5.9 10/29/2020    CALCIUM 8.1 10/29/2020     PT/INR:    Lab Results   Component Value Date    PROTIME 11.2 10/27/2020    INR 1.1 10/27/2020     PTT:   Lab Results   Component Value Date    APTT 29.1 10/27/2020       Radiology:  Ct Abdomen Pelvis W Iv Contrast    Result Date: 10/28/2020  Fluid-filled loops of small bowel and colon suggestive of infectious or inflammatory enterocolitis. No free intraperitoneal air or fluid to suggest viscus perforation. Mild pelvic edema/congestion, likely secondary to provided history of recent  section. IUD noted in place. Xr Chest Portable    Result Date: 10/28/2020  Patchy lower lobe opacities which are nonspecific but could indicate an atypical bronchopneumonia such as COVID-19 pneumonia. Xr Abdomen (2 Views)    Result Date: 10/27/2020  Large stool volume. Consultations:    Consults:     Final Specialist Recommendations/Findings:   IP CONSULT TO HOSPITALIST      The patient was seen and examined on day of discharge and this discharge summary is in conjunction with any daily progress note from day of discharge.     Discharge plan:     Disposition: Home    Physician Follow Up:   Chantal Nuñez MD  [address] [facility]  [address]     In 1 week  As needed    [address] [facility]

## 2020-10-29 NOTE — PROGRESS NOTES
[facility]  Office: [address], DO, Alen Dozier, DO, Michela Carla, DO, Alexsandra Light Blood, DO, Mayra Wong MD, Anne Marie Rudd MD, Yesika Stephens MD, Joycelyn Garcia MD, Sanaz Aguirre MD, Danielle Corley MD, Vivi Butler MD, Jessica Méndez MD, Jovanni Hassan MD, Lamonte Cooks, DO, Stephanie Ernst MD, Clarita Medina MD, Mallory De La Torre DO, Nilsa Grover MD,  Carmela Art, DO, Yamilet Baig MD, Laura Max MD, Giacomo Felix [facility], CNP, Demi Landon, CNP, Carlin Ordoñez, CNS, Ria Loera, CNP, Lillie Hines, CNP, Victor Manuel Valenzuela, CNP, Dayron Sheets, CNP, Cherry Davis, CNP, Li Franklin PA-C, Camilo Arceo, [facility], Matheus Medina, CNP, Juan Pablo Travis, CNP, Abbi Mckay, CNP, Azam Borja, CNP, April Edwardst, [address] [place] [address]    Progress Note    10/29/2020    10:58 AM    Name:   Ba Colon  MRN:     [id number]     Acct:      [de-identified]   Room:   [address] [place] [address] Day:  1  Admit Date:  10/27/2020 11:12 PM    PCP:   Cj Farah MD  Code Status:  Full Code    Subjective:     C/C:   Chief Complaint   Patient presents with   [facility] Headache    Abdominal Pain    Fatigue     Interval History Status: improved. Pt seen and evaluated this morning. Reporting overall improvement in symptoms, with exception to worsening frontal headache this a.m. No additional episodes of diarrhea or vomiting. She has been afebrile. She is hemodynamically stable. She tolerated oral intake but does not have much of an appetite. Brief History:     Ba Colon is a 21 y.o. female past medical history significant for gestational hypertension, menometrorrhagia, iron deficiency anemia. She presented to the emergency department complaining of fever, chills, abdominal pain.     Of note, patient recently gave birth via emergent  for abnormal fetal heart rate .   Patient reports that her symptoms began last Friday with sweats and chills. Shortly thereafter, she started developing severe headache. Her symptoms progressed to lower abdominal pain and fever over the past 24 hours. She states that she has had 2 episodes of vomiting and 2 episodes of diarrhea. She denies any recent sick contacts. She denies cough.      Initially in the emergency department her vitals suggested sepsis with a heart rate of 135, temperature of 102.4. She underwent CT scan of the abdomen which is suggesting enterocolitis. No discernible abscess within the pelvis. White blood cell count elevated at 13.5. Chest x-ray revealing patchy bibasilar opacities. Rapid COVID-19 negative. Patient evaluated by OB/GYN in the emergency department who does not believe this to be related to any gynecologic issue. Patient admitted to medicine for further care. She is administered Cipro and Flagyl in the emergency department. Review of Systems:     Constitutional:  negative for chills, fevers, sweats  Respiratory:  negative for cough, dyspnea on exertion, shortness of breath, wheezing  Cardiovascular:  negative for chest pain, chest pressure/discomfort, lower extremity edema, palpitations  Gastrointestinal:  + for abdominal pain. Negative for constipation, diarrhea, nausea, vomiting  Neurological:  negative for dizziness. Positive for headache    Medications: Allergies: Allergies   Allergen Reactions    Motrin [Ibuprofen] Other (See Comments)     Pt experienced internal bleeding due this medication.  Pt reports she \" took too much\"  Has tolerated this medication in  the past.        Current Meds:   Scheduled Meds:    prochlorperazine  10 mg Intravenous Once    diphenhydrAMINE  25 mg Intravenous Once    ferrous sulfate  325 mg Oral BID    sodium chloride flush  10 mL Intravenous 2 times per day    famotidine (PEPCID) injection  20 mg Intravenous BID    enoxaparin  40 mg Subcutaneous Daily     Continuous Infusions:  sodium chloride 150 mL/hr at 10/29/20 0410     PRN Meds: sodium chloride flush, potassium chloride **OR** potassium alternative oral replacement **OR** potassium chloride, magnesium sulfate, acetaminophen **OR** acetaminophen, polyethylene glycol, promethazine **OR** ondansetron, nicotine, morphine    Data:     Past Medical History:   has a past medical history of Gestational hypertension (G1), History of blood transfusion, Menometrorrhagia, Symptomatic anemia, and Vision abnormalities. Social History:   reports that she has never smoked. She has never used smokeless tobacco. She reports that she does not drink alcohol or use drugs. Family History:   Family History   Problem Relation Age of Onset    No Known Problems Mother     No Known Problems Father     Other Maternal Grandmother         diverticulitis    No Known Problems Paternal Grandfather     No Known Problems Paternal Grandmother     No Known Problems Maternal Grandfather     No Known Problems Brother     No Known Problems Sister     Breast Cancer Neg Hx     Cancer Neg Hx     Colon Cancer Neg Hx     Diabetes Neg Hx     Eclampsia Neg Hx     Hypertension Neg Hx     Ovarian Cancer Neg Hx      Labor Neg Hx     Spont Abortions Neg Hx     Stroke Neg Hx        Vitals:  /77   Pulse 95   Temp 98.2 °F (36.8 °C) (Oral)   Resp 18   Ht 5' 4\" (1.626 m)   Wt 168 lb (76.2 kg)   LMP 10/20/2020   SpO2 98%   BMI 28.84 kg/m²   Temp (24hrs), Av.5 °F (36.4 °C), Min:95.8 °F (35.4 °C), Max:98.4 °F (36.9 °C)    No results for input(s): POCGLU in the last 72 hours. I/O (24Hr):   No intake or output data in the 24 hours ending 10/29/20 1058    Labs:  Hematology:  Recent Labs     10/27/20  2336 10/29/20  0558   WBC 13.5* 7.6   RBC 3.61* 3.07*   HGB 10.2* 8.6*   HCT 32.7* 28.6*   MCV 90.6 93.2   MCH 28.3 28.0   MCHC 31.2 30.1   RDW 14.2 14.2    276   MPV 10.6 10.4   INR 1.1  --      Chemistry:  Recent Labs 10/27/20  2336 10/28/20  0305 10/29/20  0558     --  141   K 3.4*  --  3.4*     --  107   CO2 23  --  22   GLUCOSE 149*  --  96   BUN 10  --  4*   CREATININE 0.54  --  0.48*   MG  --   --  2.0   ANIONGAP 13  --  12   LABGLOM >60  --  >60   GFRAA >60  --  >60   CALCIUM 9.3  --  8.1*   PHOS  --   --  3.3   LACTACIDWB 2.5* 1.2  --      Recent Labs     10/27/20  2336 10/29/20  0558   PROT 7.8 5.9*   LABALBU 4.1 3.1*   AST 15 7   ALT 7 <5*   ALKPHOS 79 61   BILITOT 0.38 0.16*   AMYLASE  --  26*   LIPASE 13 11*     ABG:No results found for: POCPH, PHART, PH, POCPCO2, EVV8SNH, PCO2, POCPO2, PO2ART, PO2, POCHCO3, QYH2FSY, HCO3, NBEA, PBEA, BEART, BE, THGBART, THB, CAD2BRY, GHTZ1LUB, A5IUUWZW, O2SAT, FIO2  Lab Results   Component Value Date/Time    SPECIAL NOT REPORTED 10/28/2020 01:32 AM     Lab Results   Component Value Date/Time    CULTURE NO GROWTH 10/28/2020 01:32 AM       Radiology:  Ct Abdomen Pelvis W Iv Contrast    Result Date: 10/28/2020  Fluid-filled loops of small bowel and colon suggestive of infectious or inflammatory enterocolitis. No free intraperitoneal air or fluid to suggest viscus perforation. Mild pelvic edema/congestion, likely secondary to provided history of recent  section. IUD noted in place. Xr Chest Portable    Result Date: 10/28/2020  Patchy lower lobe opacities which are nonspecific but could indicate an atypical bronchopneumonia such as COVID-19 pneumonia. Xr Abdomen (2 Views)    Result Date: 10/27/2020  Large stool volume. Physical Examination:        General appearance:  alert, cooperative and no distress  Mental Status:  oriented to person, place and time and normal affect  Lungs:  clear to auscultation bilaterally, normal effort  Heart:  regular rate and rhythm, no murmur  Abdomen:  soft, nondistended, normal bowel sounds, no masses, hepatomegaly, splenomegaly.  Continues to have right and left lower quadrant abdominal pain  Extremities:  no edema, redness, tenderness in the calves  Skin:  no gross lesions, rashes, induration  Neuro: no photophobia or meningeal signs    Assessment:        Hospital Problems           Last Modified POA    * (Principal) Sepsis without acute organ dysfunction ([address] 10/28/2020 Yes    Normocytic anemia due to blood loss 10/28/2020 Yes    Overview Signed 2020  9:30 AM by Elyse Rudolph, DO     Hgb 9.4 ()         S/P primary low transverse  (Chronic) 10/28/2020 Yes    Gestational hypertension (G1) 10/28/2020 Yes    Colitis 10/28/2020 Yes    Hypokalemia 10/28/2020 Yes    Acute nonintractable headache 10/28/2020 Yes    Generalized abdominal pain 10/28/2020 Yes          Plan:        #Enterocolitis  -pro-stefania elevated. Abx were discontinued yesterday. I will likely discharge her on cipro/flagyl x 7 days  -Obtain stool gastrointestinal panel, C.  Difficile  -IV fluid resuscitation  -Clear liquid diet, advance as tolerated  -I suggest evaluation by GI as on outpatient to rule out underlying IBD, especially with pt having previous GI bleeding in 2018     #Gestational hypertension  -Blood pressure currently well controlled     #Status post emergent   -Followed by OB/GYN, no further work-up necessary in the hospital     #Anemia  -Chronic, continue iron supplementation     #Hypokalemia  -Replete per protocol    #Headache  - either related to new mirena IUD or active viral gastroenteritis  - trial compazine, benadryl    #Arrange for discharge this afternoon pending continued clinical improvement     Jon Sellers PA-C  10/29/2020  10:58 AM

## 2020-10-29 NOTE — PLAN OF CARE
Problem: Pain:  Goal: Pain level will decrease  Description: Pain level will decrease  10/29/2020 1138 by Eboni Storm RN  Outcome: Ongoing  10/29/2020 0427 by Alexis Corral RN  Outcome: Ongoing  Goal: Control of acute pain  Description: Control of acute pain  10/29/2020 1138 by Eboni Storm RN  Outcome: Ongoing  10/29/2020 0427 by Alexis Corral RN  Outcome: Ongoing  Goal: Control of chronic pain  Description: Control of chronic pain  10/29/2020 1138 by Eboni Storm RN  Outcome: Ongoing  10/29/2020 0427 by Alexis Corral RN  Outcome: Ongoing

## 2020-10-30 LAB
C TRACH DNA GENITAL QL NAA+PROBE: NEGATIVE
N. GONORRHOEAE DNA: ABNORMAL
SPECIMEN DESCRIPTION: ABNORMAL

## 2020-11-02 ENCOUNTER — TELEPHONE (OUTPATIENT)
Dept: PHARMACY | Age: 25
End: 2020-11-02

## 2020-11-02 NOTE — TELEPHONE ENCOUNTER
CLINICAL PHARMACY NOTE:  Telephone Follow-up for Positive STD Test    At the time of Selina White's [facility] on 10/27/20 STD testing was performed. DNA testing was positive for Gonorrhea. Attempt made to reach patient by telephone to review results and need to start antibiotic therapy. Patient's VM not working. Unable to reach patient by phone. Sent letter to patient on 11/2/20 regarding need to receive an injection to treat the infection. Advised patient to go to the Health Department, their local urgent care/ED, or return to the [facility] Emergency Department for treatment. [address] [place] [address]  Ph., CACP, Clinical Pharmacist  Anticoagulation Services, [address] [place] [address] [facility] Clinic  11/2/2020  2:15 PM

## 2020-11-03 ENCOUNTER — TELEPHONE (OUTPATIENT)
Dept: INTERNAL MEDICINE | Age: 25
End: 2020-11-03

## 2020-11-03 LAB
CULTURE: NORMAL
CULTURE: NORMAL
Lab: NORMAL
Lab: NORMAL
SPECIMEN DESCRIPTION: NORMAL
SPECIMEN DESCRIPTION: NORMAL

## 2020-11-03 NOTE — TELEPHONE ENCOUNTER
[facility] Transitions Initial Follow Up Call    Call within 2 business days of discharge: Yes     Patient: Baron Rubalcava Patient : 1995 MRN: [id number]    [unfilled]    RARS: Readmission Risk Score: 15       Spoke with: Jada Sommers, pt states she is taking all of her D/C meds and is doing much better , writer relayed the message from the pharmacist that attempted to contact her yesterday. Pt stated she would return to the ED to receive the additional medication. Discharge department/facility: [facility]    Non-face-to-face services provided:  Scheduled appointment with PCP-2020 virtual visit   Obtained and reviewed discharge summary and/or continuity of care documents  Reviewed and followed up on pending diagnostic tests and treatments-see above note.     Follow Up  Future Appointments   Date Time Provider Kain Ibarra   2020  1:00 PM Jorge Alberto Branham MD [facility]   12/3/2020  2:45 PM Luciana Pierce DO [facility] Marlene Holloway RN

## 2020-11-13 ENCOUNTER — VIRTUAL VISIT (OUTPATIENT)
Dept: INTERNAL MEDICINE | Age: 25
End: 2020-11-13
Payer: COMMERCIAL

## 2020-11-13 PROBLEM — Z87.19 HISTORY OF GI BLEED: Status: RESOLVED | Noted: 2018-04-16 | Resolved: 2020-11-13

## 2020-11-13 PROBLEM — Z30.430 ENCOUNTER FOR IUD INSERTION: Status: RESOLVED | Noted: 2020-10-21 | Resolved: 2020-11-13

## 2020-11-13 PROBLEM — Z92.89 HISTORY OF BLOOD TRANSFUSION: Status: RESOLVED | Noted: 2020-03-02 | Resolved: 2020-11-13

## 2020-11-13 PROBLEM — Z09 HOSPITAL DISCHARGE FOLLOW-UP: Status: RESOLVED | Noted: 2020-11-13 | Resolved: 2020-11-13

## 2020-11-13 PROBLEM — A41.9 SEPSIS WITHOUT ACUTE ORGAN DYSFUNCTION (HCC): Status: RESOLVED | Noted: 2020-10-28 | Resolved: 2020-11-13

## 2020-11-13 PROBLEM — N88.8 NABOTHIAN CYST: Status: RESOLVED | Noted: 2020-03-17 | Resolved: 2020-11-13

## 2020-11-13 PROBLEM — K52.9 ENTEROCOLITIS: Status: RESOLVED | Noted: 2020-10-28 | Resolved: 2020-11-13

## 2020-11-13 PROBLEM — Z09 HOSPITAL DISCHARGE FOLLOW-UP: Status: ACTIVE | Noted: 2020-11-13

## 2020-11-13 PROCEDURE — 99442 PR PHYS/QHP TELEPHONE EVALUATION 11-20 MIN: CPT | Performed by: STUDENT IN AN ORGANIZED HEALTH CARE EDUCATION/TRAINING PROGRAM

## 2020-11-13 PROCEDURE — 1111F DSCHRG MED/CURRENT MED MERGE: CPT | Performed by: STUDENT IN AN ORGANIZED HEALTH CARE EDUCATION/TRAINING PROGRAM

## 2020-11-13 ASSESSMENT — ENCOUNTER SYMPTOMS
NAUSEA: 0
ABDOMINAL PAIN: 0
VOMITING: 0
ABDOMINAL DISTENTION: 0
DIARRHEA: 0

## 2020-11-13 NOTE — PROGRESS NOTES
2020    TELEHEALTH EVALUATION -- Audio/Visual (During TRAWO-22 public health emergency)    HPI:    Feliciano Castañeda (:  1995) has requested an audio/video evaluation for the following concern(s):    Hospital follow up and 1 month follow-up for Gestational HTN    Pt was admitted to hospital with abdominal pain, diarrhea. Diagnosed initially to be viral gastroenteritis as evidenced by CT abdomen but treated with Cipro and Flagyl x 7 days due to elevated Procal. Discharged 10/29/20    Pt feels better today. No symptoms. Did nor get her BP kit yet and has not started checking at home. BP during hospital admission was in 120s/80s. Hb 8.6, post pregnancy. On iron tablets  K 3.5 during admission. Advised about adequate nutrition  She was going to get vaccines at [facility] aid      Review of Systems   Constitutional: Negative for activity change, chills and fever. HENT: Negative for congestion. Eyes: Negative for visual disturbance. Cardiovascular: Negative for chest pain. Gastrointestinal: Negative for abdominal distention, abdominal pain, diarrhea, nausea and vomiting. Endocrine: Negative for polyuria. Genitourinary: Negative for decreased urine volume and difficulty urinating. Musculoskeletal: Negative for arthralgias. Neurological: Negative for dizziness and seizures. Psychiatric/Behavioral: Negative for agitation and confusion. Prior to Visit Medications    Medication Sig Taking?  Authorizing Provider   ferrous sulfate (FE TABS) 325 (65 Fe) MG EC tablet Take 1 tablet by mouth 2 times daily Yes Arya Stark DO   butalbital-acetaminophen-caffeine (FIORICET, ESGIC) -40 MG per tablet Take 1 tablet by mouth every 4 hours as needed for Headaches  Yudith Trevino PA-C   Blood Pressure KIT 1 Device by Does not apply route 2 times daily  Patient not taking: Reported on 10/21/2020  Ananda Gutierrez MD       Social History     Tobacco Use    Smoking status: Never Smoker    Smokeless tobacco: Never Used   Substance Use Topics    Alcohol use: No    Drug use: No        PHYSICAL EXAMINATION:  [ INSTRUCTIONS:  \"[x]\" Indicates a positive item  \"[]\" Indicates a negative item  -- DELETE ALL ITEMS NOT EXAMINED]  Vital Signs: (As obtained by patient/caregiver or practitioner observation)    Blood pressure- does not check Heart rate-    Respiratory rate-    Temperature-  Pulse oximetry-     Constitutional: [x] Appears well-developed and well-nourished [] No apparent distress      [] Abnormal-   Mental status  [x] Alert and awake  [] Oriented to person/place/time []Able to follow commands      Eyes:  EOM    [x]  Normal  [] Abnormal-  Sclera  []  Normal  [] Abnormal -         Discharge []  None visible  [] Abnormal -    HENT:   [x] Normocephalic, atraumatic. [] Abnormal   [] Mouth/Throat: Mucous membranes are moist.     External Ears [x] Normal  [] Abnormal-     Neck: [x] No visualized mass     Pulmonary/Chest: [x] Respiratory effort normal.  [] No visualized signs of difficulty breathing or respiratory distress        [] Abnormal-      Musculoskeletal:   [] Normal gait with no signs of ataxia         [] Normal range of motion of neck        [] Abnormal-       Neurological:        [x] No Facial Asymmetry (Cranial nerve 7 motor function) (limited exam to video visit)          [] No gaze palsy        [] Abnormal-         Skin:        [x] No significant exanthematous lesions or discoloration noted on facial skin         [] Abnormal-            Psychiatric:       [x] Normal Affect [] No Hallucinations        [] Abnormal-       ASSESSMENT/PLAN:  1. Hospital discharge follow-up for Gastroenteritis  Meds reconciled. Doing better. No complaints. 2. Gestational hypertension, second trimester  No HTN during hospital stay  BP check at home    3. Normocytic anemia due to blood loss  Ferrous tablets.        Return in about 4 weeks (around 12/11/2020), or if symptoms worsen or fail to improve, for Gestational HTN. Meg Main is a 21 y.o. female being evaluated by a Virtual Visit (video visit) encounter to address concerns as mentioned above. A caregiver was present when appropriate. Due to this being a TeleHealth encounter (During EADOJ-34 public health emergency), evaluation of the following organ systems was limited: Vitals/Constitutional/EENT/Resp/CV/GI//MS/Neuro/Skin/Heme-Lymph-Imm. Pursuant to the emergency declaration under the [address] [place] [address], [address] [place] [address] and the [employer] Act, this Virtual Visit was conducted with patient's (and/or legal guardian's) consent, to reduce the patient's risk of exposure to COVID-19 and provide necessary medical care. The patient (and/or legal guardian) has also been advised to contact this office for worsening conditions or problems, and seek emergency medical treatment and/or call 911 if deemed necessary. Patient identification was verified at the start of the visit: Yes    Total time spent on this encounter: 15 minutes    Services were provided through a video synchronous discussion virtually to substitute for in-person clinic visit. Patient and provider were located at their individual homes. --Brian Torre MD on 11/13/2020 at 1:23 PM    An electronic signature was used to authenticate this note.

## 2020-11-13 NOTE — PROGRESS NOTES
Attending Physician Statement  I have discussed the care of [address], including pertinent history and exam findings with the resident. I have reviewed the key elements of all parts of the encounter with the resident. I agree with the assessment, and status of the problem list as documented. Diagnosis Orders   1. Hospital discharge follow-up  IN DISCHARGE MEDS RECONCILED W/ CURRENT OUTPATIENT MED LIST   2. Gestational hypertension, second trimester  IN DISCHARGE MEDS RECONCILED W/ CURRENT OUTPATIENT MED LIST   3. Normocytic anemia due to blood loss  IN DISCHARGE MEDS RECONCILED W/ CURRENT OUTPATIENT MED LIST      The plan and orders should include   Orders Placed This Encounter   Procedures    IN DISCHARGE MEDS RECONCILED W/ CURRENT OUTPATIENT MED LIST    and this was also documented by the resident.    Needs to record BP and vv in 1 month with #s    Dr Carissa Mary MD, [address]  Associate , Department of Internal Medicine  Resident Ambulatory Site Medical Director  [address] [place] [address] Internal Medicine  [facility]  Internal Medicine Clerkship - Selene Hester    11/13/2020, 2:00 PM

## 2021-01-04 ENCOUNTER — HOSPITAL ENCOUNTER (EMERGENCY)
Age: 26
Discharge: HOME | End: 2021-01-04
Attending: EMERGENCY MEDICINE
Payer: COMMERCIAL

## 2021-01-04 VITALS
BODY MASS INDEX: 29.02 KG/M2 | HEIGHT: 64 IN | WEIGHT: 170 LBS | OXYGEN SATURATION: 100 % | RESPIRATION RATE: 16 BRPM | SYSTOLIC BLOOD PRESSURE: 129 MMHG | HEART RATE: 93 BPM | TEMPERATURE: 98.5 F | DIASTOLIC BLOOD PRESSURE: 70 MMHG

## 2021-01-04 DIAGNOSIS — Z20.822 SUSPECTED COVID-19 VIRUS INFECTION: Primary | ICD-10-CM

## 2021-01-04 LAB
DIRECT EXAM: NORMAL
Lab: NORMAL
SPECIMEN DESCRIPTION: NORMAL

## 2021-01-04 PROCEDURE — 99283 EMERGENCY DEPT VISIT LOW MDM: CPT

## 2021-01-04 PROCEDURE — 87880 STREP A ASSAY W/OPTIC: CPT

## 2021-01-04 PROCEDURE — U0003 INFECTIOUS AGENT DETECTION BY NUCLEIC ACID (DNA OR RNA); SEVERE ACUTE RESPIRATORY SYNDROME CORONAVIRUS 2 (SARS-COV-2) (CORONAVIRUS DISEASE [COVID-19]), AMPLIFIED PROBE TECHNIQUE, MAKING USE OF HIGH THROUGHPUT TECHNOLOGIES AS DESCRIBED BY CMS-2020-01-R: HCPCS

## 2021-01-04 RX ORDER — ALBUTEROL SULFATE 90 UG/1
2 AEROSOL, METERED RESPIRATORY (INHALATION) EVERY 4 HOURS PRN
Qty: 1 INHALER | Refills: 0 | Status: SHIPPED | OUTPATIENT
Start: 2021-01-04 | End: 2021-06-03

## 2021-01-04 RX ORDER — BENZONATATE 100 MG/1
100 CAPSULE ORAL 3 TIMES DAILY PRN
Qty: 30 CAPSULE | Refills: 0 | Status: SHIPPED | OUTPATIENT
Start: 2021-01-04 | End: 2021-01-11

## 2021-01-04 ASSESSMENT — PAIN SCALES - GENERAL: PAINLEVEL_OUTOF10: 7

## 2021-01-05 ENCOUNTER — CARE COORDINATION (OUTPATIENT)
Dept: FAMILY MEDICINE CLINIC | Age: 26
End: 2021-01-05

## 2021-01-05 LAB
SARS-COV-2, RAPID: NORMAL
SARS-COV-2: NORMAL
SARS-COV-2: NOT DETECTED
SOURCE: NORMAL

## 2021-01-05 NOTE — ED PROVIDER NOTES
[address] [place] [address] ED  [facility]      Pt Name: Claudene Janus  MRN: [id number]  [place] 1995  Date ofevaluation: 2021  Provider: Billie Kim PA-C    CHIEF COMPLAINT       Chief Complaint   Patient presents with    Cough    Pharyngitis         HISTORY OF PRESENT ILLNESS  (Location/Symptom, Timing/Onset, Context/Setting, Quality, Duration, Modifying Factors, Severity.)   Claudene Janus is a 31 y.o. female who presents to the emergency department with cough and sore throat that started a couple days ago. Patient states that she works for the [employer] system at her job told her to come to the ER to be evaluated. Patient denies any fevers or chills. No shortness of breath. No nausea or vomiting. No definite alleviating aggravating factors. Her job is concerned about Covid. There has been some coworkers with COVID-19 exposure steroids with the patient. Nursing Notes were reviewed.     ALLERGIES     Motrin [ibuprofen]    CURRENT MEDICATIONS       Previous Medications    BLOOD PRESSURE KIT    1 Device by Does not apply route 2 times daily    BUTALBITAL-ACETAMINOPHEN-CAFFEINE (FIORICET, ESGIC) -40 MG PER TABLET    Take 1 tablet by mouth every 4 hours as needed for Headaches    FERROUS SULFATE (FE TABS) 325 (65 FE) MG EC TABLET    Take 1 tablet by mouth 2 times daily       PAST MEDICAL HISTORY         Diagnosis Date    Gestational hypertension (G1) 2020    History of blood transfusion 2018    From Motrin use     Hx of blood transfusion (2018) 3/2/2020    Due to GI bleed secondary to Motrin use    Menometrorrhagia 5/10/2018    Symptomatic anemia 2018    From Motrin use     Vision abnormalities        SURGICAL HISTORY           Procedure Laterality Date     SECTION N/A 2020     SECTION performed by Savanna Holland DO at hospitals L&D OR    COLONOSCOPY  2018    No lesions seen    W[place] COLON CA SCRN NOT HI RSK IND N/A 2018 COLONOSCOPY performed by Daniel Koenig MD at [facility]  2018    No lesions seen up to the 2nd part of the duodenum.  UPPER GASTROINTESTINAL ENDOSCOPY N/A 2018    EGD DIAGNOSTIC ONLY performed by Daniel Koenig MD at [place] [address] HISTORY           Problem Relation Age of Onset    No Known Problems Mother     No Known Problems Father     Other Maternal Grandmother         diverticulitis    No Known Problems Paternal Grandfather     No Known Problems Paternal Grandmother     No Known Problems Maternal Grandfather     No Known Problems Brother     No Known Problems Sister     Breast Cancer Neg Hx     Cancer Neg Hx     Colon Cancer Neg Hx     Diabetes Neg Hx     Eclampsia Neg Hx     Hypertension Neg Hx     Ovarian Cancer Neg Hx      Labor Neg Hx     Spont Abortions Neg Hx     Stroke Neg Hx      Family Status   Relation Name Status    Mother  Alive    Father  Alive    MGM  Alive    PGF  Alive    PGM  Alive    MGF      Brother x4 Alive    Sister x2 [de-identified]    Neg Hx  (Not Specified)        SOCIAL HISTORY      reports that she has never smoked. She has never used smokeless tobacco. She reports that she does not drink alcohol or use drugs. REVIEW OFSYSTEMS    (2-9 systems for level 4, 10 or more for level 5)   Review of Systems    Except as noted above the remainder of the review of systems was reviewed and negative. PHYSICAL EXAM    (up to 7 for level 4, 8 or more for level 5)     ED Triage Vitals [21]   BP Temp Temp Source Pulse Resp SpO2 Height Weight   129/70 98.5 °F (36.9 °C) Oral 93 16 100 % 5' 4\" (1.626 m) 170 lb (77.1 kg)      Physical Exam  Constitutional:       Appearance: She is well-developed. HENT:      Head: Normocephalic and atraumatic. Neck:      Musculoskeletal: Normal range of motion and neck supple. Cardiovascular:      Rate and Rhythm: Normal rate and regular rhythm. 01/04/2021 09:26:25 PM      PATIENTREFERRED TO:   Leighann Zepeda MD  [address] [facility]  [address] [phone]    In 3 days        DISCHARGE MEDICATIONS:     New Prescriptions    ALBUTEROL SULFATE HFA (PROAIR HFA) 108 (90 BASE) MCG/ACT INHALER    Inhale 2 puffs into the lungs every 4 hours as needed for Wheezing    BENZONATATE (TESSALON PERLES) 100 MG CAPSULE    Take 1 capsule by mouth 3 times daily as needed for Cough           (Please note that portions of this note were completed with a voice recognition program.  Efforts were made to edit thedictations but occasionally words are mis-transcribed.)    DANIELLA Meeks PA-C  01/04/21 0363       Elenita Sellers MD  01/06/21 9143

## 2021-01-07 ENCOUNTER — OFFICE VISIT (OUTPATIENT)
Dept: OBGYN | Age: 26
End: 2021-01-07
Payer: COMMERCIAL

## 2021-01-07 VITALS
WEIGHT: 168.5 LBS | HEART RATE: 82 BPM | HEIGHT: 64 IN | BODY MASS INDEX: 28.77 KG/M2 | SYSTOLIC BLOOD PRESSURE: 142 MMHG | DIASTOLIC BLOOD PRESSURE: 91 MMHG

## 2021-01-07 DIAGNOSIS — Z97.5 IUD (INTRAUTERINE DEVICE) IN PLACE: Primary | ICD-10-CM

## 2021-01-07 PROCEDURE — 99211 OFF/OP EST MAY X REQ PHY/QHP: CPT | Performed by: STUDENT IN AN ORGANIZED HEALTH CARE EDUCATION/TRAINING PROGRAM

## 2021-01-07 PROCEDURE — 99213 OFFICE O/P EST LOW 20 MIN: CPT | Performed by: STUDENT IN AN ORGANIZED HEALTH CARE EDUCATION/TRAINING PROGRAM

## 2021-01-07 RX ORDER — METRONIDAZOLE 500 MG/1
500 TABLET ORAL 2 TIMES DAILY
Qty: 14 TABLET | Refills: 0 | Status: SHIPPED | OUTPATIENT
Start: 2021-01-07 | End: 2021-01-14

## 2021-01-07 ASSESSMENT — PATIENT HEALTH QUESTIONNAIRE - PHQ9
1. LITTLE INTEREST OR PLEASURE IN DOING THINGS: 1
SUM OF ALL RESPONSES TO PHQ QUESTIONS 1-9: 2

## 2021-01-07 NOTE — PROGRESS NOTES
OB/GYN Problem Visit    Marily Clancy  2021                       Primary Care Physician: Rock Nix MD    CC:   Chief Complaint   Patient presents with    Follow-up     follow up on IUD         HPI: Marily Clancy is a 31 y.o. female     The patient was seen and examined. She is here for IUD string check. Patient had a Mirena IUD placed 10/21/10 for dysmenorrhea. She reports her cramps have improved but she is still having irregular spotting. She also reports foul-smelling, clear, watery vaginal discharge for the past weeks since she switched her laundry detergent to Gain. She has had bacterial vaginosis in the past after switching detergents and reports this is similar. Her No LMP recorded. Patient has had an implant.     REVIEW OF SYSTEMS:   Constitutional: negative fever, negative chills  HEENT: negative visual disturbances, negative headaches  Respiratory: negative dyspnea, negative cough  Cardiovascular: negative chest pain,  negative palpitations  Gastrointestinal: negative abdominal pain, negative RUQ pain, negative N/V, negative diarrhea, negative constipation  Genitourinary: negative dysuria, negative vaginal discharge  Dermatological: negative rash, negative wounds  Hematologic: negative bleeding/clotting disorder  Immunologic: negative recent illness, negative recent sick contact, negative allergic reactions  Lymphatic: negative lymph nodes  Musculoskeletal: negative back pain, negative myalgias, negative arthralgias  Neurological:  negative dizziness, negative weakness  Behavior/Psych: negative depression, negative anxiety  __________________________________    OBSTETRICAL HISTORY:  OB History    Para Term  AB Living   1 1 1 0 0 1   SAB TAB Ectopic Molar Multiple Live Births   0 0 0   0 1      # Outcome Date GA Lbr Jonh/2nd Weight Sex Delivery Anes PTL Lv   1 Term 20 37w2d  5 lb 14 oz (2.665 kg) F CS-LTranv EPI, Spinal N MARGOT Complications: Fetal Intolerance       PAST MEDICAL HISTORY:      Diagnosis Date    Gestational hypertension (G1) 2020    History of blood transfusion 2018    From Motrin use     Hx of blood transfusion (2018) 3/2/2020    Due to GI bleed secondary to Motrin use    Menometrorrhagia 5/10/2018    Symptomatic anemia 2018    From Motrin use     Vision abnormalities        PAST SURGICAL HISTORY:                                                                    Procedure Laterality Date     SECTION N/A 2020     SE[facility]ung [facility]    COLONOSCOPY  2018    No lesions seen    [place] COLON CA SCRN NOT HI RSK IND N/A 2018    COLONOSCOPY performed [facility]jo Johns MD at  [facility]  2018    No lesions seen up to the 2nd part of the duodenum.     UPPER GASTROINTESTINAL ENDOSCOPY N/A 2018    EGD DIAGNOSTIC ONLY performed by Maryjo Johns MD at [facility]:  Current Outpatient Medications   Medication Sig Dispense Refill    metroNIDAZOLE (FLAGYL) 500 MG tablet Take 1 tablet by mouth 2 times daily for 7 days 14 tablet 0    albuterol sulfate HFA (PROAIR HFA) 108 (90 Base) MCG/ACT inhaler Inhale 2 puffs into the lungs every 4 hours as needed for Wheezing (Patient not taking: Reported on 2021) 1 Inhaler 0    benzonatate (TESSALON PERLES) 100 MG capsule Take 1 capsule by mouth 3 times daily as needed for Cough (Patient not taking: Reported on 2021) 30 capsule 0    butalbital-acetaminophen-caffeine (FIORICET, ESGIC) -40 MG per tablet Take 1 tablet by mouth every 4 hours as needed for Headaches 18 tablet 0    Blood Pressure KIT 1 Device by Does not apply route 2 times daily (Patient not taking: Reported on 10/21/2020) 1 kit 0 Urinary system: urethral meatus normal and bladder not palpable  Vaginal: normal mucosa, no discharge  Cervix: normal appearing cervix without discharge or lesions, IUD strings visualized     DATA:  No results found for this visit on 21. ASSESSMENT & PLAN:    Brooke Hernandez is a 31 y.o. female  here for IUD string check    - Afebrile, VSS    - Mirena IUD strings in place    - Remove Mirena IUD in 2025   - Follow up in two months if irregular beeding has not improved    - Continue barrier protection for STD prevention    - RTO in March for annual     Bacterial vaginosis    - Rx for Flagyl sent to pharmacy      Patient Active Problem List    Diagnosis Date Noted    Encounter for insertion of mirena IUD 10/21/2020     IUD inserted 10/21/20  Lot [id number]    Remove 10/21/2025      Gestational hypertension (G1) 2020    S/P primary low transverse      Normocytic anemia due to blood loss 2020     Hgb 9.4 ()      Family history of sickle cell trait (Pt negative) 2020     Maunt          Return in about 2 months (around 3/7/2021) for Annual Exam.    Counseling Completed:  discussed birth control and barrier recommendations. discussed STD counseling and prevention. Routine health maintenance per patients PCP discussed. Patient was seen with total face to face time of 15 minutes. More than 50% of this visit was on counseling and education regarding her diagnose(s) as listed below and options. She was also counseled on her preventative health maintenance recommendations and follow-up. Diagnosis Orders   1.  IUD (intrauterine device) in place         Xander Geronimo DO  Ob/Gyn Resident  [facility], [address]  2021, 4:48 PM-yobany

## 2021-02-20 ENCOUNTER — HOSPITAL ENCOUNTER (EMERGENCY)
Age: 26
Discharge: HOME | End: 2021-02-20
Attending: EMERGENCY MEDICINE
Payer: COMMERCIAL

## 2021-02-20 VITALS
TEMPERATURE: 101.1 F | WEIGHT: 165 LBS | DIASTOLIC BLOOD PRESSURE: 86 MMHG | OXYGEN SATURATION: 100 % | SYSTOLIC BLOOD PRESSURE: 138 MMHG | HEART RATE: 124 BPM | HEIGHT: 64 IN | BODY MASS INDEX: 28.17 KG/M2 | RESPIRATION RATE: 16 BRPM

## 2021-02-20 DIAGNOSIS — J02.9 ACUTE PHARYNGITIS, UNSPECIFIED ETIOLOGY: Primary | ICD-10-CM

## 2021-02-20 LAB
DIRECT EXAM: NORMAL
Lab: NORMAL
SPECIMEN DESCRIPTION: NORMAL

## 2021-02-20 PROCEDURE — 99283 EMERGENCY DEPT VISIT LOW MDM: CPT

## 2021-02-20 PROCEDURE — 87880 STREP A ASSAY W/OPTIC: CPT

## 2021-02-20 PROCEDURE — 6360000002 HC RX W HCPCS: Performed by: EMERGENCY MEDICINE

## 2021-02-20 RX ORDER — ACETAMINOPHEN 325 MG/1
650 TABLET ORAL EVERY 6 HOURS PRN
Qty: 120 TABLET | Refills: 3 | Status: SHIPPED | OUTPATIENT
Start: 2021-02-20 | End: 2022-02-18

## 2021-02-20 RX ORDER — AZITHROMYCIN 250 MG/1
250 TABLET, FILM COATED ORAL SEE ADMIN INSTRUCTIONS
Qty: 6 TABLET | Refills: 0 | Status: SHIPPED | OUTPATIENT
Start: 2021-02-20 | End: 2021-02-25

## 2021-02-20 RX ORDER — DEXAMETHASONE SODIUM PHOSPHATE 10 MG/ML
10 INJECTION, SOLUTION INTRAMUSCULAR; INTRAVENOUS ONCE
Status: COMPLETED | OUTPATIENT
Start: 2021-02-20 | End: 2021-02-20

## 2021-02-20 RX ADMIN — DEXAMETHASONE SODIUM PHOSPHATE 10 MG: 10 INJECTION, SOLUTION INTRAMUSCULAR; INTRAVENOUS at 14:59

## 2021-02-20 ASSESSMENT — ENCOUNTER SYMPTOMS: SORE THROAT: 1

## 2021-02-20 NOTE — ED PROVIDER NOTES
EMERGENCY DEPARTMENT ENCOUNTER    Pt Name: Sourav Brown  MRN: [id number]  [place] 1995  Date of evaluation: 21  CHIEF COMPLAINT       Chief Complaint   Patient presents with    Pharyngitis     onset 2 days    Otalgia     bilat     HISTORY OF PRESENT ILLNESS   This is a 23-year-old female that presents with complaints of a sore throat that began a few days ago. Patient describes severe pain and discomfort in her throat, aggravated by eating, without any alleviating factors. Patient denies any cough or shortness of breath, she denies any fevers or chills, she has no abdominal pain nausea or vomiting. She describes her symptoms as severe. REVIEW OF SYSTEMS     Review of Systems   HENT: Positive for sore throat. All other systems reviewed and are negative. PASTMEDICAL HISTORY     Past Medical History:   Diagnosis Date    Gestational hypertension (G1) 2020    History of blood transfusion 2018    From Motrin use     Hx of blood transfusion (2018) 3/2/2020    Due to GI bleed secondary to Motrin use    Menometrorrhagia 5/10/2018    Symptomatic anemia 2018    From Motrin use     Vision abnormalities      Past Problem List  Patient Active Problem List   Diagnosis Code    Family history of sickle cell trait (Pt negative) Z83.2    Normocytic anemia due to blood loss D50.0    S/P primary low transverse  Z98.891    Gestational hypertension (G1) O13.9    Encounter for insertion of mirena IUD Z30.430     SURGICAL HISTORY       Past Surgical History:   Procedure Laterality Date     SECTION N/A 2020     SE[facility] Sulma [facility]    COLONOSCOPY  2018    No lesions seen    [place] COLON CA SCRN NOT HI RSK IND N/A 2018    COLONOSCOPY performed by Sue Kaur MD at [facility].  2018    No lesions seen up to the 2nd part of the duodenum.     UPPER GASTROINTESTINAL ENDOSCOPY N/A 2018    EGD DIAGNOSTIC ONLY performed by Serafin Reyes MD at [facility]       Previous Medications    ALBUTEROL SULFATE HFA (PROAIR HFA) 108 (90 BASE) MCG/ACT INHALER    Inhale 2 puffs into the lungs every 4 hours as needed for Wheezing    BLOOD PRESSURE KIT    1 Device by Does not apply route 2 times daily    BUTALBITAL-ACETAMINOPHEN-CAFFEINE (FIORICET, ESGIC) -40 MG PER TABLET    Take 1 tablet by mouth every 4 hours as needed for Headaches    FERROUS SULFATE (FE TABS) 325 (65 FE) MG EC TABLET    Take 1 tablet by mouth 2 times daily     ALLERGIES     is allergic to motrin [ibuprofen]. FAMILY HISTORY     She indicated that her mother is alive. She indicated that her father is alive. She indicated that her sister is alive. She indicated that her brother is alive. She indicated that her maternal grandmother is alive. She indicated that her maternal grandfather is . She indicated that her paternal grandmother is alive. She indicated that her paternal grandfather is alive. She indicated that the status of her neg hx is unknown. SOCIAL HISTORY       Social History     Tobacco Use    Smoking status: Never Smoker    Smokeless tobacco: Never Used   Substance Use Topics    Alcohol use: No    Drug use: No     PHYSICAL EXAM     INITIAL VITALS: /86   Pulse 124   Temp 101.1 °F (38.4 °C) (Oral)   Resp 16   Ht 5' 4\" (1.626 m)   Wt 165 lb (74.8 kg)   LMP 2021   SpO2 100%   BMI 28.32 kg/m²    Physical Exam  Constitutional:       Appearance: Normal appearance. HENT:      Head: Normocephalic and atraumatic. Mouth/Throat:      Pharynx: Pharyngeal swelling, oropharyngeal exudate and posterior oropharyngeal erythema present. Tonsils: Tonsillar exudate present. No tonsillar abscesses. Eyes:      Extraocular Movements: Extraocular movements intact. Pupils: Pupils are equal, round, and reactive to light.    Cardiovascular:      Rate DISPOSITION Decision To Discharge 02/20/2021 02:47:40 PM      PATIENT REFERRED TO:  Kip Minaya MD  [address] [id number] [phone]    Schedule an appointment as soon as possible for a visit in 2 days      DISCHARGE MEDICATIONS:  New Prescriptions    ACETAMINOPHEN (TYLENOL) 325 MG TABLET    Take 2 tablets by mouth every 6 hours as needed for Pain or Fever    AZITHROMYCIN (ZITHROMAX) 250 MG TABLET    Take 1 tablet by mouth See Admin Instructions for 5 days 500mg on day 1 followed by 250mg on days 2 - 5     Janak Corral MD  Attending Emergency Physician                   Janak Corral MD  02/20/21 [id number]

## 2021-04-30 ENCOUNTER — OFFICE VISIT (OUTPATIENT)
Dept: INTERNAL MEDICINE | Age: 26
End: 2021-04-30
Payer: COMMERCIAL

## 2021-04-30 VITALS
HEIGHT: 64 IN | BODY MASS INDEX: 28.85 KG/M2 | HEART RATE: 83 BPM | WEIGHT: 169 LBS | DIASTOLIC BLOOD PRESSURE: 94 MMHG | SYSTOLIC BLOOD PRESSURE: 148 MMHG

## 2021-04-30 DIAGNOSIS — F32.2 CURRENT SEVERE EPISODE OF MAJOR DEPRESSIVE DISORDER WITHOUT PSYCHOTIC FEATURES WITHOUT PRIOR EPISODE (HCC): ICD-10-CM

## 2021-04-30 DIAGNOSIS — I15.9 SECONDARY HYPERTENSION: Primary | ICD-10-CM

## 2021-04-30 DIAGNOSIS — D64.9 NORMOCYTIC ANEMIA: ICD-10-CM

## 2021-04-30 PROBLEM — F32.A DEPRESSION: Status: ACTIVE | Noted: 2021-04-30

## 2021-04-30 PROCEDURE — 99211 OFF/OP EST MAY X REQ PHY/QHP: CPT | Performed by: INTERNAL MEDICINE

## 2021-04-30 PROCEDURE — 99214 OFFICE O/P EST MOD 30 MIN: CPT | Performed by: STUDENT IN AN ORGANIZED HEALTH CARE EDUCATION/TRAINING PROGRAM

## 2021-04-30 PROCEDURE — 1036F TOBACCO NON-USER: CPT | Performed by: STUDENT IN AN ORGANIZED HEALTH CARE EDUCATION/TRAINING PROGRAM

## 2021-04-30 PROCEDURE — G8427 DOCREV CUR MEDS BY ELIG CLIN: HCPCS | Performed by: STUDENT IN AN ORGANIZED HEALTH CARE EDUCATION/TRAINING PROGRAM

## 2021-04-30 PROCEDURE — G8417 CALC BMI ABV UP PARAM F/U: HCPCS | Performed by: STUDENT IN AN ORGANIZED HEALTH CARE EDUCATION/TRAINING PROGRAM

## 2021-04-30 ASSESSMENT — ENCOUNTER SYMPTOMS
SHORTNESS OF BREATH: 0
ABDOMINAL DISTENTION: 0
BACK PAIN: 0
CONSTIPATION: 0
ABDOMINAL PAIN: 0
COUGH: 0
CHEST TIGHTNESS: 0

## 2021-04-30 NOTE — PROGRESS NOTES
Attending Physician Statement  I have discussed the care of [address] including pertinent history and exam findings,  with the resident. I have reviewed the key elements of all parts of the encounter with the resident. I agree with the assessment, plan and orders as documented by the resident.   (GE Modifier)    MD GUICHO Zaidi  Attending Physician, [address] [place] [address], Internal Medicine Residency Program  [address] [place] [address]  4/30/2021, 2:58 PM

## 2021-04-30 NOTE — PATIENT INSTRUCTIONS
Follow-up appointment scheduled for 6/3/21 at 3:30p, AVS given to patient. Labs given to patient, they will have them done before their next visit.      Referral to psychology was placed, summary of care printed and faxed to office, phone numbers given to pt, they will contact office for an appt      jw

## 2021-04-30 NOTE — PROGRESS NOTES
Gestational period. SYLVESTER screening negative. Recent labs with hypokalemia. Will check TSH, UA, urine protein, urine potassium, serum aldosterone, serum renin and cortisol. Normocytic anemia: Recheck CBC, CMP, iron, folic acid, K09 and TSH. Pertinent screening:  Colon cancer: Not indicated  Osteoporosis: Not indicated  Cervical cancer:  normal.  Breast cancer: Not indicated  Lung cancer screening: Not indicated, non-smoker. Depression screening: Positive. Vaccinations: Counseled on Covid vaccination. Deferred at this time. Life-style:  Smoking: Never smoker. Alcohol: Denies      Patient Active Problem List   Diagnosis    Family history of sickle cell trait (Pt negative)    Normocytic anemia due to blood loss    S/P primary low transverse     Gestational hypertension (G1)    Encounter for insertion of mirena IUD    Normocytic anemia    Secondary hypertension    Depression         Health Maintenance Due   Topic Date Due    HPV vaccine (1 - 2-dose series) Never done    COVID-19 Vaccine (1) Never done    Varicella vaccine (2 of 2 - 13+ 2-dose series) 10/03/2020         Allergies   Allergen Reactions    Motrin [Ibuprofen] Other (See Comments)     Pt experienced internal bleeding due this medication.  Pt reports she \" took too much\"  Has tolerated this medication in  the past.          MEDICATIONS:      Current Outpatient Medications   Medication Sig Dispense Refill    acetaminophen (TYLENOL) 325 MG tablet Take 2 tablets by mouth every 6 hours as needed for Pain or Fever 120 tablet 3    albuterol sulfate HFA (PROAIR HFA) 108 (90 Base) MCG/ACT inhaler Inhale 2 puffs into the lungs every 4 hours as needed for Wheezing 1 Inhaler 0    ferrous sulfate (FE TABS) 325 (65 Fe) MG EC tablet Take 1 tablet by mouth 2 times daily 90 tablet 0    butalbital-acetaminophen-caffeine (FIORICET, ESGIC) -40 MG per tablet Take 1 tablet by mouth every 4 hours as needed for Headaches 18 tablet 0    Blood Pressure KIT 1 Device by Does not apply route 2 times daily (Patient not taking: Reported on 10/21/2020) 1 kit 0     Current Facility-Administered Medications   Medication Dose Route Frequency Provider Last Rate Last Admin    levonorgestrel (MIRENA) IUD 52 mg 1 each  1 each Intrauterine Once Heather Aurora, DO   1 each at 10/21/20 1418       SOCIAL HISTORY    Reviewed and no change from previous record. Tigre Zarate  reports that she has never smoked. She has never used smokeless tobacco.    FAMILY HISTORY:    Reviewed and No change from previous visit    REVIEW OF SYSTEMS:    12 point ROS Negative except as mentioned above. Review of Systems   Constitutional: Positive for activity change and fatigue. Negative for appetite change and unexpected weight change. Respiratory: Negative for cough, chest tightness and shortness of breath. Cardiovascular: Negative for chest pain, palpitations and leg swelling. Gastrointestinal: Negative for abdominal distention, abdominal pain and constipation. Endocrine: Negative for cold intolerance and polyuria. Genitourinary: Negative for difficulty urinating and dysuria. Musculoskeletal: Negative for back pain and myalgias. Neurological: Positive for headaches. Negative for tremors, syncope, weakness, light-headedness and numbness. Psychiatric/Behavioral: Positive for decreased concentration, dysphoric mood and sleep disturbance. Negative for agitation, behavioral problems, hallucinations, self-injury and suicidal ideas. The patient is nervous/anxious.         PHYSICAL EXAM:      Vitals:    04/30/21 1408 04/30/21 1413   BP: (!) 148/93 (!) 148/94   Site: Right Upper Arm    Position: Sitting    Cuff Size: Medium Adult    Pulse: 90 83   Weight: 169 lb (76.7 kg)    Height: 5' 4\" (1.626 m)      BP Readings from Last 3 Encounters:   04/30/21 (!) 148/94   02/20/21 138/86   01/07/21 (!) 142/91      General appearance - alert, well appearing, and in no distress  Mental status - alert, oriented to person, place, and time  Chest - clear to auscultation, no wheezes, rales or rhonchi, symmetric air entry  Heart - normal rate, regular rhythm, normal S1, S2  Abdomen - soft, nontender, nondistended, no masses or organomegaly  Neurological - alert, oriented, normal speech, no focal findings or movement disorder noted  Extremities - peripheral pulses normal, no pedal edema, no clubbing  Skin - normal coloration and turgor, no rashes, no suspicious skin lesions noted    LABORATORY FINDINGS:    CBC:  Lab Results   Component Value Date    WBC 7.6 10/29/2020    HGB 8.6 10/29/2020     10/29/2020       BMP:    Lab Results   Component Value Date     10/29/2020    K 3.4 10/29/2020     10/29/2020    CO2 22 10/29/2020    BUN 4 10/29/2020    CREATININE 0.48 10/29/2020    GLUCOSE 96 10/29/2020       HEMOGLOBIN A1C: No results found for: LABA1C    FASTING LIPID PANEL:No results found for: CHOL, HDL, TRIG    ASSESSMENT AND PLAN:    Morgan Santana was seen today for hypertension and anxiety. Diagnoses and all orders for this visit:    Secondary hypertension  Persistent hypotension even after Gestational period. Recent labs with hypokalemia. Will check TSH, UA, urine protein, urine potassium, serum aldosterone, serum renin and cortisol. Normocytic anemia  Recheck CBC, CMP, iron, folic acid, D80 and TSH. Current severe episode of major depressive disorder without psychotic features without prior episode ([address]  Behavioral counseling, meditation and relaxing techniques. Currently not breast-feeding. If no improvement in 2 weeks we will start therapeutic medications. FOLLOW UP AND INSTRUCTIONS:   In 2 weeks with phone visit and labs. · Morgan Santana received counseling on the following healthy behaviors: nutrition, exercise and medication adherence    · Discussed use, benefit, and side effects of prescribed medications. Barriers to medication compliance addressed.   All patient questions answered. Pt voiced understanding.      · Patient given educational materials - see patient instructions    Heather Hardy MD  Internal Medicine Resident  [facility] [place]  4/30/2021,

## 2021-05-24 ENCOUNTER — TELEMEDICINE (OUTPATIENT)
Dept: PSYCHOLOGY | Age: 26
End: 2021-05-24
Payer: COMMERCIAL

## 2021-05-24 DIAGNOSIS — F33.0 MILD EPISODE OF RECURRENT MAJOR DEPRESSIVE DISORDER (HCC): Primary | ICD-10-CM

## 2021-05-24 PROCEDURE — 1036F TOBACCO NON-USER: CPT | Performed by: SOCIAL WORKER

## 2021-05-24 PROCEDURE — 90791 PSYCH DIAGNOSTIC EVALUATION: CPT | Performed by: SOCIAL WORKER

## 2021-05-24 NOTE — PROGRESS NOTES
Within Normal Limits with No self-report of difficulties ambulating. Attitude was Engageable. Eye-contact was good. Speech: rate - WNL, rhythm -  WNL, volume - WNL  Verbalizations were goal directed. Thought processes were intact and goal-oriented without evidence of delusions, hallucinations, obsessions, or osman; with no cognitive distortions. Associations were characterized by intact cognitive processes. Pt was oriented to person, place, time, and general circumstances;  recent:  good. Insight and judgment were estimated to be good to fair, AEB, a fair  understanding of cyclical maladaptive patterns, and the ability to use insight to inform behavior change. CURRENT MEDICATIONS    Current Outpatient Medications:     acetaminophen (TYLENOL) 325 MG tablet, Take 2 tablets by mouth every 6 hours as needed for Pain or Fever, Disp: 120 tablet, Rfl: 3    albuterol sulfate HFA (PROAIR HFA) 108 (90 Base) MCG/ACT inhaler, Inhale 2 puffs into the lungs every 4 hours as needed for Wheezing, Disp: 1 Inhaler, Rfl: 0    butalbital-acetaminophen-caffeine (FIORICET, ESGIC) -40 MG per tablet, Take 1 tablet by mouth every 4 hours as needed for Headaches, Disp: 18 tablet, Rfl: 0    Blood Pressure KIT, 1 Device by Does not apply route 2 times daily (Patient not taking: Reported on 10/21/2020), Disp: 1 kit, Rfl: 0    ferrous sulfate (FE TABS) 325 (65 Fe) MG EC tablet, Take 1 tablet by mouth 2 times daily, Disp: 90 tablet, Rfl: 0     FAMILY MEDICAL/MH HISTORY   Her family history includes No Known Problems in her brother, father, maternal grandfather, mother, paternal grandfather, paternal grandmother, and sister; Other in her maternal grandmother. PATIENT MENTAL HEALTH HISTORY  Pt reported no previous counseling hx. PSYCHOSOCIAL HISTORY  Current living situation: Pt reported she resides alone, she has an 7 month old child.      Work/Education: Mohini Ashley reported she has been working from home since January with plans to return back to the office in June. Support system: Pt reported her mother and sister are supportive. Mandaen/Spirituality: Pt did not disclose. DRUG AND ALCOHOL CURRENT USE/HISTORY  TOBACCO:  She reports that she has never smoked. She has never used smokeless tobacco.  ALCOHOL:  She reports no history of alcohol use. OTHER SUBSTANCES: She reports no history of drug use. ASSESSMENT  Kyle White presented to the appointment today for evaluation and treatment of symptoms of anxiety and depression. She is currently deemed no risk to herself or others and meets criteria for Major Depressive Disorder. Dalila's depressive and anxious symptoms are well controlled at this time. She will also benefit from brief and solution-focused consultation to address cognitive and behavioral interventions for depressive and anxious symptoms. Denisse Paz was in agreement with recommendations. PHQ Scores 5/19/2021 5/6/2021 1/7/2021 3/2/2020 5/10/2018 4/8/2016 4/8/2016   PHQ2 Score 2 4 2 0 0 0 0   PHQ9 Score 2 13 2 0 0 0 0     Interpretation of Total Score Depression Severity: 1-4 = Minimal depression, 5-9 = Mild depression, 10-14 = Moderate depression, 15-19 = Moderately severe depression, 20-27 = Severe depression    How often pt has had thoughts of death or hurting self (if PHQ positive for depression):       No flowsheet data found. Interpretation of KAI-7 score: 5-9 = mild anxiety, 10-14 = moderate anxiety, 15+ = severe anxiety. Recommend referral to behavioral health for scores 10 or greater. DIAGNOSIS  Denisse Paz was seen today for depression and anxiety. Diagnoses and all orders for this visit:    Mild episode of recurrent major depressive disorder ([facility] [address]          INTERVENTION  Discussed prevalence of  depression for general population, Discussed potential treatments for  depression, Callery-setting to identify pt's [facility] / overall health and Supportive techniques.

## 2021-06-01 ENCOUNTER — HOSPITAL ENCOUNTER (OUTPATIENT)
Age: 26
Setting detail: SPECIMEN
Discharge: HOME | End: 2021-06-01
Payer: COMMERCIAL

## 2021-06-01 ENCOUNTER — TELEMEDICINE (OUTPATIENT)
Dept: PSYCHOLOGY | Age: 26
End: 2021-06-01
Payer: COMMERCIAL

## 2021-06-01 DIAGNOSIS — D64.9 NORMOCYTIC ANEMIA: ICD-10-CM

## 2021-06-01 DIAGNOSIS — I15.9 SECONDARY HYPERTENSION: ICD-10-CM

## 2021-06-01 DIAGNOSIS — F32.2 CURRENT SEVERE EPISODE OF MAJOR DEPRESSIVE DISORDER WITHOUT PSYCHOTIC FEATURES WITHOUT PRIOR EPISODE (HCC): ICD-10-CM

## 2021-06-01 DIAGNOSIS — F33.0 MILD EPISODE OF RECURRENT MAJOR DEPRESSIVE DISORDER (HCC): Primary | ICD-10-CM

## 2021-06-01 LAB
ABSOLUTE EOS #: 0.07 K/UL (ref 0–0.44)
ABSOLUTE IMMATURE GRANULOCYTE: <0.03 K/UL (ref 0–0.3)
ABSOLUTE LYMPH #: 1.65 K/UL (ref 1.1–3.7)
ABSOLUTE MONO #: 0.27 K/UL (ref 0.1–1.2)
ANION GAP SERPL CALCULATED.3IONS-SCNC: 12 MMOL/L (ref 9–17)
BASOPHILS # BLD: 1 % (ref 0–2)
BASOPHILS ABSOLUTE: 0.03 K/UL (ref 0–0.2)
BUN BLDV-MCNC: 12 MG/DL (ref 6–20)
BUN/CREAT BLD: ABNORMAL (ref 9–20)
CALCIUM SERPL-MCNC: 8.7 MG/DL (ref 8.6–10.4)
CHLORIDE BLD-SCNC: 106 MMOL/L (ref 98–107)
CO2: 21 MMOL/L (ref 20–31)
CREAT SERPL-MCNC: 0.58 MG/DL (ref 0.5–0.9)
CREATININE URINE: 281.3 MG/DL (ref 28–217)
DIFFERENTIAL TYPE: ABNORMAL
EOSINOPHILS RELATIVE PERCENT: 2 % (ref 1–4)
GFR AFRICAN AMERICAN: >60 ML/MIN
GFR NON-AFRICAN AMERICAN: >60 ML/MIN
GFR SERPL CREATININE-BSD FRML MDRD: ABNORMAL ML/MIN/{1.73_M2}
GFR SERPL CREATININE-BSD FRML MDRD: ABNORMAL ML/MIN/{1.73_M2}
GLUCOSE BLD-MCNC: 101 MG/DL (ref 70–99)
HCT VFR BLD CALC: 34.8 % (ref 36.3–47.1)
HEMOGLOBIN: 10.7 G/DL (ref 11.9–15.1)
IMMATURE GRANULOCYTES: 0 %
LYMPHOCYTES # BLD: 44 % (ref 24–43)
MCH RBC QN AUTO: 29 PG (ref 25.2–33.5)
MCHC RBC AUTO-ENTMCNC: 30.7 G/DL (ref 28.4–34.8)
MCV RBC AUTO: 94.3 FL (ref 82.6–102.9)
MICROALBUMIN/CREAT 24H UR: 22 MG/L
MICROALBUMIN/CREAT UR-RTO: 8 MCG/MG CREAT
MONOCYTES # BLD: 7 % (ref 3–12)
NRBC AUTOMATED: 0 PER 100 WBC
PDW BLD-RTO: 14 % (ref 11.8–14.4)
PLATELET # BLD: 250 K/UL (ref 138–453)
PLATELET ESTIMATE: ABNORMAL
PMV BLD AUTO: 11.7 FL (ref 8.1–13.5)
POTASSIUM SERPL-SCNC: 3.7 MMOL/L (ref 3.7–5.3)
RBC # BLD: 3.69 M/UL (ref 3.95–5.11)
RBC # BLD: ABNORMAL 10*6/UL
SEG NEUTROPHILS: 46 % (ref 36–65)
SEGMENTED NEUTROPHILS ABSOLUTE COUNT: 1.73 K/UL (ref 1.5–8.1)
SODIUM BLD-SCNC: 139 MMOL/L (ref 135–144)
VITAMIN D 25-HYDROXY: 14.1 NG/ML (ref 30–100)
WBC # BLD: 3.8 K/UL (ref 3.5–11.3)
WBC # BLD: ABNORMAL 10*3/UL

## 2021-06-01 PROCEDURE — 80048 BASIC METABOLIC PNL TOTAL CA: CPT

## 2021-06-01 PROCEDURE — 82043 UR ALBUMIN QUANTITATIVE: CPT

## 2021-06-01 PROCEDURE — 36415 COLL VENOUS BLD VENIPUNCTURE: CPT

## 2021-06-01 PROCEDURE — 82306 VITAMIN D 25 HYDROXY: CPT

## 2021-06-01 PROCEDURE — 84244 ASSAY OF RENIN: CPT

## 2021-06-01 PROCEDURE — 1036F TOBACCO NON-USER: CPT | Performed by: SOCIAL WORKER

## 2021-06-01 PROCEDURE — 82570 ASSAY OF URINE CREATININE: CPT

## 2021-06-01 PROCEDURE — 85025 COMPLETE CBC W/AUTO DIFF WBC: CPT

## 2021-06-01 PROCEDURE — 82088 ASSAY OF ALDOSTERONE: CPT

## 2021-06-01 PROCEDURE — 90834 PSYTX W PT 45 MINUTES: CPT | Performed by: SOCIAL WORKER

## 2021-06-01 NOTE — PROGRESS NOTES
ADULT BEHAVIORAL HEALTH FOLLOW UP  KATINA Berg  Licensed Independent          Visit Date: 6/1/2021   Time of appointment: 2:10 PM   Time spent with Patient: 38 minutes. This is patient's second appointment. Patient Location: [facility]/Clinician Location: [facility] & HEART; [place], [place]   This was a tele-health visit. This virtual visit was conducted via interactive/real time audio and video. Reason for Consult:  Depression and Anxiety     PCP:  Fausto Rivas MD      Pt provided informed consent for the behavioral health program. Discussed with patient model of service to include the limits of confidentiality (i.e. abuse reporting, suicide intervention, etc.) and short-term intervention focused approach. Pt indicated understanding. Pt provided verbal consent to engage in tele-health psychotherapy. This visit was completed virtually using My Chart due to contact precautions related to the COVID-19 pandemic. This session was held in a private space in patient's home. Herminio Lizarraga is a 31 y.o. female who presents for follow up of depression. Johann Guevara presented to session with continuing symptoms of anxiety and depression. She reported that the last week has been difficult at times and that she had become upset at recent family gathering where she became vocal. She elaborated on the history of her relationship with her child's father and being a single mom. Johann Guevara admitted that she often holds in her emotions. Johann Guevara admitted that she often does not have time to herself as she spends most time working and caring for her daughter. We discussed the importance of self-care and Johann Guevara being able to have time for herself as well. Previous Recommendations: Johann Guevara was recommended to begin psychotherapy with [facility] to address depressive and anxious symptoms. F/u appointment scheduled for 6/1. MENTAL STATUS EXAM  Mood was dysthymic with calm affect. Suicidal ideation was denied. Homicidal ideation was denied. Hygiene was good . Dress was appropriate. Behavior was Within Normal Limits with No self-report of difficulties ambulating. Attitude was Engageable. Eye-contact was good. Speech: rate - WNL, rhythm - WNL, volume - WNL. Verbalizations were goal directed. Thought processes were intact and goal-oriented without evidence of delusions, hallucinations, obsessions, or osman; with no cognitive distortions. Associations were characterized by intact cognitive processes. Pt was oriented to person, place, time, and general circumstances;  recent:  good. Insight and judgment were estimated to be good to fair, AEB, a fair  understanding of cyclical maladaptive patterns, and the ability to use insight to inform behavior change. ASSESSMENT  Martin White presented to the appointment today for evaluation and treatment of symptoms of depression. She is currently deemed no risk to herself or others and meets criteria for Major Depressive Disorder. Continue psychotherapy at this time with [facility]. Ginny Moreno was in agreement with recommendations. PHQ Scores 5/19/2021 5/6/2021 1/7/2021 3/2/2020 5/10/2018 4/8/2016 4/8/2016   PHQ2 Score 2 4 2 0 0 0 0   PHQ9 Score 2 13 2 0 0 0 0     Interpretation of Total Score Depression Severity: 1-4 = Minimal depression, 5-9 = Mild depression, 10-14 = Moderate depression, 15-19 = Moderately severe depression, 20-27 = Severe depression    How often pt has had thoughts of death or hurting self (if PHQ positive for depression):       No flowsheet data found. Interpretation of KAI-7 score: 5-9 = mild anxiety, 10-14 = moderate anxiety, 15+ = severe anxiety. Recommend referral to behavioral health for scores 10 or greater. DIAGNOSIS  Ginny Moreno was seen today for depression and anxiety.     Diagnoses and all orders for this visit:    Mild episode of recurrent major depressive disorder [facility]        INTERVENTION  Established rapport, Murphys-setting to identify pt's primary goals for [facility] visit / overall health, Supportive techniques and Emphasized self-care as important for managing overall health. PLAN  F/u appointment is scheduled for 6/16 to check-in with patient on progress. Continue psychotherapy to address depressive and anxious symptoms. INTERACTIVE COMPLEXITY  Is interactive complexity present?   No  Reason:  N/A  Additional Supporting Information:  N/A       Electronically signed by Jestine Pallas on 6/8/2021 at 10:47 PM

## 2021-06-02 DIAGNOSIS — E55.9 VITAMIN D DEFICIENCY: Primary | ICD-10-CM

## 2021-06-02 DIAGNOSIS — E53.1 PYRIDOXINE DEFICIENCY: ICD-10-CM

## 2021-06-02 RX ORDER — PYRIDOXINE HCL (VITAMIN B6) 50 MG
50 TABLET ORAL DAILY
Qty: 90 TABLET | Refills: 3 | Status: SHIPPED | OUTPATIENT
Start: 2021-06-02 | End: 2021-06-03

## 2021-06-02 RX ORDER — ERGOCALCIFEROL 1.25 MG/1
50000 CAPSULE ORAL WEEKLY
Qty: 12 CAPSULE | Refills: 0 | Status: SHIPPED | OUTPATIENT
Start: 2021-06-02 | End: 2021-06-03

## 2021-06-02 NOTE — RESULT ENCOUNTER NOTE
Abnormal blood test indicating vitamin d and b6 deficiency. Ordered the vitamins .  Patient is advised to increase the protein intake in diet

## 2021-06-03 ENCOUNTER — OFFICE VISIT (OUTPATIENT)
Dept: INTERNAL MEDICINE | Age: 26
End: 2021-06-03
Payer: COMMERCIAL

## 2021-06-03 VITALS
WEIGHT: 165 LBS | DIASTOLIC BLOOD PRESSURE: 89 MMHG | BODY MASS INDEX: 28.17 KG/M2 | HEART RATE: 77 BPM | SYSTOLIC BLOOD PRESSURE: 139 MMHG | HEIGHT: 64 IN

## 2021-06-03 DIAGNOSIS — E55.9 VITAMIN D DEFICIENCY: ICD-10-CM

## 2021-06-03 DIAGNOSIS — D50.9 IRON DEFICIENCY ANEMIA, UNSPECIFIED IRON DEFICIENCY ANEMIA TYPE: ICD-10-CM

## 2021-06-03 DIAGNOSIS — F41.9 ANXIETY: Primary | ICD-10-CM

## 2021-06-03 DIAGNOSIS — E53.1 PYRIDOXINE DEFICIENCY: ICD-10-CM

## 2021-06-03 DIAGNOSIS — R79.89 ABNORMAL LFTS: ICD-10-CM

## 2021-06-03 LAB
ALDOSTERONE COMMENT: NORMAL
ALDOSTERONE: <3 NG/DL

## 2021-06-03 PROCEDURE — 99211 OFF/OP EST MAY X REQ PHY/QHP: CPT | Performed by: INTERNAL MEDICINE

## 2021-06-03 PROCEDURE — G8427 DOCREV CUR MEDS BY ELIG CLIN: HCPCS | Performed by: STUDENT IN AN ORGANIZED HEALTH CARE EDUCATION/TRAINING PROGRAM

## 2021-06-03 PROCEDURE — 1036F TOBACCO NON-USER: CPT | Performed by: STUDENT IN AN ORGANIZED HEALTH CARE EDUCATION/TRAINING PROGRAM

## 2021-06-03 PROCEDURE — G8417 CALC BMI ABV UP PARAM F/U: HCPCS | Performed by: STUDENT IN AN ORGANIZED HEALTH CARE EDUCATION/TRAINING PROGRAM

## 2021-06-03 PROCEDURE — 99213 OFFICE O/P EST LOW 20 MIN: CPT | Performed by: STUDENT IN AN ORGANIZED HEALTH CARE EDUCATION/TRAINING PROGRAM

## 2021-06-03 RX ORDER — PYRIDOXINE HCL (VITAMIN B6) 50 MG
50 TABLET ORAL DAILY
Qty: 90 TABLET | Refills: 3 | Status: SHIPPED | OUTPATIENT
Start: 2021-06-03 | End: 2021-09-17 | Stop reason: ALTCHOICE

## 2021-06-03 RX ORDER — FERROUS SULFATE 300 MG/5ML
300 LIQUID (ML) ORAL DAILY
COMMUNITY
End: 2022-02-18

## 2021-06-03 RX ORDER — ERGOCALCIFEROL 1.25 MG/1
50000 CAPSULE ORAL WEEKLY
Qty: 12 CAPSULE | Refills: 0 | Status: SHIPPED | OUTPATIENT
Start: 2021-06-03 | End: 2021-09-17 | Stop reason: SDUPTHER

## 2021-06-03 SDOH — ECONOMIC STABILITY: FOOD INSECURITY: WITHIN THE PAST 12 MONTHS, YOU WORRIED THAT YOUR FOOD WOULD RUN OUT BEFORE YOU GOT MONEY TO BUY MORE.: NEVER TRUE

## 2021-06-03 SDOH — ECONOMIC STABILITY: FOOD INSECURITY: WITHIN THE PAST 12 MONTHS, THE FOOD YOU BOUGHT JUST DIDN'T LAST AND YOU DIDN'T HAVE MONEY TO GET MORE.: NEVER TRUE

## 2021-06-03 ASSESSMENT — ENCOUNTER SYMPTOMS
NAUSEA: 0
BACK PAIN: 0
COUGH: 0
SHORTNESS OF BREATH: 0
PHOTOPHOBIA: 0
DIARRHEA: 0
VOMITING: 0
SORE THROAT: 0
RHINORRHEA: 0
SINUS PRESSURE: 0
CHEST TIGHTNESS: 0
ABDOMINAL PAIN: 0

## 2021-06-03 ASSESSMENT — SOCIAL DETERMINANTS OF HEALTH (SDOH): HOW HARD IS IT FOR YOU TO PAY FOR THE VERY BASICS LIKE FOOD, HOUSING, MEDICAL CARE, AND HEATING?: SOMEWHAT HARD

## 2021-06-03 NOTE — PROGRESS NOTES
Attending Physician Statement  I have discussed the care of [address] including pertinent history and exam findings,  with the resident. I have reviewed the key elements of all parts of the encounter with the resident. I agree with the assessment, plan and orders as documented by the resident.   (GE Modifier)    MD GUICHO Rodriguez  Attending Physician, [address] [place] [address] Internal Medicine Residency Program  [address] [place] [address]  6/3/2021, 5:06 PM

## 2021-06-03 NOTE — PATIENT INSTRUCTIONS
Labs given to patient, they will have them done before their next visit. Medications e-scribed to pharmacy of patient's choice.     Office will call pt in august to schedule next appointment    tv

## 2021-06-03 NOTE — PROGRESS NOTES
[facility]  [address] [address]  Dept: [phone]  Dept Fax: [phone]    Office Progress/Follow Up Note  Date of patient's visit: 6/3/2021  Patient's Name:  Caren Lefort YOB: 1995            Patient Care Team:  Isaías Cavanaugh MD as PCP - General (Emergency Medicine)  Salas Gardner MD as Obstetrician (Perinatology)  ________________________________________________________________________      Reason for Visit: Routine outpatient follow up  ________________________________________________________________________  Chief Complaint:  1 Month Follow-Up and Anxiety    ________________________________________________________________________  History of Presenting Illness:  History was obtained from: patient. Caren Lefort is a 31 y.o. is here for a follow up visit for her anxiety. She has been following up with Psychology since her last visit and feels a lot better. She states she would like to follow up with them and would not like to take any medications at this time. She has no other complaints at this time. She has PMH of iron deficiency and has been taking iron pills on and off. Patient Active Problem List   Diagnosis    Family history of sickle cell trait (Pt negative)    Normocytic anemia due to blood loss    S/P primary low transverse     Gestational hypertension (G1)    Encounter for insertion of mirena IUD    Normocytic anemia    Secondary hypertension    Depression       Allergies   Allergen Reactions    Motrin [Ibuprofen] Other (See Comments)     Pt experienced internal bleeding due this medication.  Pt reports she \" took too much\"  Has tolerated this medication in  the past.          Current Outpatient Medications   Medication Sig Dispense Refill    ferrous sulfate 300 (60 Fe) MG/5ML syrup Take 300 mg by mouth daily      vitamin D (ERGOCALCIFEROL) 1.25 MG (82054 UT) CAPS capsule Take 1 capsule by mouth once a week 12 capsule 0    acetaminophen (TYLENOL) 325 MG tablet Take 2 tablets by mouth every 6 hours as needed for Pain or Fever 120 tablet 3     Current Facility-Administered Medications   Medication Dose Route Frequency Provider Last Rate Last Admin    levonorgestrel (MIRENA) IUD 52 mg 1 each  1 each [facility]uterine [facility]   1 each at 10/21/20 1418       Social History     Tobacco Use    Smoking status: Never Smoker    Smokeless tobacco: Never Used   Vaping Use    Vaping Use: Never used   Substance Use Topics    Alcohol use: No    Drug use: No       Family History   Problem Relation Age of Onset    No Known Problems Mother     No Known Problems Father     Other Maternal Grandmother         diverticulitis    No Known Problems Paternal Grandfather     No Known Problems Paternal Grandmother     No Known Problems Maternal Grandfather     No Known Problems Brother     No Known Problems Sister     Breast Cancer Neg Hx     Cancer Neg Hx     Colon Cancer Neg Hx     Diabetes Neg Hx     Eclampsia Neg Hx     Hypertension Neg Hx     Ovarian Cancer Neg Hx      Labor Neg Hx     Spont Abortions Neg Hx     Stroke Neg Hx       ________________________________________________________________________  Review of Systems   Constitutional: Negative for chills, diaphoresis and fever. HENT: Negative for rhinorrhea, sinus pressure and sore throat. Eyes: Negative for photophobia. Respiratory: Negative for cough, chest tightness and shortness of breath. Cardiovascular: Negative for chest pain. Gastrointestinal: Negative for abdominal pain, diarrhea, nausea and vomiting. Genitourinary: Negative for dysuria, frequency and urgency. Musculoskeletal: Negative for back pain and neck stiffness. Skin: Negative for rash. Neurological: Negative for seizures and speech difficulty.    Psychiatric/Behavioral: Negative for agitation, confusion and decreased

## 2021-06-07 LAB
RENIN ACTIVITY: 0.2 NG/ML/HR
RENIN COMMENT: NORMAL

## 2021-06-16 ENCOUNTER — TELEMEDICINE (OUTPATIENT)
Dept: PSYCHOLOGY | Age: 26
End: 2021-06-16
Payer: COMMERCIAL

## 2021-06-16 DIAGNOSIS — F33.0 MILD EPISODE OF RECURRENT MAJOR DEPRESSIVE DISORDER (HCC): Primary | ICD-10-CM

## 2021-06-16 PROCEDURE — 1036F TOBACCO NON-USER: CPT | Performed by: SOCIAL WORKER

## 2021-06-16 PROCEDURE — 90834 PSYTX W PT 45 MINUTES: CPT | Performed by: SOCIAL WORKER

## 2021-06-16 NOTE — PROGRESS NOTES
ADULT BEHAVIORAL HEALTH FOLLOW UP  KATINA Cade  Licensed Independent          Visit Date: 6/16/2021   Time of appointment: 1:04 PM    Time spent with Patient: 38 minutes. This is patient's third appointment. Patient Location: [facility], [facility]/Clinician Location: [facility] & HEART; [facility]   This was a tele-health visit. This virtual visit was conducted via interactive/real time audio and video. Reason for Consult:  Depression     PCP:  Demi Braxton MD      Pt provided informed consent for the behavioral health program. Discussed with patient model of service to include the limits of confidentiality (i.e. abuse reporting, suicide intervention, etc.) and short-term intervention focused approach. Pt indicated understanding. Pt provided verbal consent to engage in tele-health psychotherapy. This visit was completed virtually using My Chart due to contact precautions related to the COVID-19 pandemic. This session was held in a private space in patient's home. Mike Cox is a 31 y.o. female who presents for follow up of depression. Berto Loya shared she has recently been disconnecting from others. She explained that she often can take several weeks and sometimes months where she just doesn't talk to people around her. She elaborated on her hesitance with becoming vulnerable or close to others in fear they might try to use information against her. She admitted that she would like to work on this, knowing that her mindset of always doing everything alone can be challenging at times. Previous Recommendations: F/u appointment is scheduled for 6/16 to check-in with patient on progress. Continue psychotherapy to address depressive and anxious symptoms. MENTAL STATUS EXAM  Mood was within normal limits with calm affect. Suicidal ideation was denied. Homicidal ideation was denied. Hygiene was good . Dress was appropriate.    Behavior was Within Normal Limits with No self-report of difficulties ambulating. Attitude was Engageable. Eye-contact was good. Speech: rate - WNL, rhythm - WNL, volume - WNL. Verbalizations were goal directed. Thought processes were intact and goal-oriented without evidence of delusions, hallucinations, obsessions, or osman; with no cognitive distortions. Associations were characterized by intact cognitive processes. Pt was oriented to person, place, time, and general circumstances;  recent:  good. Insight and judgment were estimated to be good to fair, AEB, a fair  understanding of cyclical maladaptive patterns, and the ability to use insight to inform behavior change. ASSESSMENT  Daisy White presented to the appointment today for evaluation and treatment of symptoms of depression. She is currently deemed no risk to herself or others and meets criteria for Major Depressive Disorder. We discussed solution-focused interventions to assist Darryl Mcmahon with slowly becoming more vulnerable with friends she would like to further build a relationship with, we also discussed some healthy communication strategies and ways to set limits and boundaries to avoid social isolation for extended periods of time. Darryl Mcmahon would like to continue psychotherapy at this time and was in agreement with recommendations. PHQ Scores 6/9/2021 5/19/2021 5/6/2021 1/7/2021 3/2/2020 5/10/2018 4/8/2016   PHQ2 Score 2 2 4 2 0 0 0   PHQ9 Score 2 2 13 2 0 0 0     Interpretation of Total Score Depression Severity: 1-4 = Minimal depression, 5-9 = Mild depression, 10-14 = Moderate depression, 15-19 = Moderately severe depression, 20-27 = Severe depression    How often pt has had thoughts of death or hurting self (if PHQ positive for depression):       No flowsheet data found. Interpretation of KAI-7 score: 5-9 = mild anxiety, 10-14 = moderate anxiety, 15+ = severe anxiety.  Recommend referral to behavioral health for scores 10 or greater. DIAGNOSIS  Denisse Paz was seen today for depression. Diagnoses and all orders for this visit:    Mild episode of recurrent major depressive disorder ([facility] [address]        INTERVENTION  Trained in improving communication skills, Provided education, Supportive techniques, Emphasized self-care as important for managing overall health, CBT to target depression and Identified maladaptive thoughts. PLAN  F/u appointment scheduled for 6/30 to continue addressing depressive symptoms. INTERACTIVE COMPLEXITY  Is interactive complexity present?   No  Reason:  N/A  Additional Supporting Information:  N/A       Electronically signed by KATINA Butcher on 6/28/2021 at 9:14 AM

## 2021-07-14 ENCOUNTER — TELEMEDICINE (OUTPATIENT)
Dept: PSYCHOLOGY | Age: 26
End: 2021-07-14
Payer: COMMERCIAL

## 2021-07-14 DIAGNOSIS — F33.0 MILD EPISODE OF RECURRENT MAJOR DEPRESSIVE DISORDER (HCC): Primary | ICD-10-CM

## 2021-07-14 PROCEDURE — 90834 PSYTX W PT 45 MINUTES: CPT | Performed by: SOCIAL WORKER

## 2021-07-14 PROCEDURE — 1036F TOBACCO NON-USER: CPT | Performed by: SOCIAL WORKER

## 2021-07-14 NOTE — PROGRESS NOTES
ADULT BEHAVIORAL HEALTH FOLLOW UP  KATINA Sinclair  Licensed Independent          Visit Date: 7/14/2021   Time of appointment: 1:14 PM    Time spent with Patient: 40 minutes. This is patient's fourth appointment. Patient Location: [facility]/Clinician Location: [facility] HE[facility]; [place], [place]   This was a tele-health visit. This virtual visit was conducted via interactive/real time audio and video. Reason for Consult:  Depression and Anxiety     PCP:  Kendall Wild MD      Pt provided informed consent for the behavioral health program. Discussed with patient model of service to include the limits of confidentiality (i.e. abuse reporting, suicide intervention, etc.) and short-term intervention focused approach. Pt indicated understanding. Pt provided verbal consent to engage in tele-health psychotherapy. This visit was completed virtually using My Chart due to contact precautions related to the COVID-19 pandemic. This session was held in a private space in patient's home. Susana Horne is a 31 y.o. female who presents for follow up of depression. Judi Gimenez shared she has been feeling extremely overwhelmed trying to maintain work obligations and being a single mom. She shared she has been trying to utilize her supports and has been frustrated when her family either complains after helping and/or her mother repeats to her that it's just apart of being a mother. Judi Gimenez admits that when she hears this it is triggering and encourages her to self-isolate, sharing lately she has not wanted to talk to anyone or see anyone. Judi Gimenez reported she has been feeling down, hasn't been cooking or sleeping, and has had significant focus/concentration difficulty. Judi Gimenez shared they did go to [place] over the weekend to enjoy a water park but the trip became disappointing when the weather was not suited for this.  She is planning to take Monday and Tuesday off this week and is looking forward to this time to relax and decompress. Juanita Spears shared she also intends to go to [facility] in August to visit her father's family, she explained her sister and her friend who encouraged her to go out to eat and also offered to schedule her a hair appointment have been helpful. Previous Recommendations: F/u appointment scheduled for 6/30 to continue addressing depressive symptoms. MENTAL STATUS EXAM  Mood was depressed with anxious affect. Suicidal ideation was denied. Homicidal ideation was denied. Hygiene was good . Dress was appropriate. Behavior was Within Normal Limits with No self-report of difficulties ambulating. Attitude was Engageable. Eye-contact was good. Speech: rate - WNL, rhythm - WNL, volume - WNL. Verbalizations were goal directed. Thought processes were intact and goal-oriented without evidence of delusions, hallucinations, obsessions, or osman; with no cognitive distortions. Associations were characterized by intact cognitive processes. Pt was oriented to person, place, time, and general circumstances;  recent:  good. Insight and judgment were estimated to be good to fair, AEB, a fair  understanding of cyclical maladaptive patterns, and the ability to use insight to inform behavior change. ASSESSMENT  Demetrio White presented to the appointment today for evaluation and treatment of symptoms of depression. She is currently deemed no risk to herself or others and meets criteria for Major Depressive Disorder. We discussed self-care activities and ways to alternatively utilize social support even if they are not local - calling, video calls, text-ing. We also discussed using positive self-talk to reinforce and validate Juanita Spears having time to herself to fulfill self-care needs. Juanita Spears was recommended to continue psychotherapy, she was scheduled for follow-up in 2 weeks. Juanita Spears was in agreement with recommendations.        PHQ Scores 6/9/2021 5/19/2021 5/6/2021 1/7/2021 3/2/2020 5/10/2018 4/8/2016   PHQ2 Score 2 2 4 2 0 0 0   PHQ9 Score 2 2 13 2 0 0 0     Interpretation of Total Score Depression Severity: 1-4 = Minimal depression, 5-9 = Mild depression, 10-14 = Moderate depression, 15-19 = Moderately severe depression, 20-27 = Severe depression    How often pt has had thoughts of death or hurting self (if PHQ positive for depression):       No flowsheet data found. Interpretation of KAI-7 score: 5-9 = mild anxiety, 10-14 = moderate anxiety, 15+ = severe anxiety. Recommend referral to behavioral health for scores 10 or greater. DIAGNOSIS  Liu Worthy was seen today for depression and anxiety. Diagnoses and all orders for this visit:    Mild episode of recurrent major depressive disorder ([facility] [address]        INTERVENTION  Discussed self-care (sleep, nutrition, rewarding activities, social support, exercise), Supportive techniques, Emphasized self-care as important for managing overall health and CBT to target anxiety & feeling overwhelmed to use positive re-frame to challenge negative automatic thoughts. Noreen Acevedo was scheduled for 2 week follow-up appointment to check-in on progress managing depressive symptoms. Encouraged patient to utilize social supports and to journal or use alternative forms of healthy self-expression. INTERACTIVE COMPLEXITY  Is interactive complexity present?   No  Reason:  N/A  Additional Supporting Information:  N/A       Electronically signed by Windy Councilman on 7/14/2021 at 11:14 PM

## 2021-07-28 ENCOUNTER — TELEPHONE (OUTPATIENT)
Dept: PSYCHOLOGY | Age: 26
End: 2021-07-28

## 2021-07-28 NOTE — TELEPHONE ENCOUNTER
Contacted patient today in regards to missed appointment. Left voicemail. Was call completed?  If not, reason: Call was completed

## 2021-09-17 ENCOUNTER — OFFICE VISIT (OUTPATIENT)
Dept: INTERNAL MEDICINE | Age: 26
End: 2021-09-17
Payer: COMMERCIAL

## 2021-09-17 VITALS
HEART RATE: 101 BPM | BODY MASS INDEX: 29.94 KG/M2 | SYSTOLIC BLOOD PRESSURE: 134 MMHG | DIASTOLIC BLOOD PRESSURE: 83 MMHG | TEMPERATURE: 98.8 F | HEIGHT: 64 IN | WEIGHT: 175.4 LBS

## 2021-09-17 DIAGNOSIS — F33.1 MODERATE EPISODE OF RECURRENT MAJOR DEPRESSIVE DISORDER (HCC): Primary | ICD-10-CM

## 2021-09-17 DIAGNOSIS — E55.9 VITAMIN D DEFICIENCY: ICD-10-CM

## 2021-09-17 DIAGNOSIS — Z23 FLU VACCINE NEED: ICD-10-CM

## 2021-09-17 DIAGNOSIS — R53.83 FATIGUE, UNSPECIFIED TYPE: ICD-10-CM

## 2021-09-17 DIAGNOSIS — R63.5 EXCESSIVE WEIGHT GAIN: ICD-10-CM

## 2021-09-17 PROCEDURE — 99213 OFFICE O/P EST LOW 20 MIN: CPT | Performed by: STUDENT IN AN ORGANIZED HEALTH CARE EDUCATION/TRAINING PROGRAM

## 2021-09-17 PROCEDURE — 1036F TOBACCO NON-USER: CPT | Performed by: STUDENT IN AN ORGANIZED HEALTH CARE EDUCATION/TRAINING PROGRAM

## 2021-09-17 PROCEDURE — G8427 DOCREV CUR MEDS BY ELIG CLIN: HCPCS | Performed by: STUDENT IN AN ORGANIZED HEALTH CARE EDUCATION/TRAINING PROGRAM

## 2021-09-17 PROCEDURE — G8417 CALC BMI ABV UP PARAM F/U: HCPCS | Performed by: STUDENT IN AN ORGANIZED HEALTH CARE EDUCATION/TRAINING PROGRAM

## 2021-09-17 PROCEDURE — G0008 ADMIN INFLUENZA VIRUS VAC: HCPCS | Performed by: STUDENT IN AN ORGANIZED HEALTH CARE EDUCATION/TRAINING PROGRAM

## 2021-09-17 PROCEDURE — 99211 OFF/OP EST MAY X REQ PHY/QHP: CPT | Performed by: INTERNAL MEDICINE

## 2021-09-17 RX ORDER — ERGOCALCIFEROL 1.25 MG/1
50000 CAPSULE ORAL WEEKLY
Qty: 12 CAPSULE | Refills: 0 | Status: SHIPPED | OUTPATIENT
Start: 2021-09-17 | End: 2022-02-18 | Stop reason: ALTCHOICE

## 2021-09-17 RX ORDER — PAROXETINE HYDROCHLORIDE 20 MG/1
20 TABLET, FILM COATED ORAL DAILY
Qty: 30 TABLET | Refills: 3 | Status: SHIPPED | OUTPATIENT
Start: 2021-09-17 | End: 2022-02-18

## 2021-09-17 ASSESSMENT — PATIENT HEALTH QUESTIONNAIRE - PHQ9
SUM OF ALL RESPONSES TO PHQ QUESTIONS 1-9: 14
SUM OF ALL RESPONSES TO PHQ9 QUESTIONS 1 & 2: 3
3. TROUBLE FALLING OR STAYING ASLEEP: 3
SUM OF ALL RESPONSES TO PHQ QUESTIONS 1-9: 14
10. IF YOU CHECKED OFF ANY PROBLEMS, HOW DIFFICULT HAVE THESE PROBLEMS MADE IT FOR YOU TO DO YOUR WORK, TAKE CARE OF THINGS AT HOME, OR GET ALONG WITH OTHER PEOPLE: 1
8. MOVING OR SPEAKING SO SLOWLY THAT OTHER PEOPLE COULD HAVE NOTICED. OR THE OPPOSITE, BEING SO FIGETY OR RESTLESS THAT YOU HAVE BEEN MOVING AROUND A LOT MORE THAN USUAL: 2
9. THOUGHTS THAT YOU WOULD BE BETTER OFF DEAD, OR OF HURTING YOURSELF: 0
7. TROUBLE CONCENTRATING ON THINGS, SUCH AS READING THE NEWSPAPER OR WATCHING TELEVISION: 2
2. FEELING DOWN, DEPRESSED OR HOPELESS: 1
SUM OF ALL RESPONSES TO PHQ QUESTIONS 1-9: 14
6. FEELING BAD ABOUT YOURSELF - OR THAT YOU ARE A FAILURE OR HAVE LET YOURSELF OR YOUR FAMILY DOWN: 0
5. POOR APPETITE OR OVEREATING: 2
4. FEELING TIRED OR HAVING LITTLE ENERGY: 2
1. LITTLE INTEREST OR PLEASURE IN DOING THINGS: 2

## 2021-09-17 ASSESSMENT — ENCOUNTER SYMPTOMS
ABDOMINAL PAIN: 0
SHORTNESS OF BREATH: 0
CHEST TIGHTNESS: 0
DIARRHEA: 0
CONSTIPATION: 0
BACK PAIN: 0
COUGH: 0

## 2021-09-17 NOTE — PROGRESS NOTES
Attending Physician Statement  I have discussed the care of [employer] including pertinent history and exam findings,  with the resident. I have reviewed the key elements of all parts of the encounter with the resident. I agree with the assessment, plan and orders as documented by the resident.   (GE Modifier)    MD GUICHO Gonzalez  Attending Physician, [facility]   Faculty, Internal Medicine Residency Program  [address] [place] [address]  9/17/2021, 4:21 PM

## 2021-09-17 NOTE — PROGRESS NOTES
[facility]    Progress Note    Date of patient's visit: 2021    Patient's Name:  Ghazal Sharpe    YOB: 1995            Patient Care Team:  Paul Kennedy MD as PCP - General (Emergency Medicine)  Zahra Brown MD as Obstetrician (Perinatology)    REASON FOR VISIT: Routine outpatient follow/Same day visit    Chief Complaint   Patient presents with    Migraine     last migraine lasted 3 days     Hypertension    Health Maintenance     Flu vaccine pended,     Discuss Medications     wants to have IUD removed and discuss alternative medications for birth control          HISTORY OF PRESENT ILLNESS:    History was obtained from the patient. Ghazal Sharpe is a 31 y.o. is here for regular follow-up appointment. Patient complaining of fatigue, weight gain unintentional, mood changes for the past few months. Depression screening positive 14. Recent labs have shown low vitamin D 14. Secondary causes of hypertension work-up showed normal renin, aldosterone, urine protein creatinine ratio. Patient had gestational hypertension however persistently high later on. Family history of hypertension and hyperlipidemia.       Past Medical History:   Diagnosis Date    Depression 2021    Gestational hypertension (G1) 2020    History of blood transfusion 2018    From Motrin use     Hx of blood transfusion (2018) 2020    Due to GI bleed secondary to Motrin use    Menometrorrhagia 05/10/2018    Symptomatic anemia 2018    From Motrin use     Vision abnormalities        Past Surgical History:   Procedure Laterality Date     SECTION N/A 2020     SECTION performed by Roxane Carson DO at [facility]    COLONOSCOPY  2018    No lesions seen    AR COLON CA SCRN NOT HI RSK IND N/A 2018    COLONOSCOPY performed by Isaías Chen MD at [address] [place] [address]  2018 No lesions seen up to the 2nd part of the duodenum.  UPPER GASTROINTESTINAL ENDOSCOPY N/A 04/18/2018    EGD DIAGNOSTIC ONLY performed by Artem Yanez MD at [address]      Allergies   Allergen Reactions    Motrin [Ibuprofen] Other (See Comments)     Pt experienced internal bleeding due this medication. Pt reports she \" took too much\"  Has tolerated this medication in  the past.        MEDICATIONS:      Current Outpatient Medications on File Prior to Visit   Medication Sig Dispense Refill    acetaminophen (TYLENOL) 325 MG tablet Take 2 tablets by mouth every 6 hours as needed for Pain or Fever 120 tablet 3    ferrous sulfate 300 (60 Fe) MG/5ML syrup Take 300 mg by mouth daily (Patient not taking: Reported on 9/17/2021)      vitamin D (ERGOCALCIFEROL) 1.25 MG (22489 UT) CAPS capsule Take 1 capsule by mouth once a week (Patient not taking: Reported on 9/17/2021) 12 capsule 0    pyridoxine (RA VITAMIN B-6) 50 MG tablet Take 1 tablet by mouth daily (Patient not taking: Reported on 9/17/2021) 90 tablet 3     Current Facility-Administered Medications on File Prior to Visit   Medication Dose Route Frequency Provider Last Rate Last Admin    levonorgestrel (MIRENA) IUD 52 mg 1 each  1 each IntraUTERine Once Ranjana Sand, DO   1 each at 10/21/20 1418       HISTORY    Reviewed and no change from previous record. Olga Worthy  reports that she has never smoked. She has never used smokeless tobacco.    FAMILY HISTORY:    Reviewed and No change from previous visit    HEALTH MAINTENANCE DUE:      There are no preventive care reminders to display for this patient. REVIEW OF SYSTEMS:    12 point review of symptoms completed and found to be normal except noted in the HPI    Review of Systems   Constitutional: Positive for appetite change, fatigue and unexpected weight change. Negative for activity change. Respiratory: Negative for cough, chest tightness and shortness of breath.     Cardiovascular: Negative for chest pain, palpitations and leg swelling. Gastrointestinal: Negative for abdominal pain, constipation and diarrhea. Endocrine: Positive for cold intolerance and heat intolerance. Negative for polyuria. Genitourinary: Negative for difficulty urinating, vaginal discharge and vaginal pain. Musculoskeletal: Negative for arthralgias and back pain. Neurological: Negative for dizziness, seizures and speech difficulty. Psychiatric/Behavioral: Positive for decreased concentration, dysphoric mood and sleep disturbance. Negative for agitation, self-injury and suicidal ideas. The patient is nervous/anxious and is hyperactive. PHYSICAL EXAM:     Vitals:    09/17/21 1513   BP: 134/83   Pulse: 101   Temp: 98.8 °F (37.1 °C)   TempSrc: Temporal   Weight: 175 lb 6.4 oz (79.6 kg)   Height: 5' 4\" (1.626 m)     Body mass index is 30.11 kg/m². BP Readings from Last 3 Encounters:   09/17/21 134/83   06/03/21 139/89   04/30/21 (!) 148/94        Wt Readings from Last 3 Encounters:   09/17/21 175 lb 6.4 oz (79.6 kg)   06/03/21 165 lb (74.8 kg)   04/30/21 169 lb (76.7 kg)         HENT: , Atraumatic, Bilateral external ears normal, Oropharynx moist, No oral exudates, Nose normal. Neck- Normal range of motion, No tenderness, Supple, No stridor. Eyes:  PERRL, EOMI, Conjunctiva normal, No discharge. Respiratory:  Normalbreath sounds, No respiratory distress, No wheezing, No chest tenderness. Cardiovascular:  Normal heart rate, Normal rhythm, No murmurs, No rubs, No gallops. GI:  Bowel sounds normal, Soft, No tenderness, No masses, No pulsatile masses. :  External genitalia appear normal, No masses orlesions. No discharge. No CVA tenderness. Musculoskeletal:  Intact distal pulses, No edema, No tenderness, No cyanosis, No clubbing. Good range of motion in all major joints. No tenderness to palpation or major deformities noted. Back- Notenderness. Integument:  Warm, Dry, No erythema, No rash. Lymphatic:  No lymphadenopathy noted. Neurologic:  Alert & oriented x 3, Normal motor function, Normal sensory function, No focal deficits noted. Psychiatric:  Affect normal, Judgmentnormal, Mood normal.     LABORATORY FINDINGS:    CBC:  Lab Results   Component Value Date    WBC 3.8 06/01/2021    HGB 10.7 06/01/2021     06/01/2021     BMP:    Lab Results   Component Value Date     06/01/2021    K 3.7 06/01/2021     06/01/2021    CO2 21 06/01/2021    BUN 12 06/01/2021    CREATININE 0.58 06/01/2021    GLUCOSE 101 06/01/2021     HEMOGLOBIN A1C: No results found for: LABA1C  MICROALBUMIN URINE:   Lab Results   Component Value Date    MICROALBUR 22 06/01/2021     FASTING LIPID PANEL:No results found for: CHOL, HDL, TRIG  No results found for: LDLCALC, LDLCHOLESTEROL, LDLDIRECT    LIVER PROFILE:  Lab Results   Component Value Date    ALT <5 10/29/2020    AST 7 10/29/2020    PROT 5.9 10/29/2020    BILITOT 0.16 10/29/2020    BILIDIR <0.08 08/13/2018    LABALBU 3.1 10/29/2020      THYROID FUNCTION: No results found for: TSH   URINEANALYSIS: No results found for: LABURIN  ASSESSMENT AND PLAN:    1. Moderate episode of recurrent major depressive disorder ([facility] [address]  Depression screening +14. Per patient psychology counseling not helping. Fatigue, unintentional weight gain, insomnia, mood changes and hypertension. We will check thyroid studies and get labs done for iron studies. Discussed about side effects and benefits of paroxetine. Patient verbalized understanding that it will take 6 weeks for paroxetine to be effective. - PARoxetine (PAXIL) 20 MG tablet; Take 1 tablet by mouth daily  Dispense: 30 tablet; Refill: 3    2. Vitamin D deficiency  - vitamin D (ERGOCALCIFEROL) 1.25 MG (41474 UT) CAPS capsule; Take 1 capsule by mouth once a week  Dispense: 12 capsule; Refill: 0    3. Fatigue, unspecified type  - TSH With Reflex Ft4; Future    4. Excessive weight gain  - TSH With Reflex Ft4; Future    5. Flu vaccine need  - INFLUENZA, QUADV, 3 YRS AND OLDER, IM, MDV, 0.5ML (AFLURIA QUADV)  - [place] ADMIN INFLUENZA VIRUS VAC          FOLLOW UP AND INSTRUCTIONS:   Return in about 6 months (around 3/17/2022). 1. Amber Mark received counseling on the following healthy behaviors: nutrition, exercise and medication adherence    2. Reviewed prior labs and health maintenance. 3. Discussed use, benefit, and side effects of prescribed medications. Barriers to medication compliance addressed. All patient questions answered. Pt voiced understanding.      4. Patient given educational materials - see patient instructions      America Cruz MD, PGY-3  [address] [place] [address] Internal [address]  9/17/2021 5:06 PM

## 2021-09-17 NOTE — PATIENT INSTRUCTIONS
Return To Clinic around 3/17/2022 for 6 month follow up. Patient was added to wait list. Patient will be contacted by office to schedule upcoming appointment with the physician. After Visit Summary given and reviewed. The medication list included in this document is our record of what you are currently taking, including any changes that were made at today's visit. If you find any differences when compared to your medications at home, or have any questions that were not answered at your visit, please contact the office. It is very important for your care that you keep your appointment. If for some reason you are unable to keep your appointment, it is equally important that you call our office at [phone] to cancel your appointment and reschedule. Failure to do so may result in your termination from our practice. Medications have been e-scribed to pharmacy of patients choice. LABORATORY INSTRUCTIONS    Your doctor has ordered blood or urine testing. You can get this testing done at the Lab located on the first floor of the [facility], or at any other [facility]. Please stop at Main Registration, before going to the lab, as you must be registered first.     Please get this lab done before your next visit. You may eat or drink before this test.    Patient was administered vaccination(s) for Influenza in the office. VIS given to patient for each vaccination given.     -CASEY Marinelli       Patient Education        Influenza (Flu) Vaccine: Care Instructions  Your Care Instructions     Influenza (flu) is an infection in the lungs and breathing passages. It is caused by the influenza virus. There are different strains, or types, of the flu virus every year. The flu comes on quickly. It can cause a cough, stuffy nose, fever, chills, tiredness, and aches and pains. These symptoms may last up to 10 days.  The flu can make you feel very sick, but most of the time it doesn't lead to other problems. But it can cause serious problems in people who are older or who have a long-term illness, such as heart disease or diabetes. You can help prevent the flu by getting a flu vaccine every year, as soon as it is available. You cannot get the flu from the vaccine. The vaccine prevents most cases of the flu. But even when the vaccine doesn't prevent the flu, it can make symptoms less severe and reduce the chance of problems from the flu. Sometimes, young children and people who have an immune system problem may have a slight fever or muscle aches or pains 6 to 12 hours after getting the shot. These symptoms usually last 1 or 2 days. Follow-up care is a key part of your treatment and safety. Be sure to make and go to all appointments, and call your doctor if you are having problems. It's also a good idea to know your test results and keep a list of the medicines you take. Who should get the flu vaccine? Everyone age 7 months or older should get a flu vaccine each year. It lowers the chance of getting and spreading the flu. The vaccine is very important for people who are at high risk for getting other health problems from the flu. This includes:  · Anyone 48years of age or older. · People who live in a long-term care center, such as a nursing home. · All children 6 months through 25years of age. · Adults and children 6 months and older who have long-term heart or lung problems, such as asthma. · Adults and children 6 months and older who needed medical care or were in a hospital during the past year because of diabetes, chronic kidney disease, or a weak immune system (including HIV or AIDS). · Women who will be pregnant during the flu season. · People who have any condition that can make it hard to breathe or swallow (such as a brain injury or muscle disorders). · People who can give the flu to others who are at high risk for problems from the flu.  This includes all health care workers and close contacts of people age 72 or older. Who should not get the flu vaccine? The person who gives the vaccine may tell you not to get it if you:  · Have a severe allergy to eggs or any part of the vaccine. · Have had a severe reaction to a flu vaccine in the past.  · Have had Guillain-Barré syndrome (GBS). · Are sick with a fever. (Get the vaccine when symptoms are gone.)  How can you care for yourself at home? · If you or your child has a sore arm or a slight fever after the vaccine, take an over-the-counter pain medicine, such as acetaminophen (Tylenol) or ibuprofen (Advil, Motrin). Read and follow all instructions on the label. Do not give aspirin to anyone younger than 20. It has been linked to Reye syndrome, a serious illness. · Do not take two or more pain medicines at the same time unless the doctor told you to. Many pain medicines have acetaminophen, which is Tylenol. Too much acetaminophen (Tylenol) can be harmful. When should you call for help? Call 911 anytime you think you may need emergency care. For example, call if after getting the flu vaccine:    · You have symptoms of a severe reaction to the flu vaccine. Symptoms of a severe reaction may include:  ? Severe difficulty breathing. ? Sudden raised, red areas (hives) all over your body. ? Severe lightheadedness. Call your doctor now or seek immediate medical care if after getting the flu vaccine:    · You think you are having a reaction to the flu vaccine, such as a new fever. Watch closely for changes in your health, and be sure to contact your doctor if you have any problems. Where can you learn more? Go to [email or web address] org and sign in to your Fundera account. Enter U834 in the Gungroo box to learn more about \"Influenza (Flu) Vaccine: Care Instructions. \"     If you do not have an account, please click on the \"Sign Up Now\" link.   Current as of: August 31, 2020               Content Version: 12.9  © 6297-6929 Healthwise, Incorporated. Care instructions adapted under license by [facility] ([facility]). If you have questions about a medical condition or this instruction, always ask your healthcare professional. [address] any warranty or liability for your use of this information.

## 2021-11-11 ENCOUNTER — TELEPHONE (OUTPATIENT)
Dept: OBGYN | Age: 26
End: 2021-11-11

## 2021-12-20 ENCOUNTER — HOSPITAL ENCOUNTER (EMERGENCY)
Age: 26
Discharge: LEFT BEFORE DISCHARGE | End: 2021-12-20

## 2021-12-20 VITALS
BODY MASS INDEX: 29.88 KG/M2 | SYSTOLIC BLOOD PRESSURE: 135 MMHG | DIASTOLIC BLOOD PRESSURE: 65 MMHG | TEMPERATURE: 102.4 F | HEART RATE: 124 BPM | WEIGHT: 175 LBS | HEIGHT: 64 IN | RESPIRATION RATE: 18 BRPM | OXYGEN SATURATION: 100 %

## 2021-12-20 ASSESSMENT — PAIN DESCRIPTION - PAIN TYPE: TYPE: ACUTE PAIN

## 2021-12-20 ASSESSMENT — PAIN DESCRIPTION - ORIENTATION: ORIENTATION: LEFT;RIGHT

## 2021-12-20 ASSESSMENT — PAIN DESCRIPTION - LOCATION: LOCATION: BACK;LEG

## 2021-12-20 ASSESSMENT — PAIN DESCRIPTION - FREQUENCY: FREQUENCY: CONTINUOUS

## 2021-12-20 ASSESSMENT — PAIN DESCRIPTION - DESCRIPTORS: DESCRIPTORS: ACHING

## 2021-12-20 ASSESSMENT — PAIN SCALES - GENERAL: PAINLEVEL_OUTOF10: 10

## 2022-01-04 ENCOUNTER — TELEPHONE (OUTPATIENT)
Dept: INTERNAL MEDICINE | Age: 27
End: 2022-01-04

## 2022-01-24 ENCOUNTER — TELEPHONE (OUTPATIENT)
Dept: OBGYN | Age: 27
End: 2022-01-24

## 2022-01-25 NOTE — TELEPHONE ENCOUNTER
Patient return a call made to her this morning. Please contact the patient at [phone]. Thank you.

## 2022-01-26 ENCOUNTER — TELEPHONE (OUTPATIENT)
Dept: INTERNAL MEDICINE | Age: 27
End: 2022-01-26

## 2022-02-18 ENCOUNTER — OFFICE VISIT (OUTPATIENT)
Dept: INTERNAL MEDICINE | Age: 27
End: 2022-02-18
Payer: COMMERCIAL

## 2022-02-18 VITALS
SYSTOLIC BLOOD PRESSURE: 130 MMHG | HEIGHT: 64 IN | BODY MASS INDEX: 31.76 KG/M2 | HEART RATE: 76 BPM | WEIGHT: 186 LBS | DIASTOLIC BLOOD PRESSURE: 78 MMHG

## 2022-02-18 DIAGNOSIS — N76.0 BACTERIAL VAGINOSIS: ICD-10-CM

## 2022-02-18 DIAGNOSIS — Z13.220 SCREENING FOR LIPID DISORDERS: ICD-10-CM

## 2022-02-18 DIAGNOSIS — G43.109 MIGRAINE WITH AURA AND WITHOUT STATUS MIGRAINOSUS, NOT INTRACTABLE: Primary | ICD-10-CM

## 2022-02-18 DIAGNOSIS — B96.89 BACTERIAL VAGINOSIS: ICD-10-CM

## 2022-02-18 DIAGNOSIS — E66.09 CLASS 1 OBESITY DUE TO EXCESS CALORIES WITH SERIOUS COMORBIDITY AND BODY MASS INDEX (BMI) OF 30.0 TO 30.9 IN ADULT: ICD-10-CM

## 2022-02-18 DIAGNOSIS — I10 PRIMARY HYPERTENSION: ICD-10-CM

## 2022-02-18 DIAGNOSIS — E55.9 VITAMIN D DEFICIENCY: ICD-10-CM

## 2022-02-18 PROBLEM — I15.9 SECONDARY HYPERTENSION: Status: RESOLVED | Noted: 2021-04-30 | Resolved: 2022-02-18

## 2022-02-18 PROBLEM — O13.9 GESTATIONAL HYPERTENSION: Status: RESOLVED | Noted: 2020-09-04 | Resolved: 2022-02-18

## 2022-02-18 PROBLEM — Z30.430 ENCOUNTER FOR INSERTION OF MIRENA IUD: Status: RESOLVED | Noted: 2020-10-21 | Resolved: 2022-02-18

## 2022-02-18 PROBLEM — F32.A DEPRESSION: Status: RESOLVED | Noted: 2021-04-30 | Resolved: 2022-02-18

## 2022-02-18 PROCEDURE — G8427 DOCREV CUR MEDS BY ELIG CLIN: HCPCS | Performed by: STUDENT IN AN ORGANIZED HEALTH CARE EDUCATION/TRAINING PROGRAM

## 2022-02-18 PROCEDURE — G8482 FLU IMMUNIZE ORDER/ADMIN: HCPCS | Performed by: STUDENT IN AN ORGANIZED HEALTH CARE EDUCATION/TRAINING PROGRAM

## 2022-02-18 PROCEDURE — G8417 CALC BMI ABV UP PARAM F/U: HCPCS | Performed by: STUDENT IN AN ORGANIZED HEALTH CARE EDUCATION/TRAINING PROGRAM

## 2022-02-18 PROCEDURE — 99213 OFFICE O/P EST LOW 20 MIN: CPT | Performed by: STUDENT IN AN ORGANIZED HEALTH CARE EDUCATION/TRAINING PROGRAM

## 2022-02-18 PROCEDURE — 1036F TOBACCO NON-USER: CPT | Performed by: STUDENT IN AN ORGANIZED HEALTH CARE EDUCATION/TRAINING PROGRAM

## 2022-02-18 RX ORDER — MELATONIN
1000 DAILY
Qty: 90 TABLET | Refills: 1 | Status: SHIPPED | OUTPATIENT
Start: 2022-02-18 | End: 2022-10-12 | Stop reason: SDUPTHER

## 2022-02-18 RX ORDER — TOPIRAMATE 25 MG/1
25 TABLET ORAL NIGHTLY
Qty: 30 TABLET | Refills: 3 | Status: SHIPPED | OUTPATIENT
Start: 2022-02-18

## 2022-02-18 RX ORDER — METRONIDAZOLE 500 MG/1
500 TABLET ORAL 2 TIMES DAILY
Qty: 14 TABLET | Refills: 0 | Status: SHIPPED | OUTPATIENT
Start: 2022-02-18 | End: 2022-02-25

## 2022-02-18 RX ORDER — ACETAMINOPHEN, ASPIRIN AND CAFFEINE 250; 250; 65 MG/1; MG/1; MG/1
1 TABLET, FILM COATED ORAL EVERY 6 HOURS PRN
COMMUNITY
End: 2022-02-18 | Stop reason: ALTCHOICE

## 2022-02-18 RX ORDER — SUMATRIPTAN 50 MG/1
50 TABLET, FILM COATED ORAL
Qty: 27 TABLET | Refills: 1 | Status: SHIPPED | OUTPATIENT
Start: 2022-02-18 | End: 2023-10-12

## 2022-02-18 ASSESSMENT — ENCOUNTER SYMPTOMS
DIARRHEA: 0
NAUSEA: 0
SHORTNESS OF BREATH: 0
COUGH: 0
PHOTOPHOBIA: 0
WHEEZING: 0
VOMITING: 0
TROUBLE SWALLOWING: 0

## 2022-02-18 NOTE — PROGRESS NOTES
Attending Physician Statement  I have discussed the care of [facility], including pertinent history and exam findings,  with the resident. I have reviewed the key elements of all parts of the encounter with the resident. I agree with the assessment, plan and orders as documented by the resident.   (GE Modifier)

## 2022-02-18 NOTE — TELEPHONE ENCOUNTER
PC from pharmacy - Magnesium Hydroxide 400mg chewables is not formulary (pedialax) Brianna Granados states that Milk of Magneisum or Miralax are covered.

## 2022-02-18 NOTE — PATIENT INSTRUCTIONS
Medications e-scribe to pharmacy of pt's choice. Scripts for lab given to pt with fasting instructions, pt will get labs done as soon as possible. Patient to return to clinic 3 months (5/13/22). AVS reviewed and given to pt. It is very important for your care that you keep your appointment. If for some reason you are unable to keep your appointment it is equally important that you call our office at [phone] to cancel your appointment and reschedule. Failure to do so may result in your termination from our practice.   MB

## 2022-02-18 NOTE — PROGRESS NOTES
[facility]    Progress Note    Date of patient's visit: 2/18/2022    Patient's Name:  Vita Clifton    YOB: 1995            Patient Care Team:  Debi Moore MD as PCP - General (Emergency Medicine)  Gio Rahman MD as Obstetrician (Perinatology)    REASON FOR VISIT: Routine outpatient follow    Chief Complaint   Patient presents with    Hypertension     5-6 month follow up         HISTORY OF PRESENT ILLNESS:    History was obtained from the patient. C/o headaches frontal and whole head. Bothered by lights, sounds. And nauseated. Has pelvic pain, some watery vaginal discharge, went to urgent care given diflucan, flagyl cream which pt didn't take it. Still having symptoms. BV only +ve last month. HTN: secondary HTN work up negative. TSH pending. Obese with BMI 30, depression, vit D def and anemia. Depression rescoring-   TSH. ,CMP, iron studies still pending. aksed to get labs. On Paroxetine 20 for a month. Then she felt anxious, decreased appetitie so stopped by herself. 2020 had baby, at that time diagnosed post partum depression. curerrently depression screen negative, changed job and occasional low mood without affecting her daily life. Vit D 12 week course completed. start taking daily 1000 IU    on liquid iron for ED, labs from 2018, repeat check labs still pending. currently denies symptoms of pica, fatigue and restless leg syndrome. Pap smear negative 3/2020, follows with Ob gyn. Has IUD in place. Having pelvic pain, wants IUD removed, ob gyn visit on 25th. Denies smoking, alcohol.       Past Medical History:   Diagnosis Date    Depression 04/30/2021    Gestational hypertension (G1) 09/04/2020    History of blood transfusion 2018    From Motrin use     Hx of blood transfusion (2018) 03/02/2020    Due to GI bleed secondary to Motrin use    Menometrorrhagia 05/10/2018    Symptomatic anemia 04/17/2018    From disturbance. Respiratory: Negative for cough, shortness of breath and wheezing. Cardiovascular: Negative for chest pain, palpitations and leg swelling. Gastrointestinal: Negative for diarrhea, nausea and vomiting. Endocrine: Negative for polydipsia, polyphagia and polyuria. Genitourinary: Negative for dysuria, frequency and urgency. Musculoskeletal: Negative for arthralgias, joint swelling and myalgias. Skin: Negative for rash. Neurological: Negative for weakness, numbness and headaches. Hematological: Negative for adenopathy. Does not bruise/bleed easily. Psychiatric/Behavioral: Negative for agitation, sleep disturbance and suicidal ideas. PHYSICAL EXAM:     Vitals:    02/18/22 1344   BP: 130/78   Pulse: 76   Weight: 186 lb (84.4 kg)   Height: 5' 4\" (1.626 m)     Body mass index is 31.93 kg/m². BP Readings from Last 3 Encounters:   02/18/22 130/78   12/20/21 135/65   09/17/21 134/83        Wt Readings from Last 3 Encounters:   02/18/22 186 lb (84.4 kg)   12/20/21 175 lb (79.4 kg)   09/17/21 175 lb 6.4 oz (79.6 kg)       Physical Exam  Constitutional:       Appearance: She is well-developed. HENT:      Head: Normocephalic and atraumatic. Eyes:      Extraocular Movements: Extraocular movements intact. Pupils: Pupils are equal, round, and reactive to light. Cardiovascular:      Rate and Rhythm: Normal rate and regular rhythm. Pulses: Normal pulses. Heart sounds: Normal heart sounds. No murmur heard. Pulmonary:      Effort: No respiratory distress. Breath sounds: Normal breath sounds. No wheezing or rales. Abdominal:      General: Bowel sounds are normal. There is no distension. Palpations: Abdomen is soft. Tenderness: There is no abdominal tenderness. Musculoskeletal:         General: Normal range of motion. Cervical back: Normal range of motion and neck supple. Skin:     General: Skin is warm and dry.    Neurological:      General: No focal deficit present. Mental Status: She is alert and oriented to person, place, and time. Mental status is at baseline. Psychiatric:         Mood and Affect: Mood normal.         Behavior: Behavior normal.         Thought Content: Thought content normal.         LABORATORY FINDINGS:    CBC:  Lab Results   Component Value Date    WBC 3.8 06/01/2021    HGB 10.7 06/01/2021     06/01/2021     BMP:    Lab Results   Component Value Date     06/01/2021    K 3.7 06/01/2021     06/01/2021    CO2 21 06/01/2021    BUN 12 06/01/2021    CREATININE 0.58 06/01/2021    GLUCOSE 101 06/01/2021     HEMOGLOBIN A1C: No results found for: LABA1C  MICROALBUMIN URINE:   Lab Results   Component Value Date    MICROALBUR 22 06/01/2021     FASTING LIPID PANEL:No results found for: CHOL, HDL, TRIG  No results found for: LDLCALC, LDLCHOLESTEROL, LDLDIRECT    LIVER PROFILE:  Lab Results   Component Value Date    ALT <5 10/29/2020    AST 7 10/29/2020    PROT 5.9 10/29/2020    BILITOT 0.16 10/29/2020    BILIDIR <0.08 08/13/2018    LABALBU 3.1 10/29/2020      THYROID FUNCTION: No results found for: TSH   URINEANALYSIS: No results found for: LABURIN  ASSESSMENT AND PLAN:        1. Migraine with aura and without status migrainosus, not intractable  Symptoms affecting her work with few KERI from work. On Excedrine with mild control. Will start ppx with magnesium, B2 and topamax. 2. Hypertension, unspecified type  130/80 mmHg, gained weight, feels fatigue, eats lot of ice. Will check lipid levels. Get labs done. F/u TSH, CMP  Secondary HTN work up negative. No sleep apena symptoms      3. Vitamin D deficiency  Completed 12 wk course. Start daily 1000 IU    4. BV: flagyl BID for 7 days        FOLLOW UP AND INSTRUCTIONS:   No follow-ups on file. 1. Maryellen Martinez received counseling on the following healthy behaviors: nutrition, exercise and tobacco cessation    2. Reviewed prior labs and health maintenance.       3. Discussed use, benefit, and side effects of prescribed medications. Barriers to medication compliance addressed. All patient questions answered. Pt voiced understanding.      4. Patient given educational materials - see patient instructions      Stephanie Tran MD, PGY-3  [facility] Internal [address]  2/18/2022 2:12 PM

## 2022-02-22 NOTE — TELEPHONE ENCOUNTER
Milk of Mg ordered instead of tablet for Insurance. However both have same mechanism to help preventing migraine.     Thank you

## 2022-02-25 ENCOUNTER — TELEPHONE (OUTPATIENT)
Dept: OBGYN | Age: 27
End: 2022-02-25

## 2022-02-26 ENCOUNTER — HOSPITAL ENCOUNTER (EMERGENCY)
Age: 27
Discharge: HOME | End: 2022-02-26
Attending: EMERGENCY MEDICINE
Payer: COMMERCIAL

## 2022-02-26 VITALS
HEART RATE: 110 BPM | SYSTOLIC BLOOD PRESSURE: 139 MMHG | DIASTOLIC BLOOD PRESSURE: 77 MMHG | BODY MASS INDEX: 31.34 KG/M2 | TEMPERATURE: 100.1 F | WEIGHT: 182.6 LBS | OXYGEN SATURATION: 98 % | RESPIRATION RATE: 20 BRPM

## 2022-02-26 DIAGNOSIS — H60.92 OTITIS EXTERNA OF LEFT EAR, UNSPECIFIED CHRONICITY, UNSPECIFIED TYPE: Primary | ICD-10-CM

## 2022-02-26 DIAGNOSIS — J02.9 VIRAL PHARYNGITIS: ICD-10-CM

## 2022-02-26 LAB
DIRECT EXAM: NORMAL
SPECIMEN DESCRIPTION: NORMAL

## 2022-02-26 PROCEDURE — 99283 EMERGENCY DEPT VISIT LOW MDM: CPT

## 2022-02-26 PROCEDURE — 87880 STREP A ASSAY W/OPTIC: CPT

## 2022-02-26 RX ORDER — ACETAMINOPHEN 325 MG/1
650 TABLET ORAL EVERY 6 HOURS PRN
Qty: 30 TABLET | Refills: 0 | Status: SHIPPED | OUTPATIENT
Start: 2022-02-26

## 2022-02-26 ASSESSMENT — ENCOUNTER SYMPTOMS
SHORTNESS OF BREATH: 0
SINUS PAIN: 0
TROUBLE SWALLOWING: 0
RHINORRHEA: 0
COUGH: 0
FACIAL SWELLING: 0
SORE THROAT: 1

## 2022-02-26 ASSESSMENT — PAIN SCALES - GENERAL: PAINLEVEL_OUTOF10: 5

## 2022-02-26 NOTE — ED PROVIDER NOTES
tablet Historical Med      magnesium hydroxide (MILK OF MAGNESIA) 400 MG/5ML suspension Take 5 mLs by mouth dailyOTC      vitamin D3 (CHOLECALCIFEROL) 25 MCG (1000 UT) TABS tablet Take 1 tablet by mouth daily, Disp-90 tablet, R-1Normal      topiramate (TOPAMAX) 25 MG tablet Take 1 tablet by mouth nightly, Disp-30 tablet, R-3Normal      SUMAtriptan (IMITREX) 50 MG tablet Take 1 tablet by mouth once as needed for Migraine, Disp-27 tablet, R-1Normal             ALLERGIES     Motrin [ibuprofen] and Fish-derived products    FAMILY HISTORY       Family History   Problem Relation Age of Onset    No Known Problems Mother     No Known Problems Father     Other Maternal Grandmother         diverticulitis    No Known Problems Paternal Grandfather     No Known Problems Paternal Grandmother     No Known Problems Maternal Grandfather     No Known Problems Brother     No Known Problems Sister     Breast Cancer Neg Hx     Cancer Neg Hx     Colon Cancer Neg Hx     Diabetes Neg Hx     Eclampsia Neg Hx     Hypertension Neg Hx     Ovarian Cancer Neg Hx      Labor Neg Hx     Spont Abortions Neg Hx     Stroke Neg Hx           SOCIAL HISTORY       Social History     Socioeconomic History    Marital status: Single     Spouse name: None    Number of children: None    Years of education: None    Highest education level: None   Occupational History    None   Tobacco Use    Smoking status: Never Smoker    Smokeless tobacco: Never Used   Vaping Use    Vaping Use: Never used   Substance and Sexual Activity    Alcohol use: No    Drug use: No    Sexual activity: Not Currently     Partners: Male     Birth control/protection: Injection   Other Topics Concern    None   Social History Narrative    None     Social Determinants of Health     Financial Resource Strain: Medium Risk    Difficulty of Paying Living Expenses: Somewhat hard   Food Insecurity: No Food Insecurity    Worried About Running Out of Food in the Last Year: Never true    Ran Out of Food in the Last Year: Never true   Transportation Needs:     Lack of Transportation (Medical): Not on file    Lack of Transportation (Non-Medical): Not on file   Physical Activity:     Days of Exercise per Week: Not on file    Minutes of Exercise per Session: Not on file   Stress:     Feeling of Stress : Not on file   Social Connections:     Frequency of Communication with Friends and Family: Not on file    Frequency of Social Gatherings with Friends and Family: Not on file    Attends Voodoo Services: Not on file    Active Member of [address] [place] [address] [employer] or Organizations: Not on file    Attends Club or Organization Meetings: Not on file    Marital Status: Not on file   Intimate Partner Violence:     Fear of Current or Ex-Partner: Not on file    Emotionally Abused: Not on file    Physically Abused: Not on file    Sexually Abused: Not on file   Housing Stability:     Unable to Pay for Housing in the Last Year: Not on file    Number of [place] in the Last Year: Not on file    Unstable Housing in the Last Year: Not on file         REVIEW OF SYSTEMS    (2-9 systems for level 4, 10 or more for level 5)     Review of Systems   Constitutional: Positive for fever. HENT: Positive for ear pain and sore throat. Negative for ear discharge, facial swelling, rhinorrhea, sinus pain and trouble swallowing. Respiratory: Negative for cough and shortness of breath. Neurological: Negative for headaches. All other systems reviewed and are negative. Except as noted above the remainder of the review of systems was reviewed and negative. PHYSICAL EXAM    (up to 7 for level 4, 8 or more for level 5)     ED Triage Vitals [02/26/22 1817]   BP Temp Temp src Pulse Resp SpO2 Height Weight   139/77 100.1 °F (37.8 °C) -- 110 20 98 % -- 182 lb 9.6 oz (82.8 kg)       Physical Exam  Constitutional:       General: She is not in acute distress. Appearance: Normal appearance.  She is well-developed, well-groomed and normal weight. HENT:      Head: Normocephalic. Right Ear: Hearing, tympanic membrane, ear canal and external ear normal.      Left Ear: Hearing, tympanic membrane and external ear normal. Swelling and tenderness present. No drainage. No middle ear effusion. There is no impacted cerumen. No foreign body. No mastoid tenderness. Tympanic membrane is not erythematous or bulging. Ears:      Comments: Exam of the left ear shows some swelling, erythema and tenderness to the left ear canal.  TM is normal in appearance. No mastoid tenderness. No drainage from the ear. Nose: Nose normal.      Mouth/Throat:      Lips: Pink. Mouth: Mucous membranes are moist.      Pharynx: Uvula midline. Posterior oropharyngeal erythema present. No pharyngeal swelling, oropharyngeal exudate or uvula swelling. Tonsils: Tonsillar exudate present. 2+ on the right. 2+ on the left. Comments: Throat is erythematous. No swelling. Uvula is midline no swelling. Tonsils 2+ symmetrically. Exudate noted on the left tonsil. Patient is controlling her secretions. Eyes:      Extraocular Movements: Extraocular movements intact. Conjunctiva/sclera: Conjunctivae normal.      Pupils: Pupils are equal, round, and reactive to light. Pulmonary:      Effort: Pulmonary effort is normal. No respiratory distress. Musculoskeletal:         General: Normal range of motion. Cervical back: Normal range of motion. Skin:     General: Skin is warm and dry. Capillary Refill: Capillary refill takes less than 2 seconds. Neurological:      Mental Status: She is alert and oriented to person, place, and time.    Psychiatric:         Mood and Affect: Mood normal.         Behavior: Behavior normal.         Judgment: Judgment normal.           DIAGNOSTIC RESULTS     EKG:All EKG's are interpreted by the Emergency Department Physician who either signs or Co-signs this chart in the absence of a cardiologist.        RADIOLOGY:   Non-plain film images such as CT, Ultrasound and MRI are read by theradiologist. Plain radiographic images are visualized and preliminarily interpreted by the emergency physician with the below findings:        Interpretation per the Radiologist below, if available at the time of this note:    No orders to display         EDBEDSIDE ULTRASOUND:   Performed by Sai Mccoy - denys    LABS:  Labs Reviewed   STREP SCREEN GROUP A THROAT       All other labs were within normal range or not returned as of this dictation. EMERGENCY DEPARTMENT COURSE andDIFFERENTIAL DIAGNOSIS/MDM:   Patient is nondistressed. Temp 100.1. Exam of the left ear shows otitis externa. No mastoid tenderness. Left TM normal in appearance. Throat is erythematous patient is controlling her secretions. Tonsils 2+ symmetrically. I Do not suspect peritonsillar abscess. Strep screen negative. Will discharge on Cortisporin and Tylenol. Instructed follow-up with PCP. Return precautions provided. Vitals:    Vitals:    02/26/22 1817   BP: 139/77   Pulse: 110   Resp: 20   Temp: 100.1 °F (37.8 °C)   SpO2: 98%   Weight: 182 lb 9.6 oz (82.8 kg)         CONSULTS:  None    RES:  Procedures    FINAL IMPRESSION      1. Otitis externa of left ear, unspecified chronicity, unspecified type    2.  Viral pharyngitis          DISPOSITION/PLAN   DISPOSITION Decision To Discharge 02/26/2022 07:12:35 PM      PATIENT REFERRED TO:   Phil Lombardo MD  [address] [phone]    Schedule an appointment as soon as possible for a visit       [facility] ED  [address] [facility]  [phone]    If symptoms worsen      DISCHARGE MEDICATIONS:     Discharge Medication List as of 2/26/2022  7:15 PM      START taking these medications    Details   neomycin-polymyxin-hydrocortisone (CORTISPORIN) 3.5-37300-9 otic solution Place 4 drops into the left ear 3 times daily for 10 days Instill into Left Ear, Disp-10 mL, R-0Print      acetaminophen (TYLENOL) 325 MG tablet Take 2 tablets by mouth every 6 hours as needed for Pain, Disp-30 tablet, R-0Print           Electronically signed by NIEVES Clemons 2/26/2022 at 8:57 PM           NIEVES Clemons CNP  02/26/22 [id number]

## 2022-03-01 NOTE — ED PROVIDER NOTES
eMERGENCY dEPARTMENT eNCOUnter   Independent Attestation     Pt Name: Zackery Zafar  MRN: [id number]  [place] 1995  Date of evaluation: 3/1/22     Zackery Zafar is a 31 y.o. female with CC: Otalgia (left ear x3 days. Hx of ear infections)        This visit was performed by both a physician and an APC. I performed all aspects of the MDM as documented. The care is provided during an unprecedented national emergency due to the novel coronavirus, COVID 19.     Yane Mclaughlin MD  Attending Emergency Physician          Garcia Lr MD  03/01/22 [id number]

## 2022-04-27 ENCOUNTER — HOSPITAL ENCOUNTER (OUTPATIENT)
Age: 27
Discharge: HOME | End: 2022-04-27

## 2022-04-27 LAB — HCG, PREGNANCY URINE (POC): NEGATIVE

## 2022-04-27 PROCEDURE — 81025 URINE PREGNANCY TEST: CPT

## 2022-04-27 PROCEDURE — 86735 MUMPS ANTIBODY: CPT

## 2022-04-27 PROCEDURE — 86765 RUBEOLA ANTIBODY: CPT

## 2022-04-27 PROCEDURE — 86481 TB AG RESPONSE T-CELL SUSP: CPT

## 2022-04-29 LAB
MEASLES ANTIBODY IGG: 2.6
MUV IGG SER QL: 1.97

## 2022-04-30 LAB — T-SPOT TB TEST: NORMAL

## 2022-05-06 ENCOUNTER — HOSPITAL ENCOUNTER (OUTPATIENT)
Age: 27
Setting detail: SPECIMEN
Discharge: HOME | End: 2022-05-06

## 2022-05-06 ENCOUNTER — OFFICE VISIT (OUTPATIENT)
Dept: OBGYN | Age: 27
End: 2022-05-06
Payer: COMMERCIAL

## 2022-05-06 VITALS
HEART RATE: 90 BPM | DIASTOLIC BLOOD PRESSURE: 85 MMHG | SYSTOLIC BLOOD PRESSURE: 124 MMHG | WEIGHT: 179.6 LBS | HEIGHT: 64 IN | BODY MASS INDEX: 30.66 KG/M2

## 2022-05-06 DIAGNOSIS — Z30.09 FAMILY PLANNING COUNSELING: ICD-10-CM

## 2022-05-06 DIAGNOSIS — Z30.432 ENCOUNTER FOR IUD REMOVAL: ICD-10-CM

## 2022-05-06 DIAGNOSIS — Z01.419 WELL WOMAN EXAM: Primary | ICD-10-CM

## 2022-05-06 DIAGNOSIS — Z01.419 WELL WOMAN EXAM: ICD-10-CM

## 2022-05-06 PROCEDURE — 99395 PREV VISIT EST AGE 18-39: CPT | Performed by: STUDENT IN AN ORGANIZED HEALTH CARE EDUCATION/TRAINING PROGRAM

## 2022-05-06 PROCEDURE — 99211 OFF/OP EST MAY X REQ PHY/QHP: CPT | Performed by: OBSTETRICS & GYNECOLOGY

## 2022-05-06 NOTE — PATIENT INSTRUCTIONS
Patient Education        Implant for Birth Control: Care Instructions  Your Care Instructions     The implant is used to prevent pregnancy. It's a thin rivera about the size of a matchstick that is inserted under the skin (subdermal) on the inside of yourarm. The implant prevents pregnancy for 3 years. After it is put in, you don't haveto do anything else to prevent pregnancy. Follow-up care is a key part of your treatment and safety. Be sure to make and go to all appointments, and call your doctor if you are having problems. It's also a good idea to know your test results and keep alist of the medicines you take. How can you care for yourself at home? How do you use the subdermal implant?  The implant is put in by your doctor or another trained health professional. It only takes a few minutes. This can also be done right after you give birth. Your doctor will remove the implant when it needs to be taken out.  An adhesive bandage and a tight (pressure) bandage will be placed over the site. This helps reduce swelling and bruising.  Ask your doctor if you need to use backup birth control, such as a condom, for a week after insertion. Whether you need to do this depends on where you are in your cycle. How can you care for the insertion site?  Remove the pressure bandage after 24 hours. Keep the area dry.  Keep an adhesive bandage on the site for 3 to 5 days after the procedure.  If you have pain, use an ice pack or take an over-the-counter pain medicine. Some soreness or bruising is normal.  What else do you need to know?  It's safe to use while breastfeeding.  The implant has side effects. ? You may have changes in your period. Your period may stop. You may also have spotting or bleeding between periods. ? You may have mood changes, less interest in sex, or weight gain.  The implant can stay in place for up to 3 years to prevent pregnancy.  But your doctor may talk to you about leaving it in for longer. ? If you don't replace the implant and don't use another form of birth control, you could get pregnant. ? If you have the implant removed, you'll have to find another method of birth control. If you don't, you may get pregnant. ? Even if you are planning to get pregnant, you have to have the implant removed.  Check with your doctor before you use any other medicines. This includes over-the-counter medicines, vitamins, herbal products, and supplements. Birth control hormones may not work as well to prevent pregnancy when combined with other medicines.  The implant doesn't protect against sexually transmitted infections (STIs), such as herpes or HIV/AIDS. If you're not sure if your sex partner might have an STI, use a condom to protect against infection. When should you call for help? Call 911  anytime you think you may need emergency care. For example, call if:     You have shortness of breath.      You have chest pain.      You passed out (lost consciousness).      You cough up blood. Call your doctor now or seek immediate medical care if:     You have severe pain or numbness and tingling in the arm where the implant was inserted.      You have increased pain, swelling, warmth, or redness at the insertion site.      You have signs of a blood clot in your leg (called a deep vein thrombosis), such as:  ? Pain in your calf, back of the knee, thigh, or groin. ? Redness and swelling in your leg. Watch closely for changes in your health, and be sure to contact your doctor if:     You can't feel your implant.      You think your implant might be bent or broken in your arm.      You think you might be pregnant.      You have any problems with your birth control method. Where can you learn more? Go to [email or web address]://mario.[email or web address] org and sign in to your [employer] account.  Enter G733 in the [facility] box to learn more about \"Implant for Birth Control: Care Instructions. \"     If you do not have an account, please click on the \"Sign Up Now\" link. Current as of: June 16, 2021               Content Version: 13.2  © 2006-2022 [employer]. Care instructions adapted under license by [facility] ([facility]). If you have questions about a medical condition or this instruction, always ask your healthcare professional. [address] any warranty or liability for your use of this information. Patient Education        progesterone  Pronunciation: leanne guerra charlie  Brand: First Progesterone MC10, Menopause Formula Progesterone, Prometrium  What is the most important information I should know about progesterone? You should not use progesterone if you have: abnormal vaginal bleeding, a history of breast cancer, liver disease, or if you have recently had a heartattack, stroke, or blood clot. Do not use if you are pregnant. Progesterone should not be used to prevent heart disease or dementia, becausethis medicine may actually increase your risk of developing these conditions. Using progesterone can increase your risk of blood clots, stroke, heart attack,or breast cancer. What is progesterone? Progesterone is a female hormone important for the regulation of ovulation andmenstruation. Progesterone is used to cause menstrual periods in women who have not yet reached menopause but are not having periods due to a lack of progesterone in the body. Progesterone is also used to prevent overgrowth in the lining of the uterus in postmenopausal women who are receiving estrogen hormone replacementtherapy. Progesterone should not be used to prevent heart disease or dementia, because this medicine may actually increase your risk of developing theseconditions. Progesterone may also be used for purposes not listed in this medication guide. What should I discuss with my healthcare provider before using progesterone?   You should not use progesterone if you are allergic to it, or if you have:   abnormal vaginal bleeding that a doctor has not checked;   a history of breast cancer;   liver disease;   a peanut allergy;   if you are pregnant;   if you have had a stroke, heart attack, or blood clot within the past year; or   if you have recently had an incomplete miscarriage or \"missed\" . Using progesterone can increase your risk of blood clots, stroke, heart attack,or breast cancer. To make sure this medicine is safe for you, tell your doctor if you have:   heart disease, circulation problems;   migraines;   asthma;   kidney disease;   seizures or epilepsy;   a history of depression; or   risk factors for coronary artery disease (such as high blood pressure, diabetes, lupus, high cholesterol, family history of coronary artery disease, smoking, being overweight). Do not use progesterone if you are pregnant. It could harm the unborn baby. Tell your doctor if you are pregnant or plan tobecome pregnant. Progesterone can pass into breast milk and may harm a nursing baby. Tell yourdoctor if you are breast-feeding a baby. How should I use progesterone? Follow all directions on your prescription label. Do not use this medicine inlarger or smaller amounts or for longer than recommended. Read all patient information, medication guides, and instruction sheetsprovided to you. Ask your doctor or pharmacist if you have any questions. Take the progesterone capsule with a full glass of water. It is best to take the medicine at night becauseprogesterone can make you dizzy or drowsy. Apply progesterone cream to the skin as directed by your doctor. Progesterone is sometimes used for only a short time, such as 10 to 12 days during each menstrual cycle. Follow your doctor's dosing instructions very carefully. Have regular physical exams and self-examine your breasts for lumps on amonthly basis while using progesterone.   If you need surgery or medical tests or if you will be on bed rest, you may need to stop using this medicine for a short time. Any doctor or surgeon whotreats you should know that you are using progesterone. Store at room temperature away from moisture, heat, and light. What happens if I miss a dose? Use the missed dose as soon as you remember. Skip the missed dose if it is almost time for your next scheduled dose. Do not use extra medicine to make up the missed dose. Call your doctor if you miss more than one dose of this medication. What happens if I overdose? Seek emergency medical attention or call the Poison Help line at 1-944.275.7437. What should I avoid while using progesterone? Progesterone may impair your thinking or reactions. Be careful if you drive ordo anything that requires you to be alert. What are the possible side effects of progesterone? Get emergency medical help if you have signs of an allergic reaction: hives; difficulty breathing; swelling of your face, lips, tongue, or throat.   Call your doctor at once if you have:   unusual vaginal bleeding;   pain or burning when you urinate;   a breast lump;   sudden vision problems, severe headache or pain behind your eyes;   symptoms of depression (sleep problems, weakness, mood changes);   severe dizziness or drowsiness, spinning sensation, confusion, shortness of breath;   heart attack symptoms --chest pain or pressure, pain spreading to your jaw or shoulder, nausea, sweating;   liver problems --nausea, upper stomach pain, itching, tired feeling, loss of appetite, dark urine, azeb-colored stools, jaundice (yellowing of the skin or eyes);   signs of a stroke --sudden numbness or weakness (especially on one side of the body), sudden severe headache, slurred speech, problems with speech or balance;   signs of a blood clot in the lung --chest pain, sudden cough, wheezing, rapid breathing, coughing up blood; or   signs of a blood clot in your leg --pain, swelling, warmth, or redness in one or both legs. Common side effects may include:   drowsiness, dizziness;   breast pain;   mood changes;   headache;   constipation, diarrhea, heartburn;   bloating, swelling in your hands or feet;   joint pain;   hot flashes; or   vaginal discharge. This is not a complete list of side effects and others may occur. Call your doctor for medical advice about side effects. You may report side effects toFDA at 1-453-OOC-7649. What other drugs will affect progesterone? There may be other drugs that can interact with progesterone. Tell your doctor about all medications you use. This includes prescription, over-the-counter, vitamin, and herbal products. Do not start a new medication without tellingyour doctor. Where can I get more information? Your pharmacist can provide more information about progesterone. Remember, keep this and all other medicines out of the reach of children, never share your medicines with others, and use this medication only for the indication prescribed. Every effort has been made to ensure that the information provided by [address] is accurate, up-to-date, and complete, but no guarantee is made to that effect. Drug information contained herein may be time sensitive. [facility] information has been compiled for use by healthcare practitioners and consumers in the [place] and therefore [employer] does not warrant that uses outside of the [place] are appropriate, unless specifically indicated otherwise. [facility] drug information does not endorse drugs, diagnose patients or recommend therapy. [facility] drug information is an informational resource designed to assist licensed healthcare practitioners in caring for their patients and/or to serve consumers viewing this service as a supplement to, and not a substitute for, the expertise, skill, knowledge and judgment of healthcare practitioners.  The absence of a warning for a given drug or drug combination in no way should be construed to indicate that the drug or drug combination is safe, effective or appropriate for any given patient. [facility] does not assume any responsibility for any aspect of healthcare administered with the aid of information [facility] provides. The information contained herein is not intended to cover all possible uses, directions, precautions, warnings, drug interactions, allergic reactions, or adverse effects. If you have questions about the drugs you are taking, check with yourdoctor, nurse or pharmacist.  Copyright 5057-4550 [address]. Version: 9.01. Revision date: 3/26/2015. Care instructions adapted under license by [facility] ([facility]). If you have questions about a medical condition or this instruction, always ask your healthcare professional. [address] any warranty or liability for your use of this information. Patient Education        Learning About Birth Control: Mini-Pills  What are mini-pills? Mini-pills are used to prevent pregnancy. They release a regular dose of a hormone called progestin. They are different from regular combination birthcontrol pills. Those contain progestin and another hormone called estrogen. Progestin prevents pregnancy in a few ways. It thickens the mucus in the cervix. This makes it hard for sperm to travel into the uterus. It also thins the lining of the uterus. This makes it harder for a fertilized egg to attachto the uterus. And progestin can sometimes stop the ovaries from releasing an egg each month(ovulation). Mini-pills come in packs. Every pill in the pack contains progestin. There are no spacer pills. You have to take a pill every day at the same time to preventpregnancy. This means you take a pill even when you have your period. How well do they work? In the first year of use:   When mini-pills are taken exactly as directed, fewer than 1 woman out of 100 has an unplanned pregnancy.    When pills are not taken exactly as directed, such as forgetting to take them sometimes, 9 women out of 100 have an unplanned pregnancy. Be sure to tell your doctor about any health problems you have or medicines you take. He or she can help you choose the birth control method that is right foryou. What are the advantages of mini-pills?  Mini-pills work better than barrier methods. Barrier methods include condoms and diaphragms.  They may cause fewer side effects than combination birth control pills. They may reduce heavy bleeding and cramping.  They don't contain estrogen. So you can use them if you don't want to take estrogen. They are also an option if you can't take estrogen because you have certain health problems or concerns.  They are safe to use while breastfeeding.  You don't have to interrupt sex to use them. What are the disadvantages of mini-pills?  Mini-pills don't protect against sexually transmitted infections (STIs), such as herpes or HIV/AIDS. If you aren't sure if your sex partner might have an STI, use a condom to protect against disease.  They may cause irregular periods. You may have spotting between periods. You may also stop getting a period. Some women see having no period as an advantage.  Mini-pills may cause mood changes, less interest in sex, or weight gain.  You must take a pill at the same time every day to prevent pregnancy. Where can you learn more? Go to [email or web address] and sign in to your [employer] account. Enter Z320 in the [facility] box to learn more about \"Learning About Birth Control: Mini-Pills. \"     If you do not have an account, please click on the \"Sign Up Now\" link. Current as of: June 16, 2021               Content Version: 13.2  © 0007-9140 Healthwise, [employer]. Care instructions adapted under license by [facility] ([facility]).  If you have questions about a medical condition or this instruction, always ask your healthcare professional. [address] any warranty or liability for your use of this information.

## 2022-05-06 NOTE — PROGRESS NOTES
[facility] OB/GYN Annual Visit  Date of patient's visit: 2022    Patient's Name:  Apolinar Reeder     YOB: 1995            Patient Care Team:  Juanita See MD as PCP - General (Emergency Medicine)  Xiang Simpson MD as Obstetrician (Perinatology)     SUBJECTIVE:    Chief Complaint:   Chief Complaint   Patient presents with    IUD Removal     frequent vag infections, pain with intercoarse pt states she cant enjoy sex,         History of Presenting Illness:    History was obtained from the patient. Apolinar Reeder is a 31 y.o. female     The patient was seen and examined. She is here for an annual visit. Today requesting IUD removal.  Patient had Mirena IUD placed in 2020. Patient states since her IUD she has had multiple BV and yeast infections. Patient reports very sensitive skin and does not use detergent when washing cloths. Patient states she does not douche or use any scented body washes. Her Patient's last menstrual period was 2022 (approximate). Patient reports since the IUD her menstrual cycles have been irregular. Patient reports some cycles are mild and there is moderate. Last 5 to 7 days. Taking Depo in the past however, it seemed to worsen her migraines. Patient does have migraines with aura takes Topamax. Patient does not smoke. No history of DVT or PE. Patient does not have hypertension. Cervical cancer screen: Negative done 3/2020    Her bowel habits are regular. She denies any bloating. She denies dysuria. She denies urinary leaking. She denies vaginal discharge. She is sexually active with single partner, contraception - IUD. She uses IUD for contraception and is not desiring pregnancy.       Depression Screen: Symptoms of decreased mood absent  Symptoms of anhedonia absent  **If either question is answered in a  positive fashion then complete the PHQ9 Scoring Evaluation and make the appropriate referral**    Review of Systems: An 11 point review of systems was completed    Constitutional: negative fever, negative chills  Gastrointestinal: negative abdominal pain, negative RUQ pain, negative N/V/D, negative constipation  Genitourinary: negative dysuria, negative vaginal discharge  Behavior/Psych: negative depression, negative anxiety    Patient Active Problem List   Diagnosis    Family history of sickle cell trait (Pt negative)    Normocytic anemia due to blood loss    S/P primary low transverse         Gynecological History:   Age of Menarche: unsure  Age of Menopause: N/A    Sexually Active: single partner, contraception - IUD  STD History: no past history    Pap History: Last PAP was normal;3/2020  Colposcopy History: denies        Health Maintenance Due:      Health Maintenance Due   Topic Date Due    COVID-19 Vaccine (1) Never done       Immunization status: Patient reports she is up-to-date on Gardisil immunization not found in our immunization records       Obstetrical History:     OB History    Para Term  AB Living   1 1 1 0 0 1   SAB IAB Ectopic Molar Multiple Live Births   0 0 0 0 0 1      # Outcome Date GA Lbr Jonh/2nd Weight Sex Delivery Anes PTL Lv   1 Term 20 37w2d  5 lb 14 oz (2.665 kg) F CS-LTranv EPI, Spinal N MARGOT      Complications: Fetal Intolerance      Name: Ángel Minor: 1  Apgar5: 9       Past Medical History:    has a past medical history of Depression, Gestational hypertension (G1), History of blood transfusion, Hx of blood transfusion (2018), Menometrorrhagia, Symptomatic anemia, and Vision abnormalities. Past Surgical History:    has a past surgical history that includes Upper gastrointestinal endoscopy (2018); Upper gastrointestinal endoscopy (N/A, 2018); Colonoscopy (2018); pr colon ca scrn not hi rsk ind (N/A, 2018); and  section (N/A, 2020). Allergies:    is allergic to motrin [ibuprofen].     Medications: Prior to Admission medications    Medication Sig Start Date End Date Taking? Authorizing Provider   acetaminophen (TYLENOL) 325 MG tablet Take 2 tablets by mouth every 6 hours as needed for Pain 2/26/22  Yes NIEVES Faith CNP   vitamin D3 (CHOLECALCIFEROL) 25 MCG (1000 UT) TABS tablet Take 1 tablet by mouth daily 2/18/22  Yes Demario Kaye MD   topiramate (TOPAMAX) 25 MG tablet Take 1 tablet by mouth nightly 2/18/22  Yes Demario Kaye MD   SUMAtriptan (IMITREX) 50 MG tablet Take 1 tablet by mouth once as needed for Migraine 2/18/22 2/18/22  Demario Kaye MD       Social history:    Reviewed and no change from previous visit   reports that she has never smoked. She has never used smokeless tobacco. She reports that she does not drink alcohol and does not use drugs. Family History:    Reviewed and no change from previous visit  Family History of Breast, Ovarian, Colon or Uterine Cancer: No   family history includes No Known Problems in her brother, father, maternal grandfather, mother, paternal grandfather, paternal grandmother, and sister; Other in her maternal grandmother. Physical Exam:    Chaperone for Intimate Exam: Chaperone was present for entire exam (Ayde, RMA)    General Appearance: Appears healthy. Alert; in no acute distress. Pleasant. Breast: normal appearance, no masses or tenderness,   Self breast exams were reviewed in detail. Literature was given. Clinical staff offered to be present for exam: yes     Physical Exam  Abdominal:      Hernia: There is no hernia in the left inguinal area or right inguinal area. Genitourinary:     Exam position: Lithotomy position. Pubic Area: No rash or pubic lice. Labia:         Right: No rash, tenderness or lesion. Left: No rash, tenderness or lesion. Urethra: No prolapse, urethral pain, urethral swelling or urethral lesion. Lymphadenopathy:      Lower Body: No left inguinal adenopathy.        PROCEDURE:  Pt was consented for IUD removal. She was already on the exam table in the dorsal lithotomy position. Speculum was inserted and cervix visualized. IUD strings both visualized. They were grasped with ring forceps and removed with gentle traction. It was noted to be intact. The patient declined to see it. Laboratory data:    No results found for this visit on 22. No results found. ASSESSMENT AND PLAN:    Feliciano Castañeda is a 31 y.o. female Vandana Jacob was seen today for iud removal.    Diagnoses and all orders for this visit:    Well woman exam  -     Chlamydia Trachomatis & Neisseria gonorrhoeae (GC) by amplified detection; Future  -     Vaginitis DNA Probe; Future    Encounter for IUD removal  Mirena IUD was removed today as per patient's request due to recurrent BV and yeast infections. IUD string was visualized and successfully removed. Patient tolerated the removal of IUD    Family planning counseling  Patient has history of migraines with aura. Removed Mirena IUD today. Patient previously was on Depo-Provera however, did not tolerate due to worsening headaches. Discussed with patient progesterone only oral or nexplanon implant. Patient at this time would like to use condoms only and is aware of high chance of pregnancy. Patient given reading material.  Patient will like to think about her options before making a decision. Patient will call office once she would like to start birth control. Patient Active Problem List    Diagnosis Date Noted    S/P primary low transverse      Normocytic anemia due to blood loss 2020     Overview Note:     Hgb 9.4 ()      Family history of sickle cell trait (Pt negative) 2020     Overview Note:     Maunt          Follow Up:    Return in about 1 year (around 2023), or annual.     Counseling:     · discussed birth control and barrier recommendations. · discussed STD counseling and prevention.   · done Gardisil counseling for all patients 10-35 yo. · Routine health maintenance per patients PCP discussed.     Layla Waters MD  Family Medicine Resident   5/6/2022, 10:24 AM

## 2022-05-07 LAB
CANDIDA SPECIES, DNA PROBE: NEGATIVE
GARDNERELLA VAGINALIS, DNA PROBE: POSITIVE
SOURCE: ABNORMAL
TRICHOMONAS VAGINALIS DNA: NEGATIVE

## 2022-05-09 LAB
C TRACH DNA GENITAL QL NAA+PROBE: ABNORMAL
N. GONORRHOEAE DNA: NEGATIVE
SPECIMEN DESCRIPTION: ABNORMAL

## 2022-05-10 DIAGNOSIS — A74.9 CHLAMYDIA: Primary | ICD-10-CM

## 2022-05-10 RX ORDER — DOXYCYCLINE HYCLATE 100 MG
100 TABLET ORAL 2 TIMES DAILY
Qty: 14 TABLET | Refills: 0 | Status: SHIPPED | OUTPATIENT
Start: 2022-05-10 | End: 2022-05-17

## 2022-06-09 ENCOUNTER — TELEPHONE (OUTPATIENT)
Dept: OBGYN | Age: 27
End: 2022-06-09

## 2022-06-09 RX ORDER — NORGESTIMATE AND ETHINYL ESTRADIOL 0.25-0.035
1 KIT ORAL DAILY
Qty: 28 TABLET | Refills: 11 | Status: SHIPPED | OUTPATIENT
Start: 2022-06-09

## 2022-06-09 NOTE — TELEPHONE ENCOUNTER
Norm Veronica called stating that she was told at her annual appointment when her IUD was removed to call the office when she wanted her birth control filled. She would like birth control pills sent to her pharmacy.

## 2022-08-02 ENCOUNTER — HOSPITAL ENCOUNTER (OUTPATIENT)
Age: 27
Setting detail: SPECIMEN
Discharge: HOME | End: 2022-08-02

## 2022-08-02 ENCOUNTER — OFFICE VISIT (OUTPATIENT)
Dept: INTERNAL MEDICINE | Age: 27
End: 2022-08-02
Payer: COMMERCIAL

## 2022-08-02 VITALS
OXYGEN SATURATION: 99 % | BODY MASS INDEX: 30.73 KG/M2 | SYSTOLIC BLOOD PRESSURE: 121 MMHG | HEIGHT: 64 IN | HEART RATE: 75 BPM | TEMPERATURE: 97.2 F | WEIGHT: 180 LBS | DIASTOLIC BLOOD PRESSURE: 84 MMHG

## 2022-08-02 DIAGNOSIS — R22.41 LOCALIZED SWELLING OF RIGHT FOOT: Primary | ICD-10-CM

## 2022-08-02 DIAGNOSIS — R63.5 EXCESSIVE WEIGHT GAIN: ICD-10-CM

## 2022-08-02 DIAGNOSIS — Z13.21 ENCOUNTER FOR VITAMIN DEFICIENCY SCREENING: ICD-10-CM

## 2022-08-02 DIAGNOSIS — Z13.1 SCREENING FOR DIABETES MELLITUS: ICD-10-CM

## 2022-08-02 DIAGNOSIS — E66.09 CLASS 1 OBESITY DUE TO EXCESS CALORIES WITH SERIOUS COMORBIDITY AND BODY MASS INDEX (BMI) OF 30.0 TO 30.9 IN ADULT: ICD-10-CM

## 2022-08-02 DIAGNOSIS — Z13.220 SCREENING FOR LIPID DISORDERS: ICD-10-CM

## 2022-08-02 DIAGNOSIS — Z13.29 THYROID DISORDER SCREENING: ICD-10-CM

## 2022-08-02 DIAGNOSIS — R53.83 FATIGUE, UNSPECIFIED TYPE: ICD-10-CM

## 2022-08-02 DIAGNOSIS — Z13.220 SCREENING FOR HYPERLIPIDEMIA: ICD-10-CM

## 2022-08-02 DIAGNOSIS — F33.1 MODERATE EPISODE OF RECURRENT MAJOR DEPRESSIVE DISORDER (HCC): ICD-10-CM

## 2022-08-02 LAB
CHOLESTEROL, FASTING: 208 MG/DL
CHOLESTEROL/HDL RATIO: 3.5
ESTIMATED AVERAGE GLUCOSE: 103 MG/DL
HBA1C MFR BLD: 5.2 % (ref 4–6)
HDLC SERPL-MCNC: 59 MG/DL
LDL CHOLESTEROL: 137 MG/DL (ref 0–130)
TRIGLYCERIDE, FASTING: 60 MG/DL
TSH SERPL DL<=0.05 MIU/L-ACNC: 1.17 UIU/ML (ref 0.3–5)
VITAMIN D 25-HYDROXY: 19.4 NG/ML

## 2022-08-02 PROCEDURE — 99213 OFFICE O/P EST LOW 20 MIN: CPT

## 2022-08-02 PROCEDURE — G8427 DOCREV CUR MEDS BY ELIG CLIN: HCPCS

## 2022-08-02 PROCEDURE — 99211 OFF/OP EST MAY X REQ PHY/QHP: CPT | Performed by: INTERNAL MEDICINE

## 2022-08-02 PROCEDURE — 1036F TOBACCO NON-USER: CPT

## 2022-08-02 PROCEDURE — G8417 CALC BMI ABV UP PARAM F/U: HCPCS

## 2022-08-02 SDOH — ECONOMIC STABILITY: TRANSPORTATION INSECURITY
IN THE PAST 12 MONTHS, HAS LACK OF TRANSPORTATION KEPT YOU FROM MEETINGS, WORK, OR FROM GETTING THINGS NEEDED FOR DAILY LIVING?: NO

## 2022-08-02 SDOH — ECONOMIC STABILITY: FOOD INSECURITY: WITHIN THE PAST 12 MONTHS, THE FOOD YOU BOUGHT JUST DIDN'T LAST AND YOU DIDN'T HAVE MONEY TO GET MORE.: NEVER TRUE

## 2022-08-02 SDOH — ECONOMIC STABILITY: TRANSPORTATION INSECURITY
IN THE PAST 12 MONTHS, HAS THE LACK OF TRANSPORTATION KEPT YOU FROM MEDICAL APPOINTMENTS OR FROM GETTING MEDICATIONS?: NO

## 2022-08-02 SDOH — ECONOMIC STABILITY: FOOD INSECURITY: WITHIN THE PAST 12 MONTHS, YOU WORRIED THAT YOUR FOOD WOULD RUN OUT BEFORE YOU GOT MONEY TO BUY MORE.: NEVER TRUE

## 2022-08-02 ASSESSMENT — ENCOUNTER SYMPTOMS
DIARRHEA: 0
RHINORRHEA: 0
NAUSEA: 0
CONSTIPATION: 0
BLOOD IN STOOL: 0
COUGH: 0
EYE REDNESS: 0
SORE THROAT: 0
BACK PAIN: 0
SHORTNESS OF BREATH: 0
WHEEZING: 0
EYE DISCHARGE: 0
VOMITING: 0
ABDOMINAL PAIN: 0
PHOTOPHOBIA: 0

## 2022-08-02 ASSESSMENT — PATIENT HEALTH QUESTIONNAIRE - PHQ9
5. POOR APPETITE OR OVEREATING: 0
2. FEELING DOWN, DEPRESSED OR HOPELESS: 1
6. FEELING BAD ABOUT YOURSELF - OR THAT YOU ARE A FAILURE OR HAVE LET YOURSELF OR YOUR FAMILY DOWN: 1
1. LITTLE INTEREST OR PLEASURE IN DOING THINGS: 1
4. FEELING TIRED OR HAVING LITTLE ENERGY: 1
7. TROUBLE CONCENTRATING ON THINGS, SUCH AS READING THE NEWSPAPER OR WATCHING TELEVISION: 1
9. THOUGHTS THAT YOU WOULD BE BETTER OFF DEAD, OR OF HURTING YOURSELF: 0
SUM OF ALL RESPONSES TO PHQ QUESTIONS 1-9: 6
SUM OF ALL RESPONSES TO PHQ QUESTIONS 1-9: 6
3. TROUBLE FALLING OR STAYING ASLEEP: 1
SUM OF ALL RESPONSES TO PHQ QUESTIONS 1-9: 6
10. IF YOU CHECKED OFF ANY PROBLEMS, HOW DIFFICULT HAVE THESE PROBLEMS MADE IT FOR YOU TO DO YOUR WORK, TAKE CARE OF THINGS AT HOME, OR GET ALONG WITH OTHER PEOPLE: 1
SUM OF ALL RESPONSES TO PHQ QUESTIONS 1-9: 6
8. MOVING OR SPEAKING SO SLOWLY THAT OTHER PEOPLE COULD HAVE NOTICED. OR THE OPPOSITE, BEING SO FIGETY OR RESTLESS THAT YOU HAVE BEEN MOVING AROUND A LOT MORE THAN USUAL: 0
SUM OF ALL RESPONSES TO PHQ9 QUESTIONS 1 & 2: 2

## 2022-08-02 ASSESSMENT — SOCIAL DETERMINANTS OF HEALTH (SDOH): HOW HARD IS IT FOR YOU TO PAY FOR THE VERY BASICS LIKE FOOD, HOUSING, MEDICAL CARE, AND HEATING?: NOT HARD AT ALL

## 2022-08-02 NOTE — PROGRESS NOTES
Internal Medicine Clinic New Patient Note    Date of patient's visit: 8/2/2022  Name:  Oscar Allen  Primary Care Physician: Sue Galvin MD    Reason for visit: First Visit, establish care     HISTORY OF PRESENTING ILLNESS:    History was obtained from the patient. Oscar Allen is a 31 y.o. is here to establish care. Former PCP is Dr. Citlaly Williamson. Last seen in 2/18/2022. Patient comes into the office with a chief complaint of swelling of her right foot that she experiences at the end of the day which has started a couple of weeks ago. 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 It is associated with mild tenderness at night. She she denies erythema, warmth, torturous veins, fever or chills. Patient has a history of migraine headaches for which she is using topiramate and sumatriptan as needed. Patient also has a history of bacterial vaginosis and has been treated for the same with metronidazole. Currently denies vaginal discharge. HTN: She has a history of hypertension during pregnancy and is currently resolved. TSH pending. Patient is obese with BMI 30. Depression: PHQ-9 score 6. Patient has a history of major depressive disorder for which she used paroxetine 20 for a month. Although she felt anxious, tremulous with paroxetine and had decreased appetite so she stopped taking paroxetine after that. Currently she is not on any medications. A referral to psychiatry has been placed. Patient has a history of vitamin D deficiency for which vitamin D 1000 units has been prescribed but she is not taking it. Order for vitamin D 25 level has been pended. Patient has a history of iron deficiency anemia, currently denies symptoms of pica, fatigue and restless leg syndrome. Health maintenance:  Due for COVID vaccine second dose-patient states she will get it at her work.   Pap smear negative on 3/2020, follows with OB/GYN. IUD has been removed recently. Patient denies smoking, drinking alcohol or using illicit drugs. PAST MEDICAL HISTORY:          Diagnosis Date    Depression 2021    Gestational hypertension (G1) 2020    History of blood transfusion 2018    From Motrin use     Hx of blood transfusion (2018) 2020    Due to GI bleed secondary to Motrin use    Menometrorrhagia 05/10/2018    Symptomatic anemia 2018    From Motrin use     Vision abnormalities        PAST SURGICAL HISTORY:          Procedure Laterality Date     SECTION N/A 2020     [facility]    COLONOSCOPY  2018    No lesions seen    DE COLON CA SCRN NOT HI RSK IND N/A 2018    COLONOSCOPY performed by Man Gonzales MD at [address] [place] [address]  2018    No lesions seen up to the 2nd part of the duodenum. UPPER GASTROINTESTINAL ENDOSCOPY N/A 2018    EGD DIAGNOSTIC ONLY performed by Man Gonzales MD at [address] [place] [address]:      Allergies   Allergen Reactions    Motrin [Ibuprofen] Other (See Comments)     Pt experienced internal bleeding due this medication.  Pt reports she \" took too much\"  Has tolerated this medication in  the past.          HOME MEDICATION:      Current Outpatient Medications on File Prior to Visit   Medication Sig Dispense Refill    norgestimate-ethinyl estradiol ([address]) 0.25-35 MG-MCG per tablet Take 1 tablet by mouth daily 28 tablet 11    acetaminophen (TYLENOL) 325 MG tablet Take 2 tablets by mouth every 6 hours as needed for Pain 30 tablet 0    topiramate (TOPAMAX) 25 MG tablet Take 1 tablet by mouth nightly 30 tablet 3    SUMAtriptan (IMITREX) 50 MG tablet Take 1 tablet by mouth once as needed for Migraine 27 tablet 1    vitamin D3 (CHOLECALCIFEROL) 25 MCG (1000 UT) TABS tablet Take 1 tablet by mouth daily (Patient not taking: Reported on 2022) 90 tablet 1     Current Facility-Administered Medications on File Prior to Visit   Medication Dose Route Frequency Provider Last Rate Last Admin    levonorgestrel (MIRENA) IUD 52 mg 1 each  1 each IntraUTERine Once Aguilar Fitting, DO   1 each at 10/21/20 1418       SOCIAL HISTORY:    TOBACCO:   reports that she has never smoked. She has never used smokeless tobacco.  ETOH:   reports no history of alcohol use. DRUGS:  reports no history of drug use. OCCUPATION:      FAMILY HISTORY:          Problem Relation Age of Onset    No Known Problems Mother     No Known Problems Father     Other Maternal Grandmother         diverticulitis    No Known Problems Paternal Grandfather     No Known Problems Paternal Grandmother     No Known Problems Maternal Grandfather     No Known Problems Brother     No Known Problems Sister     Breast Cancer Neg Hx     Cancer Neg Hx     Colon Cancer Neg Hx     Diabetes Neg Hx     Eclampsia Neg Hx     Hypertension Neg Hx     Ovarian Cancer Neg Hx      Labor Neg Hx     Spont Abortions Neg Hx     Stroke Neg Hx        REVIEW OF SYSTEMS:    Review of Systems   Constitutional:  Negative for appetite change, chills, fatigue, fever and unexpected weight change. HENT:  Negative for congestion, rhinorrhea, sneezing and sore throat. Eyes:  Negative for photophobia, discharge, redness and visual disturbance. Respiratory:  Negative for cough, shortness of breath and wheezing. Cardiovascular:  Negative for chest pain, palpitations and leg swelling. Gastrointestinal:  Negative for abdominal pain, blood in stool, constipation, diarrhea, nausea and vomiting. Endocrine: Negative for cold intolerance and heat intolerance. Genitourinary:  Negative for dysuria, frequency, hematuria and urgency. Musculoskeletal:  Negative for arthralgias, back pain and neck pain. Right foot swelling at night. Skin:  Negative for pallor, rash and wound.    Allergic/Immunologic: Negative for environmental allergies and food allergies. Neurological:  Negative for dizziness, seizures, weakness, light-headedness, numbness and headaches. Hematological:  Negative for adenopathy. Does not bruise/bleed easily. Psychiatric/Behavioral:  Negative for agitation, confusion, hallucinations and sleep disturbance. The patient is not nervous/anxious. PHYSICAL EXAM:      Vitals:    08/02/22 0901   BP: 121/84   Pulse: 75   Temp: 97.2 °F (36.2 °C)   SpO2: 99%      Physician Exam:     General Examination    General Resting comfortably in bed   Head Normocephalic, without obvious abnormality   Neck Supple, symmetrical. Good ROM. No midline or paraspinal tenderness. Lungs Respirations unlabored, no wheezing   Chest Wall No deformity   Heart RRR, no murmur   Abdomen Soft. Non-tender, non-distended   Extremities No cyanosis or edema or warmth. Pulses 2+ and symmetric   Skin: Skin  turgor normal, no rashes or lesions   No pitting edema noted. LABORATORY FINDINGS:    CBC:   Lab Results   Component Value Date/Time    WBC 3.8 06/01/2021 10:15 AM    HGB 10.7 06/01/2021 10:15 AM     06/01/2021 10:15 AM     BMP:    Lab Results   Component Value Date/Time     06/01/2021 10:15 AM    K 3.7 06/01/2021 10:15 AM     06/01/2021 10:15 AM    CO2 21 06/01/2021 10:15 AM    BUN 12 06/01/2021 10:15 AM    CREATININE 0.58 06/01/2021 10:15 AM    GLUCOSE 101 06/01/2021 10:15 AM     Hemoglobin A1C: No results found for: LABA1C  Lipid profile: No results found for: CHOL, TRIG, HDL, LDL  Thyroid functions: No results found for: T4, TSH   Hepatic functions:   Lab Results   Component Value Date/Time    ALT <5 10/29/2020 05:58 AM    AST 7 10/29/2020 05:58 AM    PROT 5.9 10/29/2020 05:58 AM    BILITOT 0.16 10/29/2020 05:58 AM    BILIDIR <0.08 08/13/2018 08:26 PM    LABALBU 3.1 10/29/2020 05:58 AM       Any additional findings:    Assessment and Plan:   1.  Localized swelling of right foot  Swelling likely due to stent pressure during driving and unlikely due to any underlying disease. Patient has been counseled on lifestyle changes, weight loss, diet, exercise and has been asked to reduce salt intake and wear compression stockings. 2. Moderate episode of recurrent major depressive disorder ([address]  - External Referral To Psychiatry  -Has used paroxetine 20 mg in the past with extensive side effects of anxiety, tremulousness and loss of appetite. Patient has stopped using paroxetine after that. 3. Encounter for vitamin deficiency screening  - Vitamin D 25 Hydroxy; Future    4. Screening for diabetes mellitus  - Hemoglobin A1C; Future      Follow-up:   Follow-up in 4 weeks. Mary Jacobo received counseling on the following healthy behaviors: medication compliance, diet and exercise    Patient given educational materials. Was a self-tracking handout given in paper form or via [employer]? Yes    Discussed use, benefit, and side effects of prescribed medications. Barriers to medication compliance addressed. All patient questions answered. Pt voiced understanding.      Roney Morris MD  Internal Medicine Resident  [facility]  8/2/2022 11:24 AM

## 2022-08-02 NOTE — PROGRESS NOTES
Attending Physician Statement  I have discussed the care of [address], including pertinent history and exam findings with the resident. I have reviewed the key elements of all parts of the encounter with the resident. I agree with the assessment, and status of the problem list as documented. Diagnosis Orders   1. Localized swelling of right foot        2. Moderate episode of recurrent major depressive disorder [facility])  External Referral To Psychiatry      3. Encounter for vitamin deficiency screening  Vitamin D 25 Hydroxy      4. Screening for hyperlipidemia        5. Thyroid disorder screening        6. Screening for diabetes mellitus  Hemoglobin A1C      Swollen feet likely aren't due to underlying disease. She is asked to make some lifestyle changes, reduce salt intake, wear compression stockings, lose weight. The plan and orders should include   Orders Placed This Encounter   Procedures    Vitamin D 25 Hydroxy    Hemoglobin A1C    External Referral To Psychiatry    and this was also documented by the resident. I agree with the referral to Psych. The medication list was reviewed with the resident and is up to date. The return visit should be in 1 month .     Dr Juno Shahid MD, [facility]  Associate , Department of Internal Medicine  Resident Ambulatory Site Medical Director  [facility] Internal Medicine  [employer]  Internal Medicine Clerkship - Claudetta Artis    8/2/2022, 9:52 AM

## 2022-10-12 ENCOUNTER — OFFICE VISIT (OUTPATIENT)
Dept: INTERNAL MEDICINE | Age: 27
End: 2022-10-12
Payer: COMMERCIAL

## 2022-10-12 VITALS
WEIGHT: 181 LBS | DIASTOLIC BLOOD PRESSURE: 81 MMHG | SYSTOLIC BLOOD PRESSURE: 126 MMHG | BODY MASS INDEX: 31.07 KG/M2 | HEART RATE: 100 BPM

## 2022-10-12 DIAGNOSIS — Z83.2 FAMILY HISTORY OF SICKLE CELL TRAIT: ICD-10-CM

## 2022-10-12 DIAGNOSIS — M79.89 SWELLING OF BOTH LOWER EXTREMITIES: Primary | ICD-10-CM

## 2022-10-12 DIAGNOSIS — E55.9 VITAMIN D DEFICIENCY: ICD-10-CM

## 2022-10-12 DIAGNOSIS — F33.1 MODERATE EPISODE OF RECURRENT MAJOR DEPRESSIVE DISORDER (HCC): ICD-10-CM

## 2022-10-12 DIAGNOSIS — E78.5 HYPERLIPIDEMIA, UNSPECIFIED HYPERLIPIDEMIA TYPE: ICD-10-CM

## 2022-10-12 DIAGNOSIS — G43.109 MIGRAINE WITH AURA AND WITHOUT STATUS MIGRAINOSUS, NOT INTRACTABLE: ICD-10-CM

## 2022-10-12 PROCEDURE — 1036F TOBACCO NON-USER: CPT

## 2022-10-12 PROCEDURE — 99211 OFF/OP EST MAY X REQ PHY/QHP: CPT | Performed by: INTERNAL MEDICINE

## 2022-10-12 PROCEDURE — G8484 FLU IMMUNIZE NO ADMIN: HCPCS

## 2022-10-12 PROCEDURE — G8427 DOCREV CUR MEDS BY ELIG CLIN: HCPCS

## 2022-10-12 PROCEDURE — G8417 CALC BMI ABV UP PARAM F/U: HCPCS

## 2022-10-12 PROCEDURE — 99213 OFFICE O/P EST LOW 20 MIN: CPT

## 2022-10-12 RX ORDER — MELATONIN
1000 DAILY
Qty: 90 TABLET | Refills: 1 | Status: SHIPPED | OUTPATIENT
Start: 2022-10-12

## 2022-10-12 ASSESSMENT — ENCOUNTER SYMPTOMS
COUGH: 0
APNEA: 0
ABDOMINAL PAIN: 0
COLOR CHANGE: 0
CHOKING: 0
EYE REDNESS: 0
SHORTNESS OF BREATH: 0
RHINORRHEA: 0
ABDOMINAL DISTENTION: 0

## 2022-10-12 NOTE — PROGRESS NOTES
[facility] [address] [address]  Dept: [phone]  Dept Fax: [phone]    Office Progress/Follow Up Note  Date ofpatient's visit: 10/12/2022  Patient's Name:  Carolina Coats YOB: 1995            Patient Care Team:  Simi Sun MD as PCP - General (Internal Medicine)  Dorothea Hunter MD as Obstetrician (Perinatology)  ================================================================    REASON FOR VISIT/CHIEF COMPLAINT:  Established New Doctor (Transfer from [facility]--needed afternoon appt. ), Edema (1 month f/u on L ankle swelling, states that it has got better but still has some swelling ), and Health Maintenance (Will get flu vaccine at work )    HISTORY OF PRESENTING ILLNESS:  History was obtained from: patient. Rob Osullivan a 31 y.o. is here for a follow-up for lower extremity swelling. Patient had bilateral ankle swelling that started after pregnancy 2 years back. Worsening on night, improved swelling in the morning. Previously patient's work was requiring her to stand up for a longer time but she has changed her job 2 months back and swelling has gotten better in the past 2 months. Denies using compression stockings. No pain or skin changes in the lower extremities. Depression-on the last visit patient's PHQ score was 6 and she was referred to psychiatrist.  Patient has not scheduled an appointment as she felt it to wont help. Migraine-patient has a history of migraine-takes Topamax 25 daily and sumatriptan 50 mg as needed. Patient otherwise feels fine denies any acute complaints. No headache, fever, shortness of breath, chest pain, leg pain, abdominal pain, diarrhea or burning micturition.       Patient Active Problem List   Diagnosis    Family history of sickle cell trait (Pt negative)    Normocytic anemia due to blood loss    S/P primary low transverse     Localized swelling of right foot Health Maintenance Due   Topic Date Due    COVID-19 Vaccine (2 - Booster for Rnoaldo series) 2022    Flu vaccine (1) 2022       Allergies   Allergen Reactions    Motrin [Ibuprofen] Other (See Comments)     Pt experienced internal bleeding due this medication.  Pt reports she \" took too much\"  Has tolerated this medication in  the past.          Current Outpatient Medications   Medication Sig Dispense Refill    norgestimate-ethinyl estradiol (MONO-LINYAH) 0.25-35 MG-MCG per tablet Take 1 tablet by mouth daily 28 tablet 11    acetaminophen (TYLENOL) 325 MG tablet Take 2 tablets by mouth every 6 hours as needed for Pain 30 tablet 0    vitamin D3 (CHOLECALCIFEROL) 25 MCG (1000 UT) TABS tablet Take 1 tablet by mouth daily 90 tablet 1    topiramate (TOPAMAX) 25 MG tablet Take 1 tablet by mouth nightly 30 tablet 3    SUMAtriptan (IMITREX) 50 MG tablet Take 1 tablet by mouth once as needed for Migraine 27 tablet 1     Current Facility-Administered Medications   Medication Dose Route Frequency Provider Last Rate Last Admin    levonorgestrel (MIRENA) IUD 52 mg 1 each  1 each IntraUTERine Once Ayesha Games, DO   1 each at 10/21/20 1418       Social History     Tobacco Use    Smoking status: Never    Smokeless tobacco: Never   Vaping Use    Vaping Use: Never used   Substance Use Topics    Alcohol use: No    Drug use: No       Family History   Problem Relation Age of Onset    No Known Problems Mother     No Known Problems Father     Other Maternal Grandmother         diverticulitis    No Known Problems Paternal Grandfather     No Known Problems Paternal Grandmother     No Known Problems Maternal Grandfather     No Known Problems Brother     No Known Problems Sister     Breast Cancer Neg Hx     Cancer Neg Hx     Colon Cancer Neg Hx     Diabetes Neg Hx     Eclampsia Neg Hx     Hypertension Neg Hx     Ovarian Cancer Neg Hx      Labor Neg Hx     Spont Abortions Neg Hx     Stroke Neg Hx         REVIEW OF SYSTEMS:  Review of Systems   Constitutional:  Negative for activity change and chills. HENT:  Negative for congestion and rhinorrhea. Eyes:  Negative for redness. Respiratory:  Negative for apnea, cough, choking and shortness of breath. Cardiovascular:  Positive for leg swelling. Negative for chest pain and palpitations (b/l ankle usually nightly). Gastrointestinal:  Negative for abdominal distention and abdominal pain. Genitourinary:  Negative for difficulty urinating, flank pain, frequency and urgency. Musculoskeletal:  Negative for arthralgias and joint swelling. Skin:  Negative for color change and pallor. Neurological:  Negative for dizziness and headaches. Hematological:  Negative for adenopathy. Psychiatric/Behavioral:  Positive for decreased concentration, dysphoric mood and sleep disturbance. Negative for agitation, confusion, hallucinations, self-injury and suicidal ideas. The patient is nervous/anxious. The patient is not hyperactive. PHYSICAL EXAM:  Vitals:    10/12/22 1457   BP: 126/81   Site: Left Upper Arm   Position: Sitting   Cuff Size: Medium Adult   Pulse: 100   Weight: 181 lb (82.1 kg)     BP Readings from Last 3 Encounters:   10/12/22 126/81   08/02/22 121/84   05/06/22 124/85        Physical Exam  Constitutional:       General: She is not in acute distress. Appearance: Normal appearance. She is not ill-appearing. HENT:      Right Ear: External ear normal.      Left Ear: External ear normal.      Nose: Nose normal.   Eyes:      Pupils: Pupils are equal, round, and reactive to light. Cardiovascular:      Rate and Rhythm: Normal rate and regular rhythm. Pulses: Normal pulses. Pulmonary:      Effort: Pulmonary effort is normal.      Breath sounds: No wheezing, rhonchi or rales. Abdominal:      General: Abdomen is flat. Palpations: Abdomen is soft. Tenderness: There is no abdominal tenderness. Hernia: No hernia is present. Musculoskeletal:         General: No swelling or tenderness. Cervical back: Normal range of motion. Right lower leg: No edema. Left lower leg: No edema. Skin:     Findings: No erythema or lesion. Neurological:      General: No focal deficit present. Mental Status: She is alert and oriented to person, place, and time. Psychiatric:         Attention and Perception: Attention and perception normal.         Mood and Affect: Affect normal. Mood is depressed. Speech: Speech normal.         Behavior: Behavior normal.         Thought Content: Thought content normal.         Cognition and Memory: Cognition and memory normal.         Judgment: Judgment normal.         DIAGNOSTIC FINDINGS:  CBC:  Lab Results   Component Value Date/Time    WBC 3.8 06/01/2021 10:15 AM    HGB 10.7 06/01/2021 10:15 AM     06/01/2021 10:15 AM       BMP:    Lab Results   Component Value Date/Time     06/01/2021 10:15 AM    K 3.7 06/01/2021 10:15 AM     06/01/2021 10:15 AM    CO2 21 06/01/2021 10:15 AM    BUN 12 06/01/2021 10:15 AM    CREATININE 0.58 06/01/2021 10:15 AM    GLUCOSE 101 06/01/2021 10:15 AM       HEMOGLOBIN A1C:   Lab Results   Component Value Date/Time    LABA1C 5.2 08/02/2022 10:10 AM       FASTING LIPID PANEL:  Lab Results   Component Value Date    HDL 59 08/02/2022       ASSESSMENT AND PLAN:  Chai Raman was seen today for established new doctor, edema and health maintenance. Diagnoses and all orders for this visit:    Swelling of both lower extremities  -Swelling has improved  -Encouraged to use compression stockings  -Encouraged to elevate the legs when sleeping    Vitamin D deficiency  -Oral vitamin D will be prescribed    Moderate episode of recurrent major depressive disorder (HCC)  PHQ-9 on last visit was 6. PHQ-9 today is 18. As per patient her mood has been stable for past 2 years-no acute change recently.   Patient agreed to see a psychiatrist and will make an appointment after going home. Last psychiatrist visit was last year.  -Not suicidal or homicidal    Hyperlipidemia  LDL 2 months back was 138, total cholesterol 208. Eating mostly chicken and meat. Eats salty snacks. Encouraged to have at least 2 to 3 cups of vegetables and fruits per day. Encouraged about overall dietary intake. Encouraged to have at least 30 minutes of aerobic exercise 4 days per week. Migraine with aura and without status migrainosus, not intractable  -Continue taking Topamax 25 mg nightly and sumatriptan 50 mg as needed    Family history of sickle cell trait (Pt negative)          FOLLOW UP AND INSTRUCTIONS:  No follow-ups on file. Kev Albert received counseling on the following healthy behaviors: nutrition and exercise    Discussed use, benefit, and side effects of prescribed medications. Barriers to medication compliance addressed. All patient questions answered. Pt voiced understanding. Patient given educational materials - see patient instructions    Maria Fernanda Galvan MD  Internal Medicine Resident, PGY- [address], [place]  10/12/2022, 3:24 PM      This note is created with the assistance of a speech-recognition program. While intending to generate a document that actually reflects the content of thevisit, the document can still have some mistakes which may not have been identified and corrected by editing.

## 2022-10-12 NOTE — PROGRESS NOTES
Attending Physician Statement  I have discussed the care of [address] including pertinent history and exam findings,  with the resident. I have reviewed the key elements of all parts of the encounter with the resident. I agree with the assessment, plan and orders as documented by the resident.   (GE Modifier)    MD GUICHO Maki  Attending Physician, [address] [place] [address], Internal Medicine Residency Program  [address] [place] [address]  10/12/2022, 3:45 PM

## 2022-12-15 ENCOUNTER — OFFICE VISIT (OUTPATIENT)
Dept: OBGYN | Age: 27
End: 2022-12-15
Payer: COMMERCIAL

## 2022-12-15 ENCOUNTER — HOSPITAL ENCOUNTER (OUTPATIENT)
Age: 27
Setting detail: SPECIMEN
Discharge: HOME | End: 2022-12-15

## 2022-12-15 VITALS — DIASTOLIC BLOOD PRESSURE: 79 MMHG | SYSTOLIC BLOOD PRESSURE: 122 MMHG | HEART RATE: 87 BPM

## 2022-12-15 DIAGNOSIS — Z72.53 HIGH RISK BISEXUAL BEHAVIOR: Primary | ICD-10-CM

## 2022-12-15 DIAGNOSIS — Z72.53 HIGH RISK BISEXUAL BEHAVIOR: ICD-10-CM

## 2022-12-15 LAB
HAV IGM SER IA-ACNC: NONREACTIVE
HEPATITIS B CORE IGM ANTIBODY: NONREACTIVE
HEPATITIS B SURFACE ANTIGEN: NONREACTIVE
HEPATITIS C ANTIBODY: NONREACTIVE
HIV AG/AB: NONREACTIVE

## 2022-12-15 PROCEDURE — 99211 OFF/OP EST MAY X REQ PHY/QHP: CPT | Performed by: STUDENT IN AN ORGANIZED HEALTH CARE EDUCATION/TRAINING PROGRAM

## 2022-12-15 RX ORDER — METRONIDAZOLE 7.5 MG/G
GEL TOPICAL
Qty: 1 EACH | Refills: 5 | Status: SHIPPED | OUTPATIENT
Start: 2022-12-15

## 2022-12-15 NOTE — PROGRESS NOTES
OB/GYN Problem Visit    Cecil Friday  12/15/2022                       Primary Care Physician: Luis Boss MD    CC:   Chief Complaint   Patient presents with    Follow-up     Keeps getting yeast infections along with BV         HPI: Cecil Friday is a 31 y.o. female     The patient was seen and examined. She is here for STD check and f/u on recurrent BV. Patient states she is sexually active w/ 2 partners. She is bisexual. She reports she has had BV several times this year. Per chart review she has had 4 documented episodes in  and reports there were several other times at Urgent Care. She reports she was just seen and treated at urgent care 2 weeks ago. She does not have current concern for BV or yeast since she was just treated. Discussed w/ patient that she meets criteria for recurrent BV and she is eligible for long term treatment w/ Metrogel. She is interested in starting that today. She report she does not desire pregnancy but is not on birth control and only uses condoms sometimes. She is currently on her period. Discussed consistent use of birth control unless pregnancy is desires. Discussed options and patient states she has tried several and the pill has been the best. She still has her Rx and wants to resume taking. Recommended barrier method x 1 week. Her bowel habits are regular. She denies any bloating. She denies dysuria. She denies urinary leaking. She denies vaginal discharge. She is sexually active with has sex with males and females.      REVIEW OF SYSTEMS:  Constitutional: negative fever, negative chills  HEENT: negative visual disturbances, negative headaches  Respiratory: negative dyspnea, negative cough  Cardiovascular: negative chest pain,  negative palpitations  Gastrointestinal: negative abdominal pain, negative RUQ pain, negative N/V, negative diarrhea, negative constipation  Genitourinary: negative dysuria, negative vaginal discharge  Dermatological: negative rash  Hematologic: negative bruising  Immunologic/Lymphatic: negative recent illness, negative recent sick contact  Musculoskeletal: negative back pain, negative myalgias, negative arthralgias  Neurological:  negative dizziness, negative weakness  Behavior/Psych: negative depression, negative anxiety      OBSTETRICAL HISTORY:  OB History    Para Term  AB Living   1 1 1 0 0 1   SAB IAB Ectopic Molar Multiple Live Births   0 0 0   0 1      # Outcome Date GA Lbr Jonh/2nd Weight Sex Delivery Anes PTL Lv   1 Term 20 37w2d  5 lb 14 oz (2.665 kg) F CS-LTranv EPI, Spinal N MARGOT      Complications: Fetal Intolerance       PAST MEDICAL HISTORY:      Diagnosis Date    Depression 2021    Gestational hypertension (G1) 2020    History of blood transfusion 2018    From Motrin use     Hx of blood transfusion (2018) 2020    Due to GI bleed secondary to Motrin use    Menometrorrhagia 05/10/2018    Symptomatic anemia 2018    From Motrin use     Vision abnormalities        PAST SURGICAL HISTORY:                                                                    Procedure Laterality Date     SECTION N/A 2020     SECTION performed by Darling Cline DO at [facility] L[facility] OR    COLONOSCOPY  2018    No lesions seen    CA COLON CA SCRN NOT HI RSK IND N/A 2018    COLONOSCOPY performed by Georgi Webber MD at [address] [place] [address]  2018    No lesions seen up to the 2nd part of the duodenum.     UPPER GASTROINTESTINAL ENDOSCOPY N/A 2018    EGD DIAGNOSTIC ONLY performed by Georgi Webber MD at [facility]:  Current Outpatient Medications   Medication Sig Dispense Refill    metroNIDAZOLE (METROGEL) 0.75 % gel Apply vaginally twice weekly for 4 months 1 each 5    acetaminophen (TYLENOL) 325 MG tablet Take 2 tablets by mouth every 6 hours as needed for Pain 30 tablet 0    topiramate (TOPAMAX) 25 MG tablet Take 1 tablet by mouth nightly 30 tablet 3    SUMAtriptan (IMITREX) 50 MG tablet Take 1 tablet by mouth once as needed for Migraine 27 tablet 1    vitamin D3 (CHOLECALCIFEROL) 25 MCG (1000 UT) TABS tablet Take 1 tablet by mouth daily (Patient not taking: Reported on 12/15/2022) 90 tablet 1    norgestimate-ethinyl estradiol (MONO-LINYAH) 0.25-35 MG-MCG per tablet Take 1 tablet by mouth daily (Patient not taking: Reported on 12/15/2022) 28 tablet 11     Current Facility-Administered Medications   Medication Dose Route Frequency Provider Last Rate Last Admin    levonorgestrel (MIRENA) IUD 52 mg 1 each  1 each IntraUTERine Once Krystle Mallcedrick, DO   1 each at 10/21/20 1418       ALLERGIES:  Allergies as of 12/15/2022    (No Active Allergies)                                   VITALS:  Vitals:    12/15/22 1540   BP: 122/79   Site: Left Upper Arm   Position: Sitting   Cuff Size: Medium Adult   Pulse: 87                                                                                                                                                                         PHYSICAL EXAM:   General Appearance: Appears healthy. Alert; in no acute distress. Pleasant. Respiratory: clear to auscultation, no wheezes, rales or rhonchi, symmetric air entry  Cardiovascular: normal, regular rate and rhythm  Abdomen: soft, non-tender, non-distended, no right upper quadrant tenderness, and no CVA tenderness  Pelvic Exam:   Declined by patient   Extremities: non-tender BLE and non-edematous  Musculoskeletal: no gross abnormalities  Psych: Normal. and Alert and oriented, appropriate affect. DATA:  No results found for this visit on 12/15/22. ASSESSMENT & PLAN:    Guera Mina is a 31 y.o. female    - VSS   - Doing well w/ no major complaints   - Desires STD screen. HIV, TPAL, Hep panel and GCC urine ordered   - Patient was just treated w/in the last 2 weeks for BV and yeast. Denies sx at this time.  Since she has had BV >3x this year will give suppressive treatment w/ metrogel 2x weekly x 4 months   - F/u in 4 months   - Patient restarting her OCP rx that she had stopped taking. Barrier method x 1 week   - Pap UTD due     Patient Active Problem List    Diagnosis Date Noted    Localized swelling of right foot 2022     Priority: Medium    S/P primary low transverse      Normocytic anemia due to blood loss 2020     Hgb 9.4 ()      Family history of sickle cell trait (Pt negative) 2020     Maunt          Return in about 4 months (around 4/15/2023) for f/u on BV, will be due for pap . Counseling Completed:    Counseled about need for repeat pap as per American Society for Colposcopy and Cervical Pathology guidelines. Counseled about birth control and barrier recommendations. Counseled about STD counseling and prevention. Routine health maintenance per patients PCP discussed. Patient was seen with total face to face time of 15 minutes. More than 50% of this visit was on counseling and education regarding her diagnose(s) as listed below and options. She was also counseled on her preventative health maintenance recommendations and follow-up. Diagnosis Orders   1.  High risk bisexual behavior  Hepatitis Panel, Acute    HIV Screen    T. pallidum Ab    Chlamydia/GC DNA, Urine          Rahel Rasmussen DO  Ob/Gyn Resident  [facility], [facility]  12/15/2022, 4:16 PM

## 2022-12-16 LAB — T. PALLIDUM, IGG: NONREACTIVE

## 2023-04-06 ENCOUNTER — HOSPITAL ENCOUNTER (OUTPATIENT)
Age: 28
Setting detail: SPECIMEN
Discharge: HOME | End: 2023-04-06

## 2023-04-06 ENCOUNTER — OFFICE VISIT (OUTPATIENT)
Dept: OBGYN | Age: 28
End: 2023-04-06

## 2023-04-06 VITALS
BODY MASS INDEX: 31.93 KG/M2 | WEIGHT: 186 LBS | SYSTOLIC BLOOD PRESSURE: 124 MMHG | HEART RATE: 76 BPM | DIASTOLIC BLOOD PRESSURE: 86 MMHG

## 2023-04-06 DIAGNOSIS — N89.8 VAGINAL ITCHING: Primary | ICD-10-CM

## 2023-04-06 DIAGNOSIS — N89.8 VAGINAL ITCHING: ICD-10-CM

## 2023-04-06 RX ORDER — FLUCONAZOLE 150 MG/1
150 TABLET ORAL
Qty: 5 TABLET | Refills: 1 | Status: SHIPPED | OUTPATIENT
Start: 2023-04-06 | End: 2023-04-06

## 2023-04-07 NOTE — PROGRESS NOTES
Attending Physician Statement  I have discussed the care of [address], including pertinent history and exam findings,  with the resident. I have reviewed the key elements of all parts of the encounter with the resident. I agree with the assessment, plan and orders as documented by the resident.   (GE Modifier)    Agapito Dunaway, 
exam with Pap, on. Today with moderate bleeding we will reschedule for weeks to come  -On OCPs, doing well    Patient Active Problem List    Diagnosis Date Noted    Localized swelling of right foot 2022     Priority: Medium    S/P primary low transverse      Normocytic anemia due to blood loss 2020     Hgb 9.4 ()      Family history of sickle cell trait (Pt negative) 2020     Maunt          Return in about 1 month (around 2023) for Annual w/ pap. Counseling Completed:    Counseled about need for repeat pap as per American Society for Colposcopy and Cervical Pathology guidelines. Routine health maintenance per patients PCP discussed. Patient was seen with total face to face time of 15 minutes. More than 50% of this visit was on counseling and education regarding her diagnose(s) as listed below and options. She was also counseled on her preventative health maintenance recommendations and follow-up. Diagnosis Orders   1.  Vaginal itching  Vaginitis DNA Probe          Freida Zaman DO  Ob/Gyn Resident  [facility], [facility]  2023, 3:15 PM

## 2023-06-09 ENCOUNTER — HOSPITAL ENCOUNTER (OUTPATIENT)
Age: 28
Setting detail: SPECIMEN
Discharge: HOME | End: 2023-06-09

## 2023-06-09 ENCOUNTER — OFFICE VISIT (OUTPATIENT)
Dept: OBGYN | Age: 28
End: 2023-06-09
Payer: COMMERCIAL

## 2023-06-09 VITALS
DIASTOLIC BLOOD PRESSURE: 88 MMHG | HEART RATE: 75 BPM | SYSTOLIC BLOOD PRESSURE: 129 MMHG | WEIGHT: 191 LBS | BODY MASS INDEX: 32.79 KG/M2

## 2023-06-09 DIAGNOSIS — N89.8 VAGINAL DISCHARGE: ICD-10-CM

## 2023-06-09 DIAGNOSIS — Z01.419 WELL WOMAN EXAM: Primary | ICD-10-CM

## 2023-06-09 DIAGNOSIS — Z20.2 STD EXPOSURE: ICD-10-CM

## 2023-06-09 PROCEDURE — 90651 9VHPV VACCINE 2/3 DOSE IM: CPT | Performed by: STUDENT IN AN ORGANIZED HEALTH CARE EDUCATION/TRAINING PROGRAM

## 2023-06-09 RX ORDER — METRONIDAZOLE 250 MG/1
250 TABLET ORAL 3 TIMES DAILY
COMMUNITY
End: 2023-06-09

## 2023-06-09 NOTE — PROGRESS NOTES
Attending Physician Statement  I have discussed the care of [address], including pertinent history and exam findings, with the resident. I have reviewed the key elements of all parts of the encounter with the resident. I agree with the assessment, plan and orders as documented by the resident.   (GE Modifier)    Madalyn Abad DO  [address] [facility], [address]  6/9/2023, 12:00 PM
Patient was given Gardasil 9 dose 1  in the Right Deltoid.  Per doctor Larissa Moss  NDC# 2956-5203-86  LOT# [id number]  Exp date- 01/26/2025  Patient tolerated well without difficulty
suppositories if no other STI present. Patient agreeable to this plan. Patient Active Problem List    Diagnosis Date Noted    Localized swelling of right foot 2022     Priority: Medium    S/P primary low transverse      Normocytic anemia due to blood loss 2020     Hgb 9.4 ()        Family history of sickle cell trait (Pt negative) 2020     Maunt            Return in about 1 year (around 2024) for Annual Exam.    Risk Factors-   No risk factors identified at intake  Pap and cultures at next visit  Agreeable to vaccinations in preg  3/2/2020- [facility] referral placed for first trimester screen and anatomy scan      Counseling Completed:    Counseled about need for repeat pap as per American Society for Colposcopy and Cervical Pathology guidelines. Counseled about birth control and barrier recommendations. Counseled about STD counseling and prevention. Counseled about Gardasil counseling for all patients 10-37 yo. Tobacco & Secondary smoke risks discussed; with recommendation for cessation and avoidance. Routine health maintenance per patients PCP discussed. Patient was seen with total face to face time of 20 minutes. More than 50% of this visit was on counseling and education regarding the problems listed below and her options. She was also counseled on her preventative health maintenance recommendations and follow-up. Diagnosis Orders   1. Well woman exam  PAP Smear    HPV, GARDASIL 9, (age 10-36 yrs), IM      2. Vaginal discharge  Chlamydia Trachomatis & Neisseria gonorrhoeae (GC) by amplified detection    Vaginitis DNA Probe      3. STD exposure  HIV Screen    Hepatitis Panel, Acute    T.  Pallidum Ab           Sukhdeep Martin DO  Ob/Gyn Resident  [facility], [facility]  2023, 12:33 PM

## 2023-06-12 PROBLEM — A74.9 CHLAMYDIA: Status: ACTIVE | Noted: 2023-06-12

## 2023-06-18 ENCOUNTER — HOSPITAL ENCOUNTER (EMERGENCY)
Age: 28
Discharge: HOME | End: 2023-06-18
Attending: EMERGENCY MEDICINE
Payer: COMMERCIAL

## 2023-06-18 ENCOUNTER — APPOINTMENT (OUTPATIENT)
Dept: GENERAL RADIOLOGY | Age: 28
End: 2023-06-18
Payer: COMMERCIAL

## 2023-06-18 VITALS
OXYGEN SATURATION: 100 % | RESPIRATION RATE: 16 BRPM | TEMPERATURE: 98.1 F | SYSTOLIC BLOOD PRESSURE: 140 MMHG | DIASTOLIC BLOOD PRESSURE: 88 MMHG | HEART RATE: 91 BPM

## 2023-06-18 DIAGNOSIS — M25.572 ACUTE LEFT ANKLE PAIN: Primary | ICD-10-CM

## 2023-06-18 PROCEDURE — 73610 X-RAY EXAM OF ANKLE: CPT

## 2023-06-18 PROCEDURE — 99283 EMERGENCY DEPT VISIT LOW MDM: CPT

## 2023-06-18 PROCEDURE — 6370000000 HC RX 637 (ALT 250 FOR IP): Performed by: EMERGENCY MEDICINE

## 2023-06-18 RX ORDER — ACETAMINOPHEN 500 MG
500 TABLET ORAL ONCE
Status: COMPLETED | OUTPATIENT
Start: 2023-06-18 | End: 2023-06-18

## 2023-06-18 RX ADMIN — ACETAMINOPHEN 500 MG: 500 TABLET ORAL at 17:59

## 2023-06-18 ASSESSMENT — ENCOUNTER SYMPTOMS
ANAL BLEEDING: 0
ABDOMINAL PAIN: 0
SHORTNESS OF BREATH: 0

## 2023-06-28 ENCOUNTER — SCHEDULED TELEPHONE ENCOUNTER (OUTPATIENT)
Dept: OBGYN | Age: 28
End: 2023-06-28
Payer: COMMERCIAL

## 2023-06-28 DIAGNOSIS — B96.89 BV (BACTERIAL VAGINOSIS): Primary | ICD-10-CM

## 2023-06-28 DIAGNOSIS — N76.0 BV (BACTERIAL VAGINOSIS): Primary | ICD-10-CM

## 2023-06-28 PROCEDURE — 99441 PR PHYS/QHP TELEPHONE EVALUATION 5-10 MIN: CPT

## 2023-06-28 RX ORDER — METRONIDAZOLE 7.5 MG/G
GEL TOPICAL
Qty: 1 EACH | Refills: 1 | Status: SHIPPED | OUTPATIENT
Start: 2023-06-28

## 2024-02-07 ENCOUNTER — OFFICE VISIT (OUTPATIENT)
Dept: OBGYN | Age: 29
End: 2024-02-07
Payer: MEDICAID

## 2024-02-07 VITALS
SYSTOLIC BLOOD PRESSURE: 125 MMHG | HEART RATE: 81 BPM | DIASTOLIC BLOOD PRESSURE: 76 MMHG | BODY MASS INDEX: 32.1 KG/M2 | WEIGHT: 187 LBS

## 2024-02-07 DIAGNOSIS — Z23 NEED FOR VACCINATION: ICD-10-CM

## 2024-02-07 DIAGNOSIS — R10.2 PELVIC PAIN: Primary | ICD-10-CM

## 2024-02-07 DIAGNOSIS — N76.0 BACTERIAL VAGINOSIS: ICD-10-CM

## 2024-02-07 DIAGNOSIS — B96.89 BACTERIAL VAGINOSIS: ICD-10-CM

## 2024-02-07 LAB
CONTROL: PRESENT
PREGNANCY TEST URINE, POC: NORMAL

## 2024-02-07 PROCEDURE — 81025 URINE PREGNANCY TEST: CPT | Performed by: STUDENT IN AN ORGANIZED HEALTH CARE EDUCATION/TRAINING PROGRAM

## 2024-02-07 PROCEDURE — 90651 9VHPV VACCINE 2/3 DOSE IM: CPT | Performed by: OBSTETRICS & GYNECOLOGY

## 2024-02-07 PROCEDURE — 99212 OFFICE O/P EST SF 10 MIN: CPT | Performed by: OBSTETRICS & GYNECOLOGY

## 2024-02-07 PROCEDURE — 99212 OFFICE O/P EST SF 10 MIN: CPT | Performed by: STUDENT IN AN ORGANIZED HEALTH CARE EDUCATION/TRAINING PROGRAM

## 2024-02-07 RX ORDER — CLINDAMYCIN HYDROCHLORIDE 300 MG/1
300 CAPSULE ORAL 2 TIMES DAILY
Qty: 14 CAPSULE | Refills: 0 | Status: SHIPPED | OUTPATIENT
Start: 2024-02-07 | End: 2024-02-14

## 2024-02-07 RX ORDER — FLUCONAZOLE 150 MG/1
150 TABLET ORAL ONCE
Qty: 1 TABLET | Refills: 0 | Status: SHIPPED | OUTPATIENT
Start: 2024-02-07 | End: 2024-02-07

## 2024-02-07 NOTE — PROGRESS NOTES
OB/GYN Problem Visit    Dalila White  2024                       Primary Care Physician: Rock Chau MD    CC:   Chief Complaint   Patient presents with    Follow-up     Pelvic pressure and pain , its very uncomfortable x few weeks          HPI: Dalila White is a 28 y.o. female     The patient was seen and examined. She is here for pelvic pain that began a couple of weeks ago. Patient states the pain is intermittent and is a pressure like sensation, she is unsure if it is related to her menstruation. She reports it improves with motrin and heating pads. She states she has had this pelvic pain in the past that resolved with the Mirena IUD but had it removed as she thought it was causing recurrent BV, she believes she has a BV infection currently due to increased vaginal discharge. Her LMP is 24. She denies any other complaints or concerns.      REVIEW OF SYSTEMS:   Constitutional: negative fever, negative chills, negative weight changes   HEENT: negative visual disturbances, negative headaches, negative dizziness, negative hearing loss  Breast: Negative breast abnormalities, negative breast lumps, negative nipple discharge  Respiratory: negative dyspnea, negative cough, negative SOB  Cardiovascular: negative chest pain,  negative palpitations, negative arrhythmia, negative syncope   Gastrointestinal: negative abdominal pain, negative RUQ pain, negative N/V, negative diarrhea, negative constipation, negative bowel changes, negative heartburn   Genitourinary: negative dysuria, negative hematuria, negative urinary incontinence, positive vaginal discharge, negative vaginal bleeding or spotting, positive pelvic pressure and pain  Musculoskeletal: negative back pain, negative myalgias, negative arthralgias  Neurological:  negative dizziness, negative migraines, negative seizures, negative weakness  Behavior/Psych: negative depression, negative anxiety, negative SI, negative

## 2024-02-07 NOTE — PROGRESS NOTES
Pt present for UPT, LMP 1/15/2024 ,UPT results negative, and results given to pt.    Patient was given Gardasil 9 dose 2  in the Left Deltoid. Per doctor Kyrie  NDC# 1692-4324-07  LOT# [id number]  Exp date- 1/19/2026  Patient tolerated well without difficulty

## 2024-02-08 NOTE — PROGRESS NOTES
Attending Physician Statement  I have discussed the care of Dalila White, including pertinent history and exam findings,  with the resident. I have reviewed the key elements of all parts of the encounter with the resident.  I agree with the assessment, plan and orders as documented by the resident.  (GE Modifier)    Laura Cleary,

## 2024-02-13 ENCOUNTER — NURSE ONLY (OUTPATIENT)
Dept: INTERNAL MEDICINE | Age: 29
End: 2024-02-13
Payer: MEDICAID

## 2024-02-13 VITALS — TEMPERATURE: 97.8 F

## 2024-02-13 DIAGNOSIS — Z23 NEED FOR HEPATITIS B VACCINATION: Primary | ICD-10-CM

## 2024-02-13 PROCEDURE — 90746 HEPB VACCINE 3 DOSE ADULT IM: CPT

## 2024-02-13 PROCEDURE — 99999 PR OFFICE/OUTPT VISIT,PROCEDURE ONLY: CPT | Performed by: INTERNAL MEDICINE

## 2024-02-13 NOTE — PROGRESS NOTES
Pt is here for Hep B vaccination.     Pt presents with no health complaints and is afebrile.    Hep B vaccine given in right deltoid.     This is shot number 2 of 3.    Patient tolerated vaccination well, VIS reviewed and given to patient. Pt scheduled for f/u appt with PCP, also scheduled for third hep B vaccine in August.

## 2024-03-11 ENCOUNTER — SCHEDULED TELEPHONE ENCOUNTER (OUTPATIENT)
Dept: OBGYN | Age: 29
End: 2024-03-11
Payer: MEDICAID

## 2024-03-11 DIAGNOSIS — R10.2 PELVIC PAIN: Primary | ICD-10-CM

## 2024-03-11 PROCEDURE — 99213 OFFICE O/P EST LOW 20 MIN: CPT

## 2024-03-11 NOTE — PROGRESS NOTES
Obstetric/Gynecology Resident Telephone Visit Note    Dalila White is a 28 y.o. female evaluated via telephone on 3/11/2024.      Consent:  She and/or health care decision maker is aware that that she may receive a bill for this telephone service, depending on her insurance coverage, and has provided verbal consent to proceed: Yes    Documentation:  I communicated with the patient and/or health care decision maker about her recent ultrasound.   Details of this discussion including any medical advice provided:     HPI:    Patient has a history of pelvic pain that is described as intermittent pain with pressure.  She is unsure if it is related to her menstruation.  Reports mild improvement with Motrin and heating pads.  Patient previously had a Mirena IUD in place that helped with the pain however it was causing her recurrent bacterial vaginosis and she had it removed.  Patient is also previously been on Depo and oral contraceptive pills in the past with improvement to sx.  Transvaginal ultrasound was ordered and was found to be within normal limits.  Discussion was had with patient regarding pain as well as normal findings on ultrasound.  At this time, discussed replacement of Mirena IUD, Depo, initiation of oral contraceptive pills.  Patient desires to think on it and states she will call clinic to schedule an appointment when she makes her decision.  Patient counseled that she may call the clinic at any time for any further questions.  Patient endorses understanding.    I affirm this is a Patient Initiated Episode with an Established Patient who has not had a related appointment within my department in the past 7 days or scheduled within the next 24 hours.    Patient identification was verified at the start of the visit: Yes    Total Time: minutes: 5-10 minutes    Note: not billable if this call serves to triage the patient into an appointment for the relevant concern      Vanessa Song MD  OBGYN Resident,

## 2024-06-10 SDOH — ECONOMIC STABILITY: HOUSING INSECURITY
IN THE LAST 12 MONTHS, WAS THERE A TIME WHEN YOU DID NOT HAVE A STEADY PLACE TO SLEEP OR SLEPT IN A SHELTER (INCLUDING NOW)?: NO

## 2024-06-10 SDOH — ECONOMIC STABILITY: FOOD INSECURITY: WITHIN THE PAST 12 MONTHS, THE FOOD YOU BOUGHT JUST DIDN'T LAST AND YOU DIDN'T HAVE MONEY TO GET MORE.: NEVER TRUE

## 2024-06-10 SDOH — ECONOMIC STABILITY: FOOD INSECURITY: WITHIN THE PAST 12 MONTHS, YOU WORRIED THAT YOUR FOOD WOULD RUN OUT BEFORE YOU GOT MONEY TO BUY MORE.: NEVER TRUE

## 2024-06-10 SDOH — ECONOMIC STABILITY: INCOME INSECURITY: HOW HARD IS IT FOR YOU TO PAY FOR THE VERY BASICS LIKE FOOD, HOUSING, MEDICAL CARE, AND HEATING?: NOT HARD AT ALL

## 2024-06-10 ASSESSMENT — PATIENT HEALTH QUESTIONNAIRE - PHQ9
3. TROUBLE FALLING OR STAYING ASLEEP: MORE THAN HALF THE DAYS
5. POOR APPETITE OR OVEREATING: NEARLY EVERY DAY
SUM OF ALL RESPONSES TO PHQ QUESTIONS 1-9: 7
9. THOUGHTS THAT YOU WOULD BE BETTER OFF DEAD, OR OF HURTING YOURSELF: NOT AT ALL
SUM OF ALL RESPONSES TO PHQ QUESTIONS 1-9: 10
4. FEELING TIRED OR HAVING LITTLE ENERGY: SEVERAL DAYS
10. IF YOU CHECKED OFF ANY PROBLEMS, HOW DIFFICULT HAVE THESE PROBLEMS MADE IT FOR YOU TO DO YOUR WORK, TAKE CARE OF THINGS AT HOME, OR GET ALONG WITH OTHER PEOPLE: VERY DIFFICULT
5. POOR APPETITE OR OVEREATING: SEVERAL DAYS
9. THOUGHTS THAT YOU WOULD BE BETTER OFF DEAD, OR OF HURTING YOURSELF: NOT AT ALL
6. FEELING BAD ABOUT YOURSELF - OR THAT YOU ARE A FAILURE OR HAVE LET YOURSELF OR YOUR FAMILY DOWN: SEVERAL DAYS
7. TROUBLE CONCENTRATING ON THINGS, SUCH AS READING THE NEWSPAPER OR WATCHING TELEVISION: NOT AT ALL
6. FEELING BAD ABOUT YOURSELF - OR THAT YOU ARE A FAILURE OR HAVE LET YOURSELF OR YOUR FAMILY DOWN: SEVERAL DAYS
3. TROUBLE FALLING OR STAYING ASLEEP: SEVERAL DAYS
8. MOVING OR SPEAKING SO SLOWLY THAT OTHER PEOPLE COULD HAVE NOTICED. OR THE OPPOSITE - BEING SO FIDGETY OR RESTLESS THAT YOU HAVE BEEN MOVING AROUND A LOT MORE THAN USUAL: NOT AT ALL
1. LITTLE INTEREST OR PLEASURE IN DOING THINGS: SEVERAL DAYS
SUM OF ALL RESPONSES TO PHQ9 QUESTIONS 1 & 2: 2
2. FEELING DOWN, DEPRESSED OR HOPELESS: SEVERAL DAYS
4. FEELING TIRED OR HAVING LITTLE ENERGY: MORE THAN HALF THE DAYS
2. FEELING DOWN, DEPRESSED OR HOPELESS: SEVERAL DAYS
SUM OF ALL RESPONSES TO PHQ QUESTIONS 1-9: 10
7. TROUBLE CONCENTRATING ON THINGS, SUCH AS READING THE NEWSPAPER OR WATCHING TELEVISION: SEVERAL DAYS
SUM OF ALL RESPONSES TO PHQ QUESTIONS 1-9: 10
10. IF YOU CHECKED OFF ANY PROBLEMS, HOW DIFFICULT HAVE THESE PROBLEMS MADE IT FOR YOU TO DO YOUR WORK, TAKE CARE OF THINGS AT HOME, OR GET ALONG WITH OTHER PEOPLE: VERY DIFFICULT
SUM OF ALL RESPONSES TO PHQ QUESTIONS 1-9: 10
1. LITTLE INTEREST OR PLEASURE IN DOING THINGS: SEVERAL DAYS

## 2024-06-11 ENCOUNTER — NURSE ONLY (OUTPATIENT)
Dept: OBGYN | Age: 29
End: 2024-06-11
Payer: MEDICAID

## 2024-06-11 DIAGNOSIS — N94.6 DYSMENORRHEA: ICD-10-CM

## 2024-06-11 DIAGNOSIS — Z23 NEED FOR VACCINATION: Primary | ICD-10-CM

## 2024-06-11 PROCEDURE — 90651 9VHPV VACCINE 2/3 DOSE IM: CPT | Performed by: STUDENT IN AN ORGANIZED HEALTH CARE EDUCATION/TRAINING PROGRAM

## 2024-06-11 RX ORDER — MEDROXYPROGESTERONE ACETATE 150 MG/ML
150 INJECTION, SUSPENSION INTRAMUSCULAR ONCE
Status: COMPLETED | OUTPATIENT
Start: 2024-06-11 | End: 2024-06-11

## 2024-06-11 RX ADMIN — MEDROXYPROGESTERONE ACETATE 150 MG: 150 INJECTION, SUSPENSION INTRAMUSCULAR at 14:15

## 2024-06-11 NOTE — PROGRESS NOTES
Patient was given Gardasil 9 in the Right Deltoid. Per doctor Alvarado.  NDC# 2218-5490-23  LOT# [id number]  Exp date- 5/31/2026  Patient tolerated well without difficulty

## 2024-06-12 ENCOUNTER — OFFICE VISIT (OUTPATIENT)
Dept: INTERNAL MEDICINE | Age: 29
End: 2024-06-12
Payer: MEDICAID

## 2024-06-12 ENCOUNTER — HOSPITAL ENCOUNTER (OUTPATIENT)
Age: 29
Setting detail: SPECIMEN
Discharge: HOME | End: 2024-06-12

## 2024-06-12 VITALS
TEMPERATURE: 97.9 F | HEIGHT: 64 IN | HEART RATE: 96 BPM | WEIGHT: 198 LBS | OXYGEN SATURATION: 87 % | DIASTOLIC BLOOD PRESSURE: 89 MMHG | SYSTOLIC BLOOD PRESSURE: 125 MMHG | BODY MASS INDEX: 33.8 KG/M2

## 2024-06-12 DIAGNOSIS — F41.1 GAD (GENERALIZED ANXIETY DISORDER): ICD-10-CM

## 2024-06-12 DIAGNOSIS — F32.A MILD DEPRESSION: ICD-10-CM

## 2024-06-12 DIAGNOSIS — F32.A MILD DEPRESSION: Primary | ICD-10-CM

## 2024-06-12 DIAGNOSIS — D64.9 NORMOCYTIC ANEMIA: ICD-10-CM

## 2024-06-12 LAB
ANION GAP SERPL CALCULATED.3IONS-SCNC: 12 MMOL/L (ref 9–16)
BASOPHILS # BLD: 0.04 K/UL (ref 0–0.2)
BASOPHILS NFR BLD: 1 % (ref 0–2)
BUN SERPL-MCNC: 14 MG/DL (ref 6–20)
CALCIUM SERPL-MCNC: 9.1 MG/DL (ref 8.6–10.4)
CHLORIDE SERPL-SCNC: 106 MMOL/L (ref 98–107)
CO2 SERPL-SCNC: 24 MMOL/L (ref 20–31)
CREAT SERPL-MCNC: 0.7 MG/DL (ref 0.5–0.9)
EOSINOPHIL # BLD: 0.09 K/UL (ref 0–0.44)
EOSINOPHILS RELATIVE PERCENT: 2 % (ref 1–4)
ERYTHROCYTE [DISTWIDTH] IN BLOOD BY AUTOMATED COUNT: 15.4 % (ref 11.8–14.4)
GFR, ESTIMATED: >90 ML/MIN/1.73M2
GLUCOSE SERPL-MCNC: 77 MG/DL (ref 74–99)
HCT VFR BLD AUTO: 32.7 % (ref 36.3–47.1)
HGB BLD-MCNC: 10 G/DL (ref 11.9–15.1)
IMM GRANULOCYTES # BLD AUTO: <0.03 K/UL (ref 0–0.3)
IMM GRANULOCYTES NFR BLD: 0 %
LYMPHOCYTES NFR BLD: 2.08 K/UL (ref 1.1–3.7)
LYMPHOCYTES RELATIVE PERCENT: 36 % (ref 24–43)
MCH RBC QN AUTO: 27.9 PG (ref 25.2–33.5)
MCHC RBC AUTO-ENTMCNC: 30.6 G/DL (ref 28.4–34.8)
MCV RBC AUTO: 91.3 FL (ref 82.6–102.9)
MONOCYTES NFR BLD: 0.43 K/UL (ref 0.1–1.2)
MONOCYTES NFR BLD: 8 % (ref 3–12)
NEUTROPHILS NFR BLD: 53 % (ref 36–65)
NEUTS SEG NFR BLD: 3.05 K/UL (ref 1.5–8.1)
NRBC BLD-RTO: 0 PER 100 WBC
PLATELET # BLD AUTO: 335 K/UL (ref 138–453)
PMV BLD AUTO: 11 FL (ref 8.1–13.5)
POTASSIUM SERPL-SCNC: 3.4 MMOL/L (ref 3.7–5.3)
RBC # BLD AUTO: 3.58 M/UL (ref 3.95–5.11)
RBC # BLD: ABNORMAL 10*6/UL
SODIUM SERPL-SCNC: 142 MMOL/L (ref 136–145)
TSH SERPL DL<=0.05 MIU/L-ACNC: 1.03 UIU/ML (ref 0.27–4.2)
WBC OTHER # BLD: 5.7 K/UL (ref 3.5–11.3)

## 2024-06-12 PROCEDURE — 99211 OFF/OP EST MAY X REQ PHY/QHP: CPT | Performed by: HOSPITALIST

## 2024-06-12 PROCEDURE — 99213 OFFICE O/P EST LOW 20 MIN: CPT

## 2024-06-12 RX ORDER — DULOXETIN HYDROCHLORIDE 60 MG/1
60 CAPSULE, DELAYED RELEASE ORAL DAILY
Qty: 30 CAPSULE | Refills: 2 | Status: SHIPPED | OUTPATIENT
Start: 2024-06-12

## 2024-06-12 ASSESSMENT — ANXIETY QUESTIONNAIRES
7. FEELING AFRAID AS IF SOMETHING AWFUL MIGHT HAPPEN: NEARLY EVERY DAY
4. TROUBLE RELAXING: NEARLY EVERY DAY
2. NOT BEING ABLE TO STOP OR CONTROL WORRYING: NEARLY EVERY DAY
GAD7 TOTAL SCORE: 17
5. BEING SO RESTLESS THAT IT IS HARD TO SIT STILL: NOT AT ALL
1. FEELING NERVOUS, ANXIOUS, OR ON EDGE: MORE THAN HALF THE DAYS
IF YOU CHECKED OFF ANY PROBLEMS ON THIS QUESTIONNAIRE, HOW DIFFICULT HAVE THESE PROBLEMS MADE IT FOR YOU TO DO YOUR WORK, TAKE CARE OF THINGS AT HOME, OR GET ALONG WITH OTHER PEOPLE: VERY DIFFICULT
6. BECOMING EASILY ANNOYED OR IRRITABLE: NEARLY EVERY DAY
3. WORRYING TOO MUCH ABOUT DIFFERENT THINGS: NEARLY EVERY DAY

## 2024-06-12 ASSESSMENT — PATIENT HEALTH QUESTIONNAIRE - PHQ9
7. TROUBLE CONCENTRATING ON THINGS, SUCH AS READING THE NEWSPAPER OR WATCHING TELEVISION: MORE THAN HALF THE DAYS
5. POOR APPETITE OR OVEREATING: MORE THAN HALF THE DAYS
2. FEELING DOWN, DEPRESSED OR HOPELESS: MORE THAN HALF THE DAYS
6. FEELING BAD ABOUT YOURSELF - OR THAT YOU ARE A FAILURE OR HAVE LET YOURSELF OR YOUR FAMILY DOWN: SEVERAL DAYS
SUM OF ALL RESPONSES TO PHQ QUESTIONS 1-9: 14
4. FEELING TIRED OR HAVING LITTLE ENERGY: MORE THAN HALF THE DAYS
8. MOVING OR SPEAKING SO SLOWLY THAT OTHER PEOPLE COULD HAVE NOTICED. OR THE OPPOSITE, BEING SO FIGETY OR RESTLESS THAT YOU HAVE BEEN MOVING AROUND A LOT MORE THAN USUAL: SEVERAL DAYS
SUM OF ALL RESPONSES TO PHQ QUESTIONS 1-9: 14
SUM OF ALL RESPONSES TO PHQ QUESTIONS 1-9: 14
SUM OF ALL RESPONSES TO PHQ9 QUESTIONS 1 & 2: 4
10. IF YOU CHECKED OFF ANY PROBLEMS, HOW DIFFICULT HAVE THESE PROBLEMS MADE IT FOR YOU TO DO YOUR WORK, TAKE CARE OF THINGS AT HOME, OR GET ALONG WITH OTHER PEOPLE: VERY DIFFICULT
SUM OF ALL RESPONSES TO PHQ QUESTIONS 1-9: 14
3. TROUBLE FALLING OR STAYING ASLEEP: MORE THAN HALF THE DAYS
9. THOUGHTS THAT YOU WOULD BE BETTER OFF DEAD, OR OF HURTING YOURSELF: NOT AT ALL
1. LITTLE INTEREST OR PLEASURE IN DOING THINGS: MORE THAN HALF THE DAYS

## 2024-06-12 ASSESSMENT — ENCOUNTER SYMPTOMS
EYE REDNESS: 0
ABDOMINAL PAIN: 0
SHORTNESS OF BREATH: 0
CHOKING: 0
COUGH: 0
BACK PAIN: 0
EYE PAIN: 0

## 2024-06-12 ASSESSMENT — COLUMBIA-SUICIDE SEVERITY RATING SCALE - C-SSRS
1. WITHIN THE PAST MONTH, HAVE YOU WISHED YOU WERE DEAD OR WISHED YOU COULD GO TO SLEEP AND NOT WAKE UP?: NO
2. HAVE YOU ACTUALLY HAD ANY THOUGHTS OF KILLING YOURSELF?: NO
6. HAVE YOU EVER DONE ANYTHING, STARTED TO DO ANYTHING, OR PREPARED TO DO ANYTHING TO END YOUR LIFE?: NO

## 2024-06-12 NOTE — PROGRESS NOTES
Attending Physician Statement  I have discussed the care of Dalila White, including pertinent history and exam findings with the resident. I have reviewed the key elements of all parts of the encounter with the resident. I have seen and examined the patient with the resident and the key elements of all parts of the encounter have been performed by me.  I agree with the assessment, and status of the problem list as documented. The plan and orders should include   Orders Placed This Encounter   Procedures    CBC with Auto Differential    Basic Metabolic Panel    TSH With Reflex Ft4    and this was also documented by the resident. The medication list was reviewed with the resident and is up to date. The return visit should be in 4 weeks .    Kayode Torres MD  Attending Physician,  Department of Internal Medicine  [facility]  [facility]      6/12/2024, 2:38 PM    
3-dose series) 2024       No Known Allergies      Current Outpatient Medications   Medication Sig Dispense Refill    DULoxetine (CYMBALTA) 60 MG extended release capsule Take 1 capsule by mouth daily 30 capsule 2    acetaminophen (TYLENOL) 325 MG tablet Take 2 tablets by mouth every 6 hours as needed for Pain 30 tablet 0    topiramate (TOPAMAX) 25 MG tablet Take 1 tablet by mouth nightly 30 tablet 3     No current facility-administered medications for this visit.       Social History     Tobacco Use    Smoking status: Never    Smokeless tobacco: Never   Vaping Use    Vaping Use: Never used   Substance Use Topics    Alcohol use: No    Drug use: No       Family History   Problem Relation Age of Onset    No Known Problems Mother     No Known Problems Father     Other Maternal Grandmother         diverticulitis    No Known Problems Paternal Grandfather     No Known Problems Paternal Grandmother     No Known Problems Maternal Grandfather     No Known Problems Brother     No Known Problems Sister     Breast Cancer Neg Hx     Cancer Neg Hx     Colon Cancer Neg Hx     Diabetes Neg Hx     Eclampsia Neg Hx     Hypertension Neg Hx     Ovarian Cancer Neg Hx      Labor Neg Hx     Spont Abortions Neg Hx     Stroke Neg Hx         REVIEW OF SYSTEMS:  Review of Systems   Constitutional:  Positive for fatigue and unexpected weight change. Negative for chills and fever.   HENT:  Negative for ear pain.    Eyes:  Negative for pain and redness.   Respiratory:  Negative for cough, choking and shortness of breath.    Cardiovascular:  Negative for chest pain and leg swelling.   Gastrointestinal:  Negative for abdominal pain.   Endocrine: Negative for polydipsia.   Genitourinary:  Negative for dysuria.   Musculoskeletal:  Negative for arthralgias and back pain.   Skin:  Negative for pallor.   Neurological:  Negative for dizziness and headaches.   Psychiatric/Behavioral:  Positive for decreased concentration, dysphoric mood and

## 2024-07-03 ENCOUNTER — TELEPHONE (OUTPATIENT)
Dept: INTERNAL MEDICINE | Age: 29
End: 2024-07-03

## 2024-08-13 ENCOUNTER — NURSE ONLY (OUTPATIENT)
Dept: INTERNAL MEDICINE | Age: 29
End: 2024-08-13
Payer: MEDICAID

## 2024-08-13 DIAGNOSIS — Z23 NEED FOR HEPATITIS B VACCINATION: Primary | ICD-10-CM

## 2024-08-13 PROCEDURE — 90746 HEPB VACCINE 3 DOSE ADULT IM: CPT | Performed by: STUDENT IN AN ORGANIZED HEALTH CARE EDUCATION/TRAINING PROGRAM

## 2024-08-13 NOTE — PROGRESS NOTES
Pt is here for Hep B vaccination.     Pt presents with no health complaints and is afebrile.    Hep B vaccine given in left deltoid.     This is shot number 3 of 3.    Patient tolerated vaccination well, VIS reviewed and given to patient.

## 2025-01-15 ENCOUNTER — OFFICE VISIT (OUTPATIENT)
Dept: INTERNAL MEDICINE | Age: 30
End: 2025-01-15
Payer: MEDICAID

## 2025-01-15 VITALS
WEIGHT: 203 LBS | BODY MASS INDEX: 34.66 KG/M2 | DIASTOLIC BLOOD PRESSURE: 82 MMHG | OXYGEN SATURATION: 98 % | SYSTOLIC BLOOD PRESSURE: 130 MMHG | HEIGHT: 64 IN | HEART RATE: 80 BPM

## 2025-01-15 DIAGNOSIS — E55.9 VITAMIN D DEFICIENCY: ICD-10-CM

## 2025-01-15 DIAGNOSIS — F32.A DEPRESSION, UNSPECIFIED DEPRESSION TYPE: Primary | ICD-10-CM

## 2025-01-15 DIAGNOSIS — G47.00 INSOMNIA, UNSPECIFIED TYPE: ICD-10-CM

## 2025-01-15 PROCEDURE — 99213 OFFICE O/P EST LOW 20 MIN: CPT

## 2025-01-15 RX ORDER — MIRTAZAPINE 15 MG/1
15 TABLET, FILM COATED ORAL NIGHTLY
Qty: 30 TABLET | Refills: 1 | Status: SHIPPED | OUTPATIENT
Start: 2025-01-15

## 2025-01-15 ASSESSMENT — PATIENT HEALTH QUESTIONNAIRE - PHQ9
1. LITTLE INTEREST OR PLEASURE IN DOING THINGS: MORE THAN HALF THE DAYS
SUM OF ALL RESPONSES TO PHQ QUESTIONS 1-9: 12
SUM OF ALL RESPONSES TO PHQ9 QUESTIONS 1 & 2: 4
9. THOUGHTS THAT YOU WOULD BE BETTER OFF DEAD, OR OF HURTING YOURSELF: NOT AT ALL
SUM OF ALL RESPONSES TO PHQ QUESTIONS 1-9: 12
6. FEELING BAD ABOUT YOURSELF - OR THAT YOU ARE A FAILURE OR HAVE LET YOURSELF OR YOUR FAMILY DOWN: NOT AT ALL
10. IF YOU CHECKED OFF ANY PROBLEMS, HOW DIFFICULT HAVE THESE PROBLEMS MADE IT FOR YOU TO DO YOUR WORK, TAKE CARE OF THINGS AT HOME, OR GET ALONG WITH OTHER PEOPLE: NOT DIFFICULT AT ALL
4. FEELING TIRED OR HAVING LITTLE ENERGY: NEARLY EVERY DAY
5. POOR APPETITE OR OVEREATING: NEARLY EVERY DAY
SUM OF ALL RESPONSES TO PHQ QUESTIONS 1-9: 12
3. TROUBLE FALLING OR STAYING ASLEEP: MORE THAN HALF THE DAYS
2. FEELING DOWN, DEPRESSED OR HOPELESS: MORE THAN HALF THE DAYS
7. TROUBLE CONCENTRATING ON THINGS, SUCH AS READING THE NEWSPAPER OR WATCHING TELEVISION: NOT AT ALL
8. MOVING OR SPEAKING SO SLOWLY THAT OTHER PEOPLE COULD HAVE NOTICED. OR THE OPPOSITE, BEING SO FIGETY OR RESTLESS THAT YOU HAVE BEEN MOVING AROUND A LOT MORE THAN USUAL: NOT AT ALL
SUM OF ALL RESPONSES TO PHQ QUESTIONS 1-9: 12

## 2025-01-15 ASSESSMENT — ENCOUNTER SYMPTOMS
COUGH: 0
SHORTNESS OF BREATH: 0
CHOKING: 0
ABDOMINAL PAIN: 0
BACK PAIN: 0

## 2025-01-15 NOTE — PROGRESS NOTES
Attending Physician Statement  I have discussed the care of Dalila White, including pertinent history and exam findings with the resident. I have reviewed the key elements of all parts of the encounter with the resident. I agree with the assessment, and status of the problem list as documented. The plan and orders should include   Orders Placed This Encounter   Procedures    CBC with Auto Differential    Vitamin D 25 Hydroxy    and this was also documented by the resident. The medication list was reviewed with the resident and is up to date. The return visit should be in 4 weeks .    Kayode Torres MD  Attending Physician,  Department of Internal Medicine  [facility]  [facility]      1/15/2025, 4:29 PM    
hallucinations, self-injury and suicidal ideas. The patient is not nervous/anxious and is not hyperactive.        PHYSICAL EXAM:  Vitals:    01/15/25 1556   BP: 130/82   Site: Right Upper Arm   Position: Sitting   Cuff Size: Medium Adult   Pulse: 80   SpO2: 98%   Weight: 92.1 kg (203 lb)   Height: 1.626 m (5' 4\")     BP Readings from Last 3 Encounters:   01/15/25 130/82   06/12/24 125/89   02/07/24 125/76        Physical Exam  Constitutional:       General: She is not in acute distress.     Appearance: Normal appearance. She is obese.   HENT:      Head: Normocephalic.      Right Ear: External ear normal.      Left Ear: External ear normal.      Nose: Nose normal.      Mouth/Throat:      Mouth: Mucous membranes are moist.   Cardiovascular:      Rate and Rhythm: Normal rate and regular rhythm.      Pulses: Normal pulses.      Heart sounds: No murmur heard.  Pulmonary:      Effort: Pulmonary effort is normal. No respiratory distress.      Breath sounds: No wheezing or rhonchi.   Abdominal:      General: There is no distension.      Palpations: Abdomen is soft.      Tenderness: There is no abdominal tenderness.      Hernia: No hernia is present.   Musculoskeletal:         General: No swelling.      Right lower leg: No edema.      Left lower leg: No edema.   Skin:     Coloration: Skin is not jaundiced.      Findings: No bruising or erythema.   Neurological:      General: No focal deficit present.      Mental Status: She is alert and oriented to person, place, and time.   Psychiatric:         Attention and Perception: Attention normal. She is attentive.         Mood and Affect: Mood is depressed.         Speech: Speech normal.         Behavior: Behavior normal.         Thought Content: Thought content normal.         Cognition and Memory: Cognition normal.         Judgment: Judgment normal.           DIAGNOSTIC FINDINGS:  CBC:  Lab Results   Component Value Date/Time    WBC 5.7 06/12/2024 03:45 PM    HGB 10.0 06/12/2024

## 2025-01-16 ENCOUNTER — HOSPITAL ENCOUNTER (OUTPATIENT)
Age: 30
Setting detail: SPECIMEN
Discharge: HOME | End: 2025-01-16

## 2025-01-16 DIAGNOSIS — F32.A DEPRESSION, UNSPECIFIED DEPRESSION TYPE: ICD-10-CM

## 2025-01-16 DIAGNOSIS — E55.9 VITAMIN D DEFICIENCY: ICD-10-CM

## 2025-01-16 LAB
25(OH)D3 SERPL-MCNC: 8.6 NG/ML (ref 30–100)
BASOPHILS # BLD: 0.04 K/UL (ref 0–0.2)
BASOPHILS NFR BLD: 1 % (ref 0–2)
EOSINOPHIL # BLD: 0.06 K/UL (ref 0–0.44)
EOSINOPHILS RELATIVE PERCENT: 1 % (ref 1–4)
ERYTHROCYTE [DISTWIDTH] IN BLOOD BY AUTOMATED COUNT: 15.9 % (ref 11.8–14.4)
HCT VFR BLD AUTO: 30 % (ref 36.3–47.1)
HGB BLD-MCNC: 9.1 G/DL (ref 11.9–15.1)
IMM GRANULOCYTES # BLD AUTO: <0.03 K/UL (ref 0–0.3)
IMM GRANULOCYTES NFR BLD: 0 %
LYMPHOCYTES NFR BLD: 2.04 K/UL (ref 1.1–3.7)
LYMPHOCYTES RELATIVE PERCENT: 47 % (ref 24–43)
MCH RBC QN AUTO: 26.2 PG (ref 25.2–33.5)
MCHC RBC AUTO-ENTMCNC: 30.3 G/DL (ref 28.4–34.8)
MCV RBC AUTO: 86.5 FL (ref 82.6–102.9)
MONOCYTES NFR BLD: 0.41 K/UL (ref 0.1–1.2)
MONOCYTES NFR BLD: 10 % (ref 3–12)
NEUTROPHILS NFR BLD: 41 % (ref 36–65)
NEUTS SEG NFR BLD: 1.77 K/UL (ref 1.5–8.1)
NRBC BLD-RTO: 0 PER 100 WBC
PLATELET # BLD AUTO: 279 K/UL (ref 138–453)
PMV BLD AUTO: 11.6 FL (ref 8.1–13.5)
RBC # BLD AUTO: 3.47 M/UL (ref 3.95–5.11)
RBC # BLD: ABNORMAL 10*6/UL
WBC OTHER # BLD: 4.3 K/UL (ref 3.5–11.3)

## 2025-01-17 DIAGNOSIS — E55.9 VITAMIN D DEFICIENCY: Primary | ICD-10-CM

## 2025-01-17 RX ORDER — ERGOCALCIFEROL 1.25 MG/1
50000 CAPSULE, LIQUID FILLED ORAL WEEKLY
Qty: 12 CAPSULE | Refills: 1 | Status: SHIPPED | OUTPATIENT
Start: 2025-01-17

## 2025-01-17 NOTE — PROGRESS NOTES
Patient informed. Patient states that she will  her medication and start it as soon as possible. Patient had no further questions or concerns at this time.

## 2025-01-20 ENCOUNTER — HOSPITAL ENCOUNTER (OUTPATIENT)
Age: 30
Discharge: HOME | End: 2025-01-20

## 2025-01-20 PROCEDURE — 86481 TB AG RESPONSE T-CELL SUSP: CPT

## 2025-01-23 LAB — T-SPOT TB TEST: NORMAL

## 2025-01-24 NOTE — PROGRESS NOTES
"Subjective     History of Present Illness  Linda Morfin is a 18 y.o.  female is being seen today for   Chief Complaint   Patient presents with    Gynecologic Exam     Gyn f/u iud string check     Patient here today for 6 week IUD check up. Paragard IUD inserted 2024. TVUS after insertion confirms IUD in correct position. Has had bleeding since insertion but just light spotting now. Occasional mild cramps, not even requiring tylenol or ibuprofen. No fevers.    Relevant data reviewed:  US Non-ob Transvaginal (2024 11:35)     The following portions of the patient's history were reviewed and updated as appropriate: allergies, current medications, past family history, past medical history, past social history, past surgical history and problem list.    /68   Ht 157.5 cm (62\")   Wt 63 kg (139 lb)   BMI 25.42 kg/m²         Review of Systems   Constitutional: Negative.    HENT: Negative.     Eyes: Negative.    Respiratory: Negative.     Cardiovascular: Negative.    Gastrointestinal: Negative.    Endocrine: Negative.    Genitourinary: Negative.    Musculoskeletal: Negative.    Skin: Negative.    Allergic/Immunologic: Negative.    Neurological: Negative.    Hematological: Negative.    Psychiatric/Behavioral: Negative.         Objective   Physical Exam  Constitutional:       General: She is not in acute distress.  Cardiovascular:      Rate and Rhythm: Normal rate and regular rhythm.   Pulmonary:      Effort: Pulmonary effort is normal.      Breath sounds: Normal breath sounds.   Genitourinary:     Exam position: Lithotomy position.      Labia:         Right: No rash, tenderness or lesion.         Left: No rash, tenderness or lesion.       Vagina: Normal.      Cervix: Normal.      Comments: IUD strings visualized at os   Neurological:      Mental Status: She is alert and oriented to person, place, and time.   Psychiatric:         Mood and Affect: Mood normal.         Behavior: Behavior normal.   " Vaccine Information Sheet, \"Influenza - Inactivated\"  given to Sunilan, or parent/legal guardian of  Vinicio and verbalized understanding. Patient responses:    Is the person being vaccinated sick today? No    Does the person to be vaccinated have an allergy to a component of the vaccine? No    Has the person to be vaccinated ever had a reaction to the flu vaccine in the past?  No    Have you ever had Guillian Marcell Syndrome? No    Flu vaccine given per order. Please see immunization tab.       Thought Content: Thought content normal.         Judgment: Judgment normal.           Assessment & Plan   Diagnoses and all orders for this visit:    1. IUD (intrauterine device) in place (Primary)      -IUD strings visualized on exam, IUD in place.  -Advised condom use for STD prevention if she is sexually active.  -Return for annual exam scheduled 2/28/2025, or sooner as needed.     All questions answered. Patient verbalizes understanding and is agreeable to plan.  Return for Next scheduled follow up.

## 2025-02-12 ENCOUNTER — OFFICE VISIT (OUTPATIENT)
Dept: INTERNAL MEDICINE | Age: 30
End: 2025-02-12
Payer: MEDICAID

## 2025-02-12 VITALS
TEMPERATURE: 97.3 F | BODY MASS INDEX: 34.97 KG/M2 | SYSTOLIC BLOOD PRESSURE: 144 MMHG | WEIGHT: 204.8 LBS | HEIGHT: 64 IN | DIASTOLIC BLOOD PRESSURE: 92 MMHG | OXYGEN SATURATION: 99 % | HEART RATE: 86 BPM

## 2025-02-12 DIAGNOSIS — D64.9 NORMOCYTIC ANEMIA: Primary | ICD-10-CM

## 2025-02-12 DIAGNOSIS — F32.5 MAJOR DEPRESSIVE DISORDER WITH SINGLE EPISODE, IN REMISSION: ICD-10-CM

## 2025-02-12 PROCEDURE — 99213 OFFICE O/P EST LOW 20 MIN: CPT

## 2025-02-12 RX ORDER — PANTOPRAZOLE SODIUM 40 MG/1
TABLET, DELAYED RELEASE ORAL
COMMUNITY

## 2025-02-12 SDOH — ECONOMIC STABILITY: FOOD INSECURITY: WITHIN THE PAST 12 MONTHS, YOU WORRIED THAT YOUR FOOD WOULD RUN OUT BEFORE YOU GOT MONEY TO BUY MORE.: NEVER TRUE

## 2025-02-12 SDOH — ECONOMIC STABILITY: INCOME INSECURITY: IN THE LAST 12 MONTHS, WAS THERE A TIME WHEN YOU WERE NOT ABLE TO PAY THE MORTGAGE OR RENT ON TIME?: NO

## 2025-02-12 SDOH — ECONOMIC STABILITY: FOOD INSECURITY: WITHIN THE PAST 12 MONTHS, THE FOOD YOU BOUGHT JUST DIDN'T LAST AND YOU DIDN'T HAVE MONEY TO GET MORE.: NEVER TRUE

## 2025-02-12 ASSESSMENT — PATIENT HEALTH QUESTIONNAIRE - PHQ9
8. MOVING OR SPEAKING SO SLOWLY THAT OTHER PEOPLE COULD HAVE NOTICED. OR THE OPPOSITE, BEING SO FIGETY OR RESTLESS THAT YOU HAVE BEEN MOVING AROUND A LOT MORE THAN USUAL: NOT AT ALL
7. TROUBLE CONCENTRATING ON THINGS, SUCH AS READING THE NEWSPAPER OR WATCHING TELEVISION: NOT AT ALL
5. POOR APPETITE OR OVEREATING: NEARLY EVERY DAY
1. LITTLE INTEREST OR PLEASURE IN DOING THINGS: NOT AT ALL
SUM OF ALL RESPONSES TO PHQ QUESTIONS 1-9: 3
3. TROUBLE FALLING OR STAYING ASLEEP: NOT AT ALL
2. FEELING DOWN, DEPRESSED OR HOPELESS: NOT AT ALL
6. FEELING BAD ABOUT YOURSELF - OR THAT YOU ARE A FAILURE OR HAVE LET YOURSELF OR YOUR FAMILY DOWN: NOT AT ALL
SUM OF ALL RESPONSES TO PHQ QUESTIONS 1-9: 3
SUM OF ALL RESPONSES TO PHQ QUESTIONS 1-9: 3
SUM OF ALL RESPONSES TO PHQ9 QUESTIONS 1 & 2: 0
9. THOUGHTS THAT YOU WOULD BE BETTER OFF DEAD, OR OF HURTING YOURSELF: NOT AT ALL
SUM OF ALL RESPONSES TO PHQ QUESTIONS 1-9: 3
10. IF YOU CHECKED OFF ANY PROBLEMS, HOW DIFFICULT HAVE THESE PROBLEMS MADE IT FOR YOU TO DO YOUR WORK, TAKE CARE OF THINGS AT HOME, OR GET ALONG WITH OTHER PEOPLE: NOT DIFFICULT AT ALL
4. FEELING TIRED OR HAVING LITTLE ENERGY: NOT AT ALL

## 2025-02-12 NOTE — PROGRESS NOTES
Attending Physician Statement  I have discussed the care of Dalila White, including pertinent history and exam findings with the resident. I have reviewed the key elements of all parts of the encounter with the resident.  I agree with the assessment, and status of the problem list as documented. The plan and orders should include   Orders Placed This Encounter   Procedures    Vitamin B12 & Folate    Iron and TIBC    Ferritin    and this was also documented by the resident. The medication list was reviewed with the resident and is up to date. The return visit should be in 3 months .    Kayode Torres MD  Attending Physician,  Department of Internal Medicine  [facility] Internal Medicine  [facility]      2/12/2025, 4:11 PM

## 2025-02-12 NOTE — PROGRESS NOTES
[facility]  [facility]  [address] [place] [address]  Dept: [phone]  Dept Fax: [phone]    Office Progress/Follow Up Note  Date ofpatient's visit: 2/12/2025  Patient's Name:  Dalila White YOB: 1995            Patient Care Team:  Rock Chau MD as PCP - General (Internal Medicine)  Lalo Aragon MD as Obstetrician (Perinatology)  ================================================================    REASON FOR VISIT/CHIEF COMPLAINT:  Depression (4 week follow up. Pt says the Remeron is causing here to become very hungry and gain weight. It also makes pt very sleepy. Pt says she has been eating big meals) and Hypertension (Running high today)    HISTORY OF PRESENTING ILLNESS:  History was obtained from: patient, electronic medical record. Dalila Whiteis a 29 y.o. past medical history of depression, normocytic anemia is here for a follow-up for depression.  On last visit patient was started on Remeron.  Patient states she feels better and PHQ-9 has gone down from 12-3 on this visit.  Patient has been able to sleep well.  However she mentions that she feels hungry and states that she does not want to gain weight.  Otherwise denies any other complaints  Patient used to follow-up with OB/GYN for heavy menstrual period's and did not follow-up for last 1 year.  Advised patient to make an appointment.  Hemoglobin on most recent CBC was 9.1 with normal MCV.  Previously patient has iron deficiency  Denies cigarette smoking or using any illicit drugs  Drinks alcohol socially  States previously she used to eat spicy food and went to ER in a month ago with abdominal pain and was prescribed Protonix which she has been taking regularly and currently she is not eating spicy food and has felt better.  Denies any abdominal discomfort currently    Patient Active Problem List   Diagnosis    Family history of sickle cell trait (Pt negative)    Normocytic

## 2025-04-10 ENCOUNTER — OFFICE VISIT (OUTPATIENT)
Dept: OBGYN | Age: 30
End: 2025-04-10
Payer: MEDICAID

## 2025-04-10 ENCOUNTER — HOSPITAL ENCOUNTER (OUTPATIENT)
Age: 30
Setting detail: SPECIMEN
Discharge: HOME | End: 2025-04-10

## 2025-04-10 VITALS
DIASTOLIC BLOOD PRESSURE: 79 MMHG | WEIGHT: 199 LBS | BODY MASS INDEX: 34.14 KG/M2 | SYSTOLIC BLOOD PRESSURE: 128 MMHG | HEART RATE: 103 BPM

## 2025-04-10 DIAGNOSIS — Z01.419 WELL WOMAN EXAM: ICD-10-CM

## 2025-04-10 DIAGNOSIS — N89.8 VAGINAL DISCHARGE: ICD-10-CM

## 2025-04-10 DIAGNOSIS — N93.9 ABNORMAL UTERINE BLEEDING: ICD-10-CM

## 2025-04-10 DIAGNOSIS — Z11.3 ROUTINE SCREENING FOR STI (SEXUALLY TRANSMITTED INFECTION): ICD-10-CM

## 2025-04-10 DIAGNOSIS — Z01.419 WELL WOMAN EXAM: Primary | ICD-10-CM

## 2025-04-10 PROCEDURE — 99211 OFF/OP EST MAY X REQ PHY/QHP: CPT

## 2025-04-10 PROCEDURE — 99395 PREV VISIT EST AGE 18-39: CPT

## 2025-04-10 NOTE — PROGRESS NOTES
[facility] OB/GYN Annual Visit  Date of patient's visit: 4/10/2025    Patient's Name:  Dalila White     YOB: 1995            Patient Care Team:  Rock Chau MD as PCP - General (Internal Medicine)  Lalo Aragon MD as Obstetrician (Perinatology)     SUBJECTIVE:    Chief Complaint:   Chief Complaint   Patient presents with    Annual Exam       History of Presenting Illness:    History was obtained from the patient. Dalila White is a 29 y.o. female     The patient was seen and examined. She is here for an annual visit.  She also complains of abnormal uterine bleeding.    The patient states that she has monthly periods, 28 days apart, with bleeding that last 5 to 7 days.  Over the last 5 months she has experienced significant increased vaginal bleeding during her menstruation with intermittent breakthrough bleeding.  Her bleeding is significant, soaking a pad per hour at times, symptoms soaking through her clothing.  She states that her cramping is worse than it used to be, but bearable with NSAIDs.    Her bowel habits are regular. She denies any bloating.  She denies dysuria. She denies urinary leaking.  She reports vaginal discharge for the last 2 weeks.  She is sexually active with 1 female partner.  She uses no method for contraception and is not desiring pregnancy.    Depression Screen: Symptoms of decreased mood absent  **If either question is answered in a  positive fashion then complete the PHQ9 Scoring Evaluation and make the appropriate referral**    Review of Systems:    An 11 point review of systems was completed    REVIEW OF SYSTEMS:   Constitutional: negative fever, negative chills, negative weight changes   HEENT: negative visual disturbances, negative headaches, negative dizziness, negative hearing loss  Breast: Negative breast abnormalities, negative breast lumps, negative nipple discharge  Respiratory: negative dyspnea, negative cough, negative SOB  Cardiovascular:

## 2025-04-11 ENCOUNTER — HOSPITAL ENCOUNTER (OUTPATIENT)
Age: 30
Setting detail: SPECIMEN
Discharge: HOME | End: 2025-04-11

## 2025-04-11 ENCOUNTER — RESULTS FOLLOW-UP (OUTPATIENT)
Dept: OBGYN | Age: 30
End: 2025-04-11

## 2025-04-11 DIAGNOSIS — N93.9 ABNORMAL UTERINE BLEEDING: ICD-10-CM

## 2025-04-11 DIAGNOSIS — N76.0 BACTERIAL VAGINOSIS: Primary | ICD-10-CM

## 2025-04-11 DIAGNOSIS — N89.8 VAGINAL DISCHARGE: ICD-10-CM

## 2025-04-11 DIAGNOSIS — Z01.419 WELL WOMAN EXAM: ICD-10-CM

## 2025-04-11 DIAGNOSIS — Z11.3 ROUTINE SCREENING FOR STI (SEXUALLY TRANSMITTED INFECTION): ICD-10-CM

## 2025-04-11 DIAGNOSIS — B96.89 BACTERIAL VAGINOSIS: Primary | ICD-10-CM

## 2025-04-11 DIAGNOSIS — D64.9 NORMOCYTIC ANEMIA: ICD-10-CM

## 2025-04-11 LAB
C TRACH DNA SPEC QL PROBE+SIG AMP: NEGATIVE
CANDIDA SPECIES: NEGATIVE
EST. AVERAGE GLUCOSE BLD GHB EST-MCNC: 97 MG/DL
FERRITIN SERPL-MCNC: 10 NG/ML (ref 15–150)
FOLATE SERPL-MCNC: 9.2 NG/ML (ref 4.8–24.2)
GARDNERELLA VAGINALIS: POSITIVE
HBA1C MFR BLD: 5 % (ref 4–6)
HBV SURFACE AG SERPL QL IA: NONREACTIVE
HCV AB SERPL QL IA: NONREACTIVE
HIV 1+2 AB+HIV1 P24 AG SERPL QL IA: NONREACTIVE
IRON SATN MFR SERPL: 19 % (ref 20–55)
IRON SERPL-MCNC: 87 UG/DL (ref 37–145)
N GONORRHOEA DNA SPEC QL PROBE+SIG AMP: NEGATIVE
SOURCE: ABNORMAL
SPECIMEN DESCRIPTION: NORMAL
T PALLIDUM AB SER QL IA: NONREACTIVE
TIBC SERPL-MCNC: 459 UG/DL (ref 250–450)
TRICHOMONAS: NEGATIVE
TSH SERPL DL<=0.05 MIU/L-ACNC: 1.61 UIU/ML (ref 0.27–4.2)
UNSATURATED IRON BINDING CAPACITY: 372 UG/DL (ref 112–347)
VIT B12 SERPL-MCNC: 391 PG/ML (ref 232–1245)

## 2025-04-11 RX ORDER — METRONIDAZOLE 7.5 MG/G
GEL TOPICAL
Qty: 45 G | Refills: 0 | Status: SHIPPED | OUTPATIENT
Start: 2025-04-11

## 2025-04-11 RX ORDER — METRONIDAZOLE 500 MG/1
500 TABLET ORAL 2 TIMES DAILY
Qty: 14 TABLET | Refills: 0 | Status: SHIPPED | OUTPATIENT
Start: 2025-04-11 | End: 2025-04-18

## 2025-04-12 ENCOUNTER — RESULTS FOLLOW-UP (OUTPATIENT)
Dept: INTERNAL MEDICINE CLINIC | Age: 30
End: 2025-04-12

## 2025-04-12 ENCOUNTER — RESULTS FOLLOW-UP (OUTPATIENT)
Dept: OBGYN | Age: 30
End: 2025-04-12

## 2025-04-12 DIAGNOSIS — D50.9 IRON DEFICIENCY ANEMIA, UNSPECIFIED IRON DEFICIENCY ANEMIA TYPE: Primary | ICD-10-CM

## 2025-04-12 RX ORDER — FERROUS SULFATE 325(65) MG
325 TABLET ORAL
Qty: 30 TABLET | Refills: 0 | Status: SHIPPED | OUTPATIENT
Start: 2025-04-12

## 2025-04-12 NOTE — TELEPHONE ENCOUNTER
Patient's hemoglobin is low.  It is likely secondary to her heavy menstruations.  Discussed lab results with patient and I will start her on ferrous sulfate.  Patient is agreeable.

## 2025-04-18 DIAGNOSIS — N89.8 VAGINAL DISCHARGE: Primary | ICD-10-CM

## 2025-04-18 RX ORDER — FLUCONAZOLE 150 MG/1
150 TABLET ORAL ONCE
Qty: 1 TABLET | Refills: 0 | Status: SHIPPED | OUTPATIENT
Start: 2025-04-18 | End: 2025-04-18

## 2025-04-25 ENCOUNTER — RESULTS FOLLOW-UP (OUTPATIENT)
Dept: OBGYN | Age: 30
End: 2025-04-25

## 2025-05-22 ENCOUNTER — OFFICE VISIT (OUTPATIENT)
Age: 30
End: 2025-05-22
Payer: MEDICAID

## 2025-05-22 VITALS
HEIGHT: 64 IN | DIASTOLIC BLOOD PRESSURE: 82 MMHG | WEIGHT: 199 LBS | HEART RATE: 87 BPM | BODY MASS INDEX: 33.97 KG/M2 | SYSTOLIC BLOOD PRESSURE: 141 MMHG

## 2025-05-22 DIAGNOSIS — N94.6 DYSMENORRHEA: Primary | ICD-10-CM

## 2025-05-22 DIAGNOSIS — N92.0 MENORRHAGIA WITH REGULAR CYCLE: ICD-10-CM

## 2025-05-22 PROCEDURE — 99212 OFFICE O/P EST SF 10 MIN: CPT

## 2025-05-22 PROCEDURE — 99213 OFFICE O/P EST LOW 20 MIN: CPT

## 2025-05-23 NOTE — PROGRESS NOTES
Attending Physician Statement  I have discussed the care of Dalila White, including pertinent history and exam findings,  with the resident. I have reviewed the key elements of all parts of the encounter with the resident.  I agree with the assessment, plan and orders as documented by the resident.  (GE Modifier)    Laura Cleary,    
incontinence, negative vaginal discharge, negative vaginal bleeding or spotting  Dermatological: negative rash, negative pruritis, negative mole or other skin changes  Hematologic: negative bruising  Immunologic/Lymphatic: negative recent illness, negative recent sick contact  Musculoskeletal: negative back pain, negative myalgias, negative arthralgias  Neurological:  negative dizziness, negative migraines, negative seizures, negative weakness  Behavior/Psych: negative depression, negative anxiety, negative SI, negative HI      OBSTETRICAL HISTORY:  OB History    Para Term  AB Living   1 1 1 0 0 1   SAB IAB Ectopic Molar Multiple Live Births   0 0 0  0 1      # Outcome Date GA Lbr Jonh/2nd Weight Sex Type Anes PTL Lv   1 Term 20 37w2d  2.665 kg (5 lb 14 oz) F CS-LTranv EPI, Spinal N MARGOT      Complications: Fetal Intolerance       PAST MEDICAL HISTORY:      Diagnosis Date    Depression 2021    Gestational hypertension (G1) 2020    History of blood transfusion 2018    From Motrin use     Hx of blood transfusion (2018) 2020    Due to GI bleed secondary to Motrin use    Menometrorrhagia 05/10/2018    Symptomatic anemia 2018    From Motrin use     Vision abnormalities        PAST SURGICAL HISTORY:                                                                    Procedure Laterality Date     SECTION N/A 2020     SECTION performed by Davion Garcia DO at [facility] OR    COLONOSCOPY  2018    No lesions seen    LA COLON CA SCRN NOT HI RSK IND N/A 2018    COLONOSCOPY performed by Franklin Mcclure MD at [facility] OR    UPPER GASTROINTESTINAL ENDOSCOPY  2018    No lesions seen up to the 2nd part of the duodenum.    UPPER GASTROINTESTINAL ENDOSCOPY N/A 2018    EGD DIAGNOSTIC ONLY performed by Franklin Mcclure MD at [facility] OR       MEDICATIONS:  Current Outpatient Medications   Medication Sig Dispense Refill    ferrous sulfate (IRON 325)

## (undated) DEVICE — TOWEL SURG W16XL26IN WHT NONFENESTRATED ST 2 PER PK

## (undated) DEVICE — SUTURE CHROMIC GUT 0 L36IN CT-1 L36MM 1/2 CIR TAPERPOINT 924H

## (undated) DEVICE — 60 ML SYRINGE LUER-LOCK TIP: Brand: MONOJECT

## (undated) DEVICE — BLOCK BITE 60FR RUBBER ADLT DENTAL

## (undated) DEVICE — SOLUTION SOD CHL 0.9% 1000ML

## (undated) DEVICE — KENDALL SCD EXPRESS SLEEVES, KNEE LENGTH, MEDIUM: Brand: KENDALL SCD

## (undated) DEVICE — TUBING, SUCTION, 1/4" X 12', STRAIGHT: Brand: MEDLINE

## (undated) DEVICE — Z DUP USE 2522782 SOLUTION IRRIG 1000ML STRL H2O PLAS CONTAINER UROMATIC

## (undated) DEVICE — ADAPTER TBNG LUER STUB 15 GA INTMED

## (undated) DEVICE — GAUZE,SPONGE,4"X4",16PLY,STRL,LF,10/TRAY: Brand: MEDLINE

## (undated) DEVICE — BASIN EMSIS 700ML GRAPHITE PLAS KID SHP GRAD

## (undated) DEVICE — CUP MED 1OZ CLR POLYPR FEED GRAD W/O LID

## (undated) DEVICE — SUTURE VCRL SZ 2-0 L36IN ABSRB VLT L36MM CT-1 1/2 CIR J345H